# Patient Record
Sex: FEMALE | Race: WHITE | NOT HISPANIC OR LATINO | Employment: FULL TIME | URBAN - METROPOLITAN AREA
[De-identification: names, ages, dates, MRNs, and addresses within clinical notes are randomized per-mention and may not be internally consistent; named-entity substitution may affect disease eponyms.]

---

## 2017-01-11 ENCOUNTER — TRANSCRIBE ORDERS (OUTPATIENT)
Dept: MAMMOGRAPHY | Facility: HOSPITAL | Age: 57
End: 2017-01-11

## 2017-01-11 ENCOUNTER — HOSPITAL ENCOUNTER (OUTPATIENT)
Dept: MAMMOGRAPHY | Facility: HOSPITAL | Age: 57
Discharge: HOME | End: 2017-01-11
Admitting: OBSTETRICS & GYNECOLOGY

## 2017-01-11 DIAGNOSIS — R92.8 ABNORMAL MAMMOGRAM: ICD-10-CM

## 2017-01-11 DIAGNOSIS — R92.8 ABNORMAL MAMMOGRAM: Primary | ICD-10-CM

## 2017-01-11 PROCEDURE — 77065 DX MAMMO INCL CAD UNI: CPT | Performed by: RADIOLOGY

## 2017-01-11 PROCEDURE — G0206 DX MAMMO INCL CAD UNI: HCPCS

## 2017-07-12 ENCOUNTER — APPOINTMENT (OUTPATIENT)
Dept: MAMMOGRAPHY | Facility: HOSPITAL | Age: 57
End: 2017-07-12
Attending: OBSTETRICS & GYNECOLOGY

## 2017-07-12 ENCOUNTER — HOSPITAL ENCOUNTER (OUTPATIENT)
Dept: MAMMOGRAPHY | Facility: HOSPITAL | Age: 57
Discharge: HOME | End: 2017-07-12
Attending: OBSTETRICS & GYNECOLOGY | Admitting: OBSTETRICS & GYNECOLOGY

## 2017-07-12 ENCOUNTER — TRANSCRIBE ORDERS (OUTPATIENT)
Dept: MAMMOGRAPHY | Facility: HOSPITAL | Age: 57
End: 2017-07-12

## 2017-07-12 DIAGNOSIS — R92.8 ABNORMAL MAMMOGRAM: Primary | ICD-10-CM

## 2017-07-12 DIAGNOSIS — R92.8 ABNORMAL MAMMOGRAM: ICD-10-CM

## 2017-07-12 PROCEDURE — 77065 DX MAMMO INCL CAD UNI: CPT | Performed by: RADIOLOGY

## 2017-07-12 PROCEDURE — 77061 BREAST TOMOSYNTHESIS UNI: CPT | Performed by: RADIOLOGY

## 2017-07-12 PROCEDURE — G0279 TOMOSYNTHESIS, MAMMO: HCPCS

## 2017-07-12 PROCEDURE — G0206 DX MAMMO INCL CAD UNI: HCPCS

## 2017-12-19 ENCOUNTER — OFFICE VISIT (OUTPATIENT)
Dept: INTERNAL MEDICINE | Facility: CLINIC | Age: 57
End: 2017-12-19

## 2017-12-19 ENCOUNTER — TELEPHONE (OUTPATIENT)
Dept: INTERNAL MEDICINE | Facility: CLINIC | Age: 57
End: 2017-12-19

## 2017-12-19 VITALS
HEART RATE: 74 BPM | WEIGHT: 143 LBS | HEIGHT: 66 IN | TEMPERATURE: 98.1 F | DIASTOLIC BLOOD PRESSURE: 72 MMHG | OXYGEN SATURATION: 99 % | SYSTOLIC BLOOD PRESSURE: 110 MMHG | BODY MASS INDEX: 22.98 KG/M2

## 2017-12-19 DIAGNOSIS — J01.91 ACUTE RECURRENT SINUSITIS, UNSPECIFIED LOCATION: Primary | ICD-10-CM

## 2017-12-19 PROCEDURE — 99213 OFFICE O/P EST LOW 20 MIN: CPT | Performed by: NURSE PRACTITIONER

## 2017-12-19 RX ORDER — CEFDINIR 300 MG/1
300 CAPSULE ORAL DAILY
Qty: 20 CAPSULE | Refills: 0 | Status: SHIPPED | OUTPATIENT
Start: 2017-12-19 | End: 2018-03-07

## 2017-12-19 NOTE — PROGRESS NOTES
Subjective  URI (pt states that this has been going on for about 5 or 6 days. )      Samanta Mera is a 57 y.o. female.   Allergies   Allergen Reactions   • No Known Drug Allergy      History of Present Illness      2-3 weeks ago went to [facility] was given Amoxil, took that was better for 1 week then 6 days ago sinus pain and pressure , snot from nose, cough with intermittent production, chills , has tried sudafed , mucinex all w/o relief   The following portions of the patient's history were reviewed and updated as appropriate: allergies, past surgical history and problem list.    Review of Systems   Constitutional: Positive for chills and fatigue. Negative for activity change and unexpected weight change.   HENT: Positive for congestion, postnasal drip, sinus pain, sinus pressure and sore throat. Negative for ear pain.    Eyes: Negative for pain and discharge.   Respiratory: Positive for cough. Negative for chest tightness, shortness of breath and wheezing.    Cardiovascular: Negative for chest pain and palpitations.   Gastrointestinal: Negative for abdominal pain, diarrhea and vomiting.   Endocrine: Negative.    Genitourinary: Negative.    Musculoskeletal: Negative for joint swelling.   Skin: Negative for color change, rash and wound.   Allergic/Immunologic: Negative.    Neurological: Negative for seizures and syncope.   Psychiatric/Behavioral: Negative.    All other systems reviewed and are negative.      Objective   Physical Exam   Constitutional: She is oriented to person, place, and time. She appears well-developed and well-nourished.  Non-toxic appearance. No distress.   HENT:   Head: Normocephalic and atraumatic. Hair is normal.   Right Ear: External ear normal. No drainage, swelling or tenderness. Tympanic membrane is retracted.   Left Ear: External ear normal. No drainage, swelling or tenderness. Tympanic membrane is retracted.   Nose: Mucosal edema present. No epistaxis. Right sinus exhibits  "maxillary sinus tenderness and frontal sinus tenderness. Left sinus exhibits maxillary sinus tenderness and frontal sinus tenderness.   Mouth/Throat: Uvula is midline and mucous membranes are normal. No oral lesions. No uvula swelling. Posterior oropharyngeal erythema present. No oropharyngeal exudate.   Eyes: Conjunctivae are normal. Right eye exhibits no discharge. Left eye exhibits no discharge. No scleral icterus.   Neck: Neck supple.   Cardiovascular: Normal rate and regular rhythm.  Exam reveals no gallop.    No murmur heard.  Pulmonary/Chest: Breath sounds normal. No stridor. No respiratory distress. She has no wheezes. She has no rales. She exhibits no tenderness.   Abdominal: Soft. There is no tenderness.   Lymphadenopathy:     She has cervical adenopathy.   Neurological: She is alert and oriented to person, place, and time. She exhibits normal muscle tone.   Skin: Skin is warm and dry. No rash noted. She is not diaphoretic.   Psychiatric: She has a normal mood and affect. Her behavior is normal. Judgment and thought content normal.   Nursing note and vitals reviewed.    /72  Pulse 74  Temp 98.1 °F (36.7 °C) (Oral)   Ht 167.6 cm (65.98\")  Wt 64.9 kg (143 lb)  SpO2 99%  BMI 23.09 kg/m2    Assessment/Plan     Problem List Items Addressed This Visit     None      Visit Diagnoses     Acute recurrent sinusitis, unspecified location    -  Primary    Relevant Medications    cefdinir (OMNICEF) 300 MG capsule      nasal saline otc  Increase fluids. Tylenol/ibuprofen prn comfort, rtc 48h no improvement or worsening of sx.             "

## 2017-12-19 NOTE — TELEPHONE ENCOUNTER
Pharmacy called to see if omnicef directions were correct. Spoke with zahida, it is supposed to be 2 tablets daily. Called pharmacy and corrected directions.

## 2018-01-12 ENCOUNTER — HOSPITAL ENCOUNTER (OUTPATIENT)
Dept: MAMMOGRAPHY | Facility: HOSPITAL | Age: 58
Discharge: HOME | End: 2018-01-12
Attending: OBSTETRICS & GYNECOLOGY | Admitting: OBSTETRICS & GYNECOLOGY

## 2018-01-12 ENCOUNTER — HOSPITAL ENCOUNTER (OUTPATIENT)
Dept: ULTRASOUND IMAGING | Facility: HOSPITAL | Age: 58
Discharge: HOME | End: 2018-01-12

## 2018-01-12 ENCOUNTER — HOSPITAL ENCOUNTER (OUTPATIENT)
Dept: MAMMOGRAPHY | Facility: HOSPITAL | Age: 58
Discharge: HOME | End: 2018-01-12

## 2018-01-12 DIAGNOSIS — R92.8 ABNORMAL MAMMOGRAM: ICD-10-CM

## 2018-01-12 PROCEDURE — 19083 BX BREAST 1ST LESION US IMAG: CPT | Performed by: RADIOLOGY

## 2018-01-12 PROCEDURE — 76642 ULTRASOUND BREAST LIMITED: CPT | Performed by: RADIOLOGY

## 2018-01-12 PROCEDURE — 88305 TISSUE EXAM BY PATHOLOGIST: CPT | Performed by: RADIOLOGY

## 2018-01-12 PROCEDURE — 77066 DX MAMMO INCL CAD BI: CPT

## 2018-01-12 PROCEDURE — 77062 BREAST TOMOSYNTHESIS BI: CPT | Performed by: RADIOLOGY

## 2018-01-12 PROCEDURE — 88342 IMHCHEM/IMCYTCHM 1ST ANTB: CPT | Performed by: RADIOLOGY

## 2018-01-12 PROCEDURE — A4648 IMPLANTABLE TISSUE MARKER: HCPCS

## 2018-01-12 PROCEDURE — 77066 DX MAMMO INCL CAD BI: CPT | Performed by: RADIOLOGY

## 2018-01-12 PROCEDURE — 88360 TUMOR IMMUNOHISTOCHEM/MANUAL: CPT | Performed by: RADIOLOGY

## 2018-01-12 PROCEDURE — G0279 TOMOSYNTHESIS, MAMMO: HCPCS

## 2018-01-12 PROCEDURE — 76642 ULTRASOUND BREAST LIMITED: CPT

## 2018-01-12 RX ORDER — LIDOCAINE HYDROCHLORIDE 10 MG/ML
5 INJECTION, SOLUTION INFILTRATION; PERINEURAL ONCE
Status: COMPLETED | OUTPATIENT
Start: 2018-01-12 | End: 2018-01-12

## 2018-01-12 RX ORDER — LIDOCAINE HYDROCHLORIDE AND EPINEPHRINE 10; 10 MG/ML; UG/ML
10 INJECTION, SOLUTION INFILTRATION; PERINEURAL ONCE
Status: COMPLETED | OUTPATIENT
Start: 2018-01-12 | End: 2018-01-12

## 2018-01-12 RX ADMIN — LIDOCAINE HYDROCHLORIDE 5 ML: 10 INJECTION, SOLUTION INFILTRATION; PERINEURAL at 13:10

## 2018-01-12 RX ADMIN — LIDOCAINE HYDROCHLORIDE,EPINEPHRINE BITARTRATE 10 ML: 10; .01 INJECTION, SOLUTION INFILTRATION; PERINEURAL at 13:12

## 2018-01-17 LAB
CYTO UR: NORMAL
LAB AP CASE REPORT: NORMAL
LAB AP CLINICAL INFORMATION: NORMAL
LAB AP DIAGNOSIS COMMENT: NORMAL
LAB AP SPECIAL STAINS: NORMAL
Lab: NORMAL
PATH REPORT.FINAL DX SPEC: NORMAL
PATH REPORT.GROSS SPEC: NORMAL

## 2018-01-19 ENCOUNTER — TELEPHONE (OUTPATIENT)
Dept: MAMMOGRAPHY | Facility: HOSPITAL | Age: 58
End: 2018-01-19

## 2018-02-07 ENCOUNTER — DOCUMENTATION (OUTPATIENT)
Dept: OTHER | Facility: HOSPITAL | Age: 58
End: 2018-02-07

## 2018-02-07 NOTE — PROGRESS NOTES
I saw patient with Dr BOYER and patients  - Dr BOYER reviewed the pathology report and surgical options - Stage IA, LG IDC, ER/PA positive and HER negative - she prefers to have BCT. An MRI has been ordered - I reviewed the educational and supportive materials with the patient and her .

## 2018-02-20 ENCOUNTER — TELEPHONE (OUTPATIENT)
Dept: MRI IMAGING | Facility: HOSPITAL | Age: 58
End: 2018-02-20

## 2018-02-20 ENCOUNTER — DOCUMENTATION (OUTPATIENT)
Dept: OTHER | Facility: HOSPITAL | Age: 58
End: 2018-02-20

## 2018-02-20 ENCOUNTER — HOSPITAL ENCOUNTER (OUTPATIENT)
Dept: MRI IMAGING | Facility: HOSPITAL | Age: 58
Discharge: HOME | End: 2018-02-20
Attending: SURGERY | Admitting: SURGERY

## 2018-02-20 DIAGNOSIS — C50.411 BILATERAL MALIGNANT NEOPLASM OF UPPER OUTER QUADRANT OF BREAST IN FEMALE, UNSPECIFIED ESTROGEN RECEPTOR STATUS (HCC): ICD-10-CM

## 2018-02-20 DIAGNOSIS — C50.412 BILATERAL MALIGNANT NEOPLASM OF UPPER OUTER QUADRANT OF BREAST IN FEMALE, UNSPECIFIED ESTROGEN RECEPTOR STATUS (HCC): ICD-10-CM

## 2018-02-20 LAB — CREAT BLDA-MCNC: 0.9 MG/DL (ref 0.6–1.3)

## 2018-02-20 PROCEDURE — 77059 MRI BREAST BILATERAL DIAGNOSTIC W WO CONTRAST: CPT | Performed by: RADIOLOGY

## 2018-02-20 PROCEDURE — 82565 ASSAY OF CREATININE: CPT

## 2018-02-20 PROCEDURE — C8908 MRI W/O FOL W/CONT, BREAST,: HCPCS

## 2018-02-20 PROCEDURE — 0159T PR BREAST MRI, COMPUTER AIDED DETECTION: CPT | Performed by: RADIOLOGY

## 2018-02-20 PROCEDURE — A9577 INJ MULTIHANCE: HCPCS | Performed by: SURGERY

## 2018-02-20 PROCEDURE — 0 GADOBENATE DIMEGLUMINE 529 MG/ML SOLUTION: Performed by: SURGERY

## 2018-02-20 RX ADMIN — GADOBENATE DIMEGLUMINE 12 ML: 529 INJECTION, SOLUTION INTRAVENOUS at 10:45

## 2018-02-20 NOTE — TELEPHONE ENCOUNTER
Called pt with Breast MRI results. Recommended surgical consult. Pt expressed verbal understanding and was to touch base with her surgeon.

## 2018-02-20 NOTE — PROGRESS NOTES
Patient called to say that she had received her MRI result and she was ready to schedule lumpectomy - I reviewed wire localization and that it might be Thursday afternoon before  called her - she verbalized understanding - I notified Sridevi in Dr BOYER's office

## 2018-02-22 ENCOUNTER — TRANSCRIBE ORDERS (OUTPATIENT)
Dept: MAMMOGRAPHY | Facility: HOSPITAL | Age: 58
End: 2018-02-22

## 2018-02-22 ENCOUNTER — DOCUMENTATION (OUTPATIENT)
Dept: OTHER | Facility: HOSPITAL | Age: 58
End: 2018-02-22

## 2018-02-22 ENCOUNTER — TRANSCRIBE ORDERS (OUTPATIENT)
Dept: ADMINISTRATIVE | Facility: HOSPITAL | Age: 58
End: 2018-02-22

## 2018-02-22 DIAGNOSIS — C50.411 CARCINOMA OF UPPER-OUTER QUADRANT OF FEMALE BREAST, RIGHT (HCC): Primary | ICD-10-CM

## 2018-02-22 DIAGNOSIS — C50.411 PRIMARY CANCER OF UPPER OUTER QUADRANT OF RIGHT BREAST (HCC): Primary | ICD-10-CM

## 2018-02-22 NOTE — PROGRESS NOTES
Patient called wanting to know if a surgery date was available yet I called Chandrika @ [facility] and she said she was waiting to hear back from [facility] for wire loc time. I notified patient that she should hear from  by tomorrow morning.  Patient verbalized understanding

## 2018-02-23 ENCOUNTER — TRANSCRIBE ORDERS (OUTPATIENT)
Dept: LAB | Facility: HOSPITAL | Age: 58
End: 2018-02-23

## 2018-02-23 ENCOUNTER — LAB (OUTPATIENT)
Dept: LAB | Facility: HOSPITAL | Age: 58
End: 2018-02-23

## 2018-02-23 DIAGNOSIS — Z01.812 PRE-OPERATIVE LABORATORY EXAMINATION: ICD-10-CM

## 2018-02-23 DIAGNOSIS — Z01.812 PRE-OPERATIVE LABORATORY EXAMINATION: Primary | ICD-10-CM

## 2018-02-23 LAB
ALBUMIN SERPL-MCNC: 4.6 G/DL (ref 3.2–4.8)
ALBUMIN/GLOB SERPL: 1.4 G/DL (ref 1.5–2.5)
ALP SERPL-CCNC: 49 U/L (ref 25–100)
ALT SERPL W P-5'-P-CCNC: 26 U/L (ref 7–40)
ANION GAP SERPL CALCULATED.3IONS-SCNC: 9 MMOL/L (ref 3–11)
AST SERPL-CCNC: 35 U/L (ref 0–33)
BILIRUB SERPL-MCNC: 0.4 MG/DL (ref 0.3–1.2)
BUN BLD-MCNC: 23 MG/DL (ref 9–23)
BUN/CREAT SERPL: 25.6 (ref 7–25)
CALCIUM SPEC-SCNC: 9.9 MG/DL (ref 8.7–10.4)
CHLORIDE SERPL-SCNC: 103 MMOL/L (ref 99–109)
CO2 SERPL-SCNC: 26 MMOL/L (ref 20–31)
CREAT BLD-MCNC: 0.9 MG/DL (ref 0.6–1.3)
DEPRECATED RDW RBC AUTO: 44 FL (ref 37–54)
ERYTHROCYTE [DISTWIDTH] IN BLOOD BY AUTOMATED COUNT: 13.3 % (ref 11.3–14.5)
GFR SERPL CREATININE-BSD FRML MDRD: 64 ML/MIN/1.73
GLOBULIN UR ELPH-MCNC: 3.3 GM/DL
GLUCOSE BLD-MCNC: 91 MG/DL (ref 70–100)
HCT VFR BLD AUTO: 41.8 % (ref 34.5–44)
HGB BLD-MCNC: 13.5 G/DL (ref 11.5–15.5)
MCH RBC QN AUTO: 29.2 PG (ref 27–31)
MCHC RBC AUTO-ENTMCNC: 32.3 G/DL (ref 32–36)
MCV RBC AUTO: 90.3 FL (ref 80–99)
PLATELET # BLD AUTO: 237 10*3/MM3 (ref 150–450)
PMV BLD AUTO: 12 FL (ref 6–12)
POTASSIUM BLD-SCNC: 4.3 MMOL/L (ref 3.5–5.5)
PROT SERPL-MCNC: 7.9 G/DL (ref 5.7–8.2)
RBC # BLD AUTO: 4.63 10*6/MM3 (ref 3.89–5.14)
SODIUM BLD-SCNC: 138 MMOL/L (ref 132–146)
WBC NRBC COR # BLD: 7.25 10*3/MM3 (ref 3.5–10.8)

## 2018-02-23 PROCEDURE — 36415 COLL VENOUS BLD VENIPUNCTURE: CPT

## 2018-02-23 PROCEDURE — 85027 COMPLETE CBC AUTOMATED: CPT

## 2018-02-23 PROCEDURE — 80053 COMPREHEN METABOLIC PANEL: CPT

## 2018-02-27 ENCOUNTER — HOSPITAL ENCOUNTER (OUTPATIENT)
Dept: MAMMOGRAPHY | Facility: HOSPITAL | Age: 58
Discharge: HOME | End: 2018-02-27

## 2018-02-27 ENCOUNTER — HOSPITAL ENCOUNTER (OUTPATIENT)
Dept: MAMMOGRAPHY | Facility: HOSPITAL | Age: 58
Discharge: HOME | End: 2018-02-27
Attending: SURGERY | Admitting: SURGERY

## 2018-02-27 ENCOUNTER — HOSPITAL ENCOUNTER (OUTPATIENT)
Dept: NUCLEAR MEDICINE | Facility: HOSPITAL | Age: 58
Discharge: HOME | End: 2018-02-27
Attending: SURGERY

## 2018-02-27 DIAGNOSIS — C50.411 PRIMARY CANCER OF UPPER OUTER QUADRANT OF RIGHT BREAST (HCC): ICD-10-CM

## 2018-02-27 DIAGNOSIS — C50.411 CARCINOMA OF UPPER-OUTER QUADRANT OF FEMALE BREAST, RIGHT (HCC): ICD-10-CM

## 2018-02-27 PROCEDURE — 77065 DX MAMMO INCL CAD UNI: CPT | Performed by: RADIOLOGY

## 2018-02-27 PROCEDURE — 38792 RA TRACER ID OF SENTINL NODE: CPT

## 2018-02-27 PROCEDURE — 76098 X-RAY EXAM SURGICAL SPECIMEN: CPT | Performed by: RADIOLOGY

## 2018-02-27 PROCEDURE — 19283 PERQ DEV BREAST 1ST STRTCTC: CPT | Performed by: RADIOLOGY

## 2018-02-27 PROCEDURE — 0 TECHNETIUM FILTERED SULFUR COLLOID: Performed by: SURGERY

## 2018-02-27 PROCEDURE — 88331 PATH CONSLTJ SURG 1 BLK 1SPC: CPT | Performed by: SURGERY

## 2018-02-27 PROCEDURE — 76098 X-RAY EXAM SURGICAL SPECIMEN: CPT

## 2018-02-27 PROCEDURE — 88307 TISSUE EXAM BY PATHOLOGIST: CPT | Performed by: SURGERY

## 2018-02-27 PROCEDURE — A9541 TC99M SULFUR COLLOID: HCPCS | Performed by: SURGERY

## 2018-02-27 RX ORDER — LIDOCAINE HYDROCHLORIDE 10 MG/ML
5 INJECTION, SOLUTION INFILTRATION; PERINEURAL ONCE
Status: COMPLETED | OUTPATIENT
Start: 2018-02-27 | End: 2018-02-27

## 2018-02-27 RX ADMIN — TECHNETIUM TC 99M SULFUR COLLOID 1 DOSE: KIT at 13:15

## 2018-02-27 RX ADMIN — LIDOCAINE HYDROCHLORIDE 1 ML: 10 INJECTION, SOLUTION INFILTRATION; PERINEURAL at 09:14

## 2018-02-27 RX ADMIN — METHYLENE BLUE 10 MG: 10 INJECTION INTRAVENOUS at 09:16

## 2018-02-28 ENCOUNTER — LAB REQUISITION (OUTPATIENT)
Dept: LAB | Facility: HOSPITAL | Age: 58
End: 2018-02-28

## 2018-02-28 DIAGNOSIS — C50.411 MALIGNANT NEOPLASM OF UPPER-OUTER QUADRANT OF RIGHT FEMALE BREAST (HCC): ICD-10-CM

## 2018-03-01 LAB
CYTO UR: NORMAL
LAB AP CASE REPORT: NORMAL
LAB AP CLINICAL INFORMATION: NORMAL
Lab: NORMAL
Lab: NORMAL
PATH REPORT.FINAL DX SPEC: NORMAL
PATH REPORT.GROSS SPEC: NORMAL

## 2018-03-07 ENCOUNTER — HOSPITAL ENCOUNTER (OUTPATIENT)
Dept: RADIATION ONCOLOGY | Facility: HOSPITAL | Age: 58
Setting detail: RADIATION/ONCOLOGY SERIES
Discharge: HOME | End: 2018-03-07

## 2018-03-07 ENCOUNTER — OFFICE VISIT (OUTPATIENT)
Dept: RADIATION ONCOLOGY | Facility: HOSPITAL | Age: 58
End: 2018-03-07

## 2018-03-07 ENCOUNTER — EDUCATION (OUTPATIENT)
Dept: ONCOLOGY | Facility: HOSPITAL | Age: 58
End: 2018-03-07

## 2018-03-07 ENCOUNTER — CONSULT (OUTPATIENT)
Dept: ONCOLOGY | Facility: CLINIC | Age: 58
End: 2018-03-07

## 2018-03-07 VITALS
RESPIRATION RATE: 14 BRPM | TEMPERATURE: 97.4 F | BODY MASS INDEX: 22.5 KG/M2 | SYSTOLIC BLOOD PRESSURE: 114 MMHG | DIASTOLIC BLOOD PRESSURE: 77 MMHG | HEIGHT: 66 IN | HEART RATE: 70 BPM | WEIGHT: 140 LBS

## 2018-03-07 DIAGNOSIS — C50.411 MALIGNANT NEOPLASM OF UPPER-OUTER QUADRANT OF RIGHT BREAST IN FEMALE, ESTROGEN RECEPTOR POSITIVE (HCC): Primary | ICD-10-CM

## 2018-03-07 DIAGNOSIS — C50.911 MALIGNANT NEOPLASM OF RIGHT BREAST IN FEMALE, ESTROGEN RECEPTOR POSITIVE, UNSPECIFIED SITE OF BREAST (HCC): Primary | ICD-10-CM

## 2018-03-07 DIAGNOSIS — Z17.0 MALIGNANT NEOPLASM OF UPPER-OUTER QUADRANT OF RIGHT BREAST IN FEMALE, ESTROGEN RECEPTOR POSITIVE (HCC): Primary | ICD-10-CM

## 2018-03-07 DIAGNOSIS — Z17.0 MALIGNANT NEOPLASM OF RIGHT BREAST IN FEMALE, ESTROGEN RECEPTOR POSITIVE, UNSPECIFIED SITE OF BREAST (HCC): Primary | ICD-10-CM

## 2018-03-07 PROCEDURE — 99204 OFFICE O/P NEW MOD 45 MIN: CPT | Performed by: INTERNAL MEDICINE

## 2018-03-07 NOTE — PLAN OF CARE
Oral Chemotherapy Teaching      Patient Name/:  Samanta Mera   1960  Oral Chemotherapy Regimen:  Anastrazole daily  Date Started Medication: TBD    Initial Teaching Follow Up Comments     Safety     Storage instructions (away from children; away from heat/cold, sunlight, or moisture), handling - use of gloves (caregivers), washing hands after touching pills, managing waste     “How are you storing your medications?”, reminders on storage, proper handling (caregivers using gloves, washing hands, away from children, managing waste, etc.), disposal of medication with D/C or dosage change         Adherence      patient and/or caregiver on how to take medication, take with/without food, assess their adherence potential, stress importance of adherence, ways to manage adherence (pill boxes, phone reminders, calendars), what to do if miss a dose   “How are you taking your medication?” “How are you remembering to take your medication?”, “How many doses have you missed?”, determine reasons for non-adherence (not remembering, side effects, etc), ways to improve, overadherence? Remind patient of ways to improve/maintain adherence   Discussed taking medication once every day     Side Effects/Adverse Reactions      patient on potential side effects, s/s, ways to manage, when to call MD/seek help     Determine if patient experiencing side effects, ways to manage  Discussed potential AEs such as hot flashes, mood changes, muscle/joint pain, osteoporosis risk, and DVT/PE risk.  Discussed management of AEs and when to contact MD/seek medical attention     Miscellaneous     Food interactions, DDIs, financial issues Determine if patient started any new medications since being placed on oral chemo (analyze for DDI) Answered patient questions     Additional Notes:  Encouraged patient to call with future questions

## 2018-03-07 NOTE — PROGRESS NOTES
Subjective     PROBLEM LIST:  1. [id number] (stage IA) ER+ PA+ her2 negative invasive ductal carcinoma of the right breast  A) presented with an abnormality on screening mammogram.  She underwent lumpectomy on 18.  Pathology showed a 5 mm grade 1 IDC.  0/4 SLN involved.      CHIEF COMPLAINT: newly diagnosed breast cancer      HISTORY OF PRESENT ILLNESS:  The patient is a 58 y.o. year old female, referred for evaluation of recently diagnosed breast cancer.  She presented with an abnormality on screening mammogram.  She underwent biopsy and subsequently had an MRI.  She had a lumpectomy last week.  She had been taking estrogen replacement patches which she stopped at the time of her diagnosis.  She is having a lot of hot flashes which are interfering with sleep.  She also reports that her appetite is not great.  She did try taking Effexor for several days which did seem to help with the hot flashes but she had some nausea and other side effects that she stopped this medication.    REVIEW OF SYSTEMS:  A 14 point review of systems was performed and is negative except as noted above.    Past Medical History:   Diagnosis Date   • Malignant neoplasm of right breast in female, estrogen receptor positive 3/7/2018       GYN History: .  First birth age 23.  Menarche age 12.  She has been on hormone replacement therapy since menopause in her early 50s.    Current Outpatient Prescriptions on File Prior to Visit   Medication Sig Dispense Refill   • [DISCONTINUED] cefdinir (OMNICEF) 300 MG capsule Take 1 capsule by mouth Daily. 20 capsule 0   • [DISCONTINUED] estradiol (MINIVELLE, VIVELLE-DOT) 0.1 MG/24HR patch Place 1 patch on the skin 2 (Two) Times a Week.       No current facility-administered medications on file prior to visit.        Allergies   Allergen Reactions   • No Known Drug Allergy        Past Surgical History:   Procedure Laterality Date   •  SECTION      X 3   • HYSTERECTOMY      age 55   •  "OOPHORECTOMY         Social History     Social History   • Marital status:      Social History Main Topics   • Smoking status: Never Smoker   • Smokeless tobacco: Never Used   • Alcohol use 0.6 - 1.2 oz/week     1 - 2 Standard drinks or equivalent per week      Comment: SOCIALLY   • Drug use: No       Family History   Problem Relation Age of Onset   • Cancer Father    • Breast cancer Neg Hx    • Ovarian cancer Neg Hx        Objective     /77  Pulse 70  Temp 97.4 °F (36.3 °C)  Resp 14  Ht 167.6 cm (66\")  Wt 63.5 kg (140 lb)  BMI 22.6 kg/m2  Performance Status: 0  General: well appearing female in no acute distress  Neuro: alert and oriented  HEENT: sclera anicteric, oropharynx clear  Lymphatics: no cervical, supraclavicular, or axillary adenopathy  Cardiovascular: regular rate and rhythm, no murmurs  Lungs: clear to auscultation bilaterally  Abdomen: soft, nontender, nondistended.  No palpable organomegaly  Extremeties: no lower extremity edema  Skin: no rashes, lesions, bruising, or petechiae  Psych: mood and affect appropriate            Assessment/Plan     Samanta Mera is a 58 y.o. year old female with newly diagnosed stage IA ER positive HER-2 negative breast cancer who presents for discussion of adjuvant treatment.  She has met with Dr. Call and will return next week for radiation treatment planning.  I would not recommend chemotherapy for such a small low risk tumor.  We did discuss adjuvant hormonal therapy with an aromatase inhibitor.  We reviewed the potential side effects of anastrozole including hot flashes, vaginal dryness, joint pain, decrease in bone density, and mood or sleep disturbance.  She is are experiencing significant hot flashes related to stopping hormone replacement therapy.  We discussed that these symptoms often improve with time.  I encouraged her to try taking Effexor again and try to stay on it for a month.  Frequently some of the other side effects will decrease with " more time on the medication.  She has had a bone density scan within the past few years that showed normal bone density.  I will tentatively plan to repeat one around March 2019.  I did encourage her to resume taking vitamin D and calcium supplementation.  We also discussed the benefits of regular exercise in helping to manage some of the side effects of treatment.    We will send a prescription for anastrozole today.  She can begin taking this after her radiation is complete.  I will see her back in early May to see how she is tolerating it.             Amy Santillan MD    3/7/2018

## 2018-03-08 ENCOUNTER — DOCUMENTATION (OUTPATIENT)
Dept: OTHER | Facility: HOSPITAL | Age: 58
End: 2018-03-08

## 2018-03-08 NOTE — PROGRESS NOTES
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  Biopsy was positive for infiltrating and in situ low grade adenocarcinoma.  The tumor was ER/AZ positive HER-2/felix negative.  She underwent right breast lumpectomy on 2018 and was found to have infiltrating and in situ well differentiated ductal adenocarcinoma, tumor size 5 x 4 x 4 mm.  Barraza Batista score 3/9.  There was no lymphovascular or perineural invasion.  04 lymph nodes were positive.  Tumor margins were negative by 2 mm.  I've been asked to see her regarding postoperative radiotherapy.  She has recovered well from surgery.  Her main complaint is hot flashes.  She recently stopped taking estrogen.    Allergies   Allergen Reactions   • No Known Drug Allergy        Social History   Substance Use Topics   • Smoking status: Never Smoker   • Smokeless tobacco: Never Used   • Alcohol use 0.6 - 1.2 oz/week     1 - 2 Standard drinks or equivalent per week      Comment: SOCIALLY       Past Medical History:   Diagnosis Date   • Malignant neoplasm of right breast in female, estrogen receptor positive 3/7/2018       family history includes Cancer in her father. There is no history of Breast cancer or Ovarian cancer.     Past Surgical History:   Procedure Laterality Date   •  " SECTION      X 3   • HYSTERECTOMY      age 55   • OOPHORECTOMY          Review of Systems - Oncology        Objective    VITAL SIGNS: Height 66\"  Weight 138 pounds  161/70 pulse 70    KPS       90%    Physical Exam   Constitutional: She appears well-developed and well-nourished.   Neck: Neck supple.   Cardiovascular: Normal rate, regular rhythm and normal heart sounds.    Pulmonary/Chest: Effort normal and breath sounds normal. Right breast exhibits skin change. Right breast exhibits no inverted nipple, no mass, no nipple discharge and no tenderness. Left breast exhibits no inverted nipple, no mass, no nipple discharge, no skin change and no tenderness.       Abdominal: Soft.   Lymphadenopathy:     She has no axillary adenopathy.   Nursing note and vitals reviewed.           The following portions of the patient's history were reviewed and updated as appropriate: allergies, current medications, past family history, past medical history, past social history, past surgical history and problem list.    Assessment      IMPRESSION: Mrs. Mera has early stage breast cancer.      RECOMMENDATIONS:  I recommend postoperative radiotherapy to complete her breast conserving treatment.  The pros and cons, risks and benefits of therapy were discussed with Mrs. Mera and her  and informed consent obtained.  She will see Dr. Santillan today regarding chemotherapy.  If she does not undergo chemotherapy we will proceed with radiation of 40.05 Mera in 15 fractions and no tumor bed boost.  Thank you very much for asking me to see Mrs. Mera.         Saumya Call MD      Errors in dictation may reflect use of voice recognition software and not all errors in transcription may have been detected prior to signing.  "

## 2018-03-08 NOTE — PROGRESS NOTES
I saw patient in follow up s/p lumpectomy and SLN biopsy with Dr BOYER - the patient states that she is not sleeping and having really severe hot flashes after having to stop her HRT due to having ER/SC positive breast cancer. She states that the Efexor helps but she does not like the other side effects. I suggested that she discuss this with Dr. Santillan Medical Oncologist and see Nicolette DEJESUS. She agreed - I have made referral to Nicolette and her office will call patient to schedule.

## 2018-03-15 ENCOUNTER — HOSPITAL ENCOUNTER (OUTPATIENT)
Dept: RADIATION ONCOLOGY | Facility: HOSPITAL | Age: 58
Discharge: HOME | End: 2018-03-15

## 2018-03-15 PROCEDURE — 77290 THER RAD SIMULAJ FIELD CPLX: CPT | Performed by: RADIOLOGY

## 2018-03-15 PROCEDURE — 77332 RADIATION TREATMENT AID(S): CPT | Performed by: RADIOLOGY

## 2018-03-15 PROCEDURE — 77280 THER RAD SIMULAJ FIELD SMPL: CPT | Performed by: RADIOLOGY

## 2018-03-19 PROCEDURE — 77334 RADIATION TREATMENT AID(S): CPT | Performed by: RADIOLOGY

## 2018-03-19 PROCEDURE — 77300 RADIATION THERAPY DOSE PLAN: CPT | Performed by: RADIOLOGY

## 2018-03-19 PROCEDURE — 77295 3-D RADIOTHERAPY PLAN: CPT | Performed by: RADIOLOGY

## 2018-03-23 ENCOUNTER — HOSPITAL ENCOUNTER (OUTPATIENT)
Dept: RADIATION ONCOLOGY | Facility: HOSPITAL | Age: 58
Discharge: HOME | End: 2018-03-23

## 2018-03-23 PROCEDURE — 77280 THER RAD SIMULAJ FIELD SMPL: CPT | Performed by: RADIOLOGY

## 2018-03-28 ENCOUNTER — HOSPITAL ENCOUNTER (OUTPATIENT)
Dept: RADIATION ONCOLOGY | Facility: HOSPITAL | Age: 58
Discharge: HOME | End: 2018-03-28

## 2018-03-28 VITALS — WEIGHT: 137.7 LBS | BODY MASS INDEX: 22.23 KG/M2

## 2018-03-28 PROCEDURE — 77412 RADIATION TX DELIVERY LVL 3: CPT | Performed by: RADIOLOGY

## 2018-03-28 PROCEDURE — 77336 RADIATION PHYSICS CONSULT: CPT | Performed by: RADIOLOGY

## 2018-03-29 ENCOUNTER — HOSPITAL ENCOUNTER (OUTPATIENT)
Dept: RADIATION ONCOLOGY | Facility: HOSPITAL | Age: 58
Discharge: HOME | End: 2018-03-29

## 2018-03-29 PROCEDURE — 77412 RADIATION TX DELIVERY LVL 3: CPT | Performed by: RADIOLOGY

## 2018-03-30 ENCOUNTER — HOSPITAL ENCOUNTER (OUTPATIENT)
Dept: RADIATION ONCOLOGY | Facility: HOSPITAL | Age: 58
Discharge: HOME | End: 2018-03-30

## 2018-03-30 PROCEDURE — 77412 RADIATION TX DELIVERY LVL 3: CPT | Performed by: RADIOLOGY

## 2018-04-02 ENCOUNTER — HOSPITAL ENCOUNTER (OUTPATIENT)
Dept: RADIATION ONCOLOGY | Facility: HOSPITAL | Age: 58
Discharge: HOME | End: 2018-04-02

## 2018-04-02 ENCOUNTER — HOSPITAL ENCOUNTER (OUTPATIENT)
Dept: RADIATION ONCOLOGY | Facility: HOSPITAL | Age: 58
Setting detail: RADIATION/ONCOLOGY SERIES
Discharge: HOME | End: 2018-04-02

## 2018-04-02 PROCEDURE — 77412 RADIATION TX DELIVERY LVL 3: CPT | Performed by: RADIOLOGY

## 2018-04-03 ENCOUNTER — HOSPITAL ENCOUNTER (OUTPATIENT)
Dept: RADIATION ONCOLOGY | Facility: HOSPITAL | Age: 58
Discharge: HOME | End: 2018-04-03

## 2018-04-03 VITALS — WEIGHT: 139 LBS | BODY MASS INDEX: 22.44 KG/M2

## 2018-04-03 PROCEDURE — 77412 RADIATION TX DELIVERY LVL 3: CPT | Performed by: RADIOLOGY

## 2018-04-03 PROCEDURE — 77417 THER RADIOLOGY PORT IMAGE(S): CPT | Performed by: RADIOLOGY

## 2018-04-04 ENCOUNTER — HOSPITAL ENCOUNTER (OUTPATIENT)
Dept: RADIATION ONCOLOGY | Facility: HOSPITAL | Age: 58
Discharge: HOME | End: 2018-04-04

## 2018-04-04 PROCEDURE — 77412 RADIATION TX DELIVERY LVL 3: CPT | Performed by: RADIOLOGY

## 2018-04-04 PROCEDURE — 77336 RADIATION PHYSICS CONSULT: CPT | Performed by: RADIOLOGY

## 2018-04-05 ENCOUNTER — HOSPITAL ENCOUNTER (OUTPATIENT)
Dept: RADIATION ONCOLOGY | Facility: HOSPITAL | Age: 58
Discharge: HOME | End: 2018-04-05

## 2018-04-05 PROCEDURE — 77412 RADIATION TX DELIVERY LVL 3: CPT | Performed by: RADIOLOGY

## 2018-04-06 ENCOUNTER — HOSPITAL ENCOUNTER (OUTPATIENT)
Dept: RADIATION ONCOLOGY | Facility: HOSPITAL | Age: 58
Discharge: HOME | End: 2018-04-06

## 2018-04-06 PROCEDURE — 77412 RADIATION TX DELIVERY LVL 3: CPT | Performed by: RADIOLOGY

## 2018-04-10 ENCOUNTER — HOSPITAL ENCOUNTER (OUTPATIENT)
Dept: RADIATION ONCOLOGY | Facility: HOSPITAL | Age: 58
Discharge: HOME | End: 2018-04-10

## 2018-04-10 VITALS — BODY MASS INDEX: 22.27 KG/M2 | WEIGHT: 138 LBS

## 2018-04-10 PROCEDURE — 77412 RADIATION TX DELIVERY LVL 3: CPT | Performed by: RADIOLOGY

## 2018-04-11 ENCOUNTER — HOSPITAL ENCOUNTER (OUTPATIENT)
Dept: RADIATION ONCOLOGY | Facility: HOSPITAL | Age: 58
Discharge: HOME | End: 2018-04-11

## 2018-04-11 PROCEDURE — 77412 RADIATION TX DELIVERY LVL 3: CPT | Performed by: RADIOLOGY

## 2018-04-11 PROCEDURE — 77417 THER RADIOLOGY PORT IMAGE(S): CPT | Performed by: RADIOLOGY

## 2018-04-12 ENCOUNTER — HOSPITAL ENCOUNTER (OUTPATIENT)
Dept: RADIATION ONCOLOGY | Facility: HOSPITAL | Age: 58
Discharge: HOME | End: 2018-04-12

## 2018-04-12 PROCEDURE — 77412 RADIATION TX DELIVERY LVL 3: CPT | Performed by: RADIOLOGY

## 2018-04-12 PROCEDURE — 77336 RADIATION PHYSICS CONSULT: CPT | Performed by: RADIOLOGY

## 2018-04-13 ENCOUNTER — HOSPITAL ENCOUNTER (OUTPATIENT)
Dept: RADIATION ONCOLOGY | Facility: HOSPITAL | Age: 58
Discharge: HOME | End: 2018-04-13

## 2018-04-13 PROCEDURE — 77412 RADIATION TX DELIVERY LVL 3: CPT | Performed by: RADIOLOGY

## 2018-04-16 ENCOUNTER — HOSPITAL ENCOUNTER (OUTPATIENT)
Dept: RADIATION ONCOLOGY | Facility: HOSPITAL | Age: 58
Discharge: HOME | End: 2018-04-16

## 2018-04-16 PROCEDURE — 77412 RADIATION TX DELIVERY LVL 3: CPT | Performed by: RADIOLOGY

## 2018-04-17 ENCOUNTER — HOSPITAL ENCOUNTER (OUTPATIENT)
Dept: RADIATION ONCOLOGY | Facility: HOSPITAL | Age: 58
Discharge: HOME | End: 2018-04-17

## 2018-04-17 VITALS — WEIGHT: 139.3 LBS | BODY MASS INDEX: 22.48 KG/M2

## 2018-04-17 PROCEDURE — 77412 RADIATION TX DELIVERY LVL 3: CPT | Performed by: RADIOLOGY

## 2018-04-18 ENCOUNTER — HOSPITAL ENCOUNTER (OUTPATIENT)
Dept: RADIATION ONCOLOGY | Facility: HOSPITAL | Age: 58
Discharge: HOME | End: 2018-04-18

## 2018-04-18 PROCEDURE — 77412 RADIATION TX DELIVERY LVL 3: CPT | Performed by: RADIOLOGY

## 2018-04-18 RX ORDER — ANASTROZOLE 1 MG/1
1 TABLET ORAL DAILY
Qty: 30 TABLET | Refills: 5 | Status: SHIPPED | OUTPATIENT
Start: 2018-04-18 | End: 2018-11-12 | Stop reason: SDUPTHER

## 2018-04-23 NOTE — THERAPY DISCHARGE NOTE
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  Biopsy was positive for infiltrating and in situ low grade adenocarcinoma.  The tumor was ER/HI positive HER-2/felix negative.  She underwent right breast lumpectomy on 2018 and was found to have infiltrating and in situ well differentiated ductal adenocarcinoma, tumor size 5 x 4 x 4 mm.  Barraza Batista score 3/9.  There was no lymphovascular or perineural invasion.  04 lymph nodes were positive.  Tumor margins were negative by 2 mm.   She recover well from surgery.  She stopped taking estrogen and was bothered by hot flashes.    She received radiotherapy in our department as follows:    TREATMENT COURSE:   -2018 The right breast received 40.05 Gy in 15 fractions with 6 and 10 MV photons    TOLERANCE:   Mrs. Mera tolerated treatments well.  She had a flareup actinic keratosis and had miliaria rubra in some exposed skin.  We recommended hydrocortisone cream.  She continued to work throughout treatment.      DISPOSITION:  At the completion of therapy an appointment was made for her to return on 2018 at 2:15 PM.  She knows to call if she has any problems sooner.        Saumya Call MD    Errors in dictation may reflect use of voice recognition software and not all errors in transcription may have been detected prior to signing.

## 2018-05-04 NOTE — PROGRESS NOTES
"      PROBLEM LIST:  1. [id number] (stage IA) ER+ AZ+ her2 negative invasive ductal carcinoma of the right breast  A) presented with an abnormality on screening mammogram.  She underwent lumpectomy on 2/27/18.  Pathology showed a 5 mm grade 1 IDC.  0/4 SLN involved.  B) radiation completed 4/18/18.  Anastrozole started April 2018.       Subjective     HISTORY OF PRESENT ILLNESS:   Samanta Mera returns for follow-up.   She has been taking anastrozole for about 2 or 3 weeks.  She also has been taking Effexor along with it.  She had been on Effexor previously but then stopped it and restarted it around that time she started anastrozole.  She says she's feeling okay.  She has had a little bit of nausea especially in the mornings.  She also notices that she feels generally more fatigued.  She has started back to work in the past month as well.    Past Medical History, Past Surgical History, Social History, Family History have been reviewed and are without significant changes except as mentioned.    Review of Systems   A comprehensive 14 point review of systems was performed and was negative except as mentioned.    Medications:  The current medication list was reviewed in the EMR    ALLERGIES:    Allergies   Allergen Reactions   • No Known Drug Allergy        Objective      /65   Pulse 79   Temp 98 °F (36.7 °C)   Resp 16   Ht 167.6 cm (66\")   Wt 63 kg (139 lb)   BMI 22.44 kg/m²      Performance Status: 0    General: well appearing female in no acute distress  Neuro: alert and oriented  HEENT: sclera anicteric, oropharynx clear  Lymphatics: no cervical, supraclavicular, or axillary adenopathy  Cardiovascular: regular rate and rhythm, no murmurs  Lungs: clear to auscultation bilaterally  Abdomen: soft, nontender, nondistended.  No palpable organomegaly  Extremeties: no lower extremity edema  Skin: no rashes, lesions, bruising, or petechiae  Psych: mood and affect appropriate            Assessment/Plan   Samanta Mera " is a 58 y.o. year old female with stage IA er+ her2 negative breast cancer.  She completed radiation therapy and has started taking anastrozole.  She is having some occasional nausea in the mornings.  I think this may be related to recently resuming Effexor.  I think this should improve with time.  If it is not I suggested she try switching her medications to the evenings before bed.  At this point we will space out visits to every 6 months.  I asked her to call if she has any ongoing issues with her medication.    She will need a repeat bone density scan in March 2019.                  Visit time was 15 minutes, greater than 50% spent in counseling      Amy Santillan MD  [facility]    5/8/2018          CC:

## 2018-05-08 ENCOUNTER — OFFICE VISIT (OUTPATIENT)
Dept: ONCOLOGY | Facility: CLINIC | Age: 58
End: 2018-05-08

## 2018-05-08 VITALS
HEART RATE: 79 BPM | DIASTOLIC BLOOD PRESSURE: 65 MMHG | BODY MASS INDEX: 22.34 KG/M2 | HEIGHT: 66 IN | RESPIRATION RATE: 16 BRPM | WEIGHT: 139 LBS | SYSTOLIC BLOOD PRESSURE: 105 MMHG | TEMPERATURE: 98 F

## 2018-05-08 DIAGNOSIS — C50.911 MALIGNANT NEOPLASM OF RIGHT BREAST IN FEMALE, ESTROGEN RECEPTOR POSITIVE, UNSPECIFIED SITE OF BREAST (HCC): Primary | ICD-10-CM

## 2018-05-08 DIAGNOSIS — Z17.0 MALIGNANT NEOPLASM OF RIGHT BREAST IN FEMALE, ESTROGEN RECEPTOR POSITIVE, UNSPECIFIED SITE OF BREAST (HCC): Primary | ICD-10-CM

## 2018-05-08 PROCEDURE — 99213 OFFICE O/P EST LOW 20 MIN: CPT | Performed by: INTERNAL MEDICINE

## 2018-05-08 RX ORDER — VENLAFAXINE HYDROCHLORIDE 75 MG/1
75 CAPSULE, EXTENDED RELEASE ORAL DAILY
COMMUNITY
End: 2018-11-01 | Stop reason: SDUPTHER

## 2018-05-16 ENCOUNTER — OFFICE VISIT (OUTPATIENT)
Dept: RADIATION ONCOLOGY | Facility: HOSPITAL | Age: 58
End: 2018-05-16

## 2018-05-16 ENCOUNTER — HOSPITAL ENCOUNTER (OUTPATIENT)
Dept: RADIATION ONCOLOGY | Facility: HOSPITAL | Age: 58
Setting detail: RADIATION/ONCOLOGY SERIES
Discharge: HOME | End: 2018-05-16

## 2018-05-16 VITALS
BODY MASS INDEX: 22.66 KG/M2 | SYSTOLIC BLOOD PRESSURE: 121 MMHG | RESPIRATION RATE: 16 BRPM | DIASTOLIC BLOOD PRESSURE: 71 MMHG | TEMPERATURE: 97.8 F | HEIGHT: 66 IN | HEART RATE: 68 BPM | WEIGHT: 141 LBS

## 2018-05-16 DIAGNOSIS — Z85.3 HISTORY OF BREAST CANCER: Primary | ICD-10-CM

## 2018-05-16 PROCEDURE — G0463 HOSPITAL OUTPT CLINIC VISIT: HCPCS

## 2018-05-16 NOTE — PROGRESS NOTES
"FOLLOW UP NOTE    PATIENT:                                                      Samanta Mera  MEDICAL RECORD #:                        [id number]  :                                                          1960  COMPLETION DATE:   2018  DIAGNOSIS:     Cancer Staging  Malignant neoplasm of right breast in female, estrogen receptor positive  Staging form: Breast, AJCC 8th Edition  - Pathologic: Stage IA (pT1a, pN0, cM0, G1, ER: Positive, MI: Positive, HER2: Negative)     BRIEF HISTORY:    Samanta Mera  is a very pleasant 58 y.o.who completed radiotherapy in our department of:  the right breast received 40.05 Gy in 15 fractions with 6 and 10 MV photons.  She tolerated treatments well but with a flareup actinic keratosis and had miliaria rubra in some exposed skin.  We recommended hydrocortisone cream.  She continued to work throughout treatment.  Today she returns with some mild right rib pain just below the right breast.  She has used an anti-inflammatory with some relief.  She is taking anastrozole.       MEDICATIONS: Medication reconciliation for the patient was reviewed and confirmed in the electronic medical record.    Review of Systems   All other systems reviewed and are negative.      KPS 90%    Physical Exam   Constitutional: She appears well-developed and well-nourished.   Neck: Neck supple.   Cardiovascular: Normal rate, regular rhythm and normal heart sounds.    Pulmonary/Chest: Effort normal and breath sounds normal.   Nursing note and vitals reviewed.   examination the breasts reveals the breasts are symmetric.  She has an excellent cosmetic result.  The acute side effects have resolved.  She has no erythema of the breast or in the inframammary fold.  She has tenderness in the right lateral ribs.    VITAL SIGNS:   Vitals:    18 1440   BP: 121/71   Pulse: 68   Resp: 16   Temp: 97.8 °F (36.6 °C)   Weight: 64 kg (141 lb)   Height: 167.6 cm (66\")   PainSc: 0-No pain       The following " portions of the patient's history were reviewed and updated as appropriate: allergies, current medications, past family history, past medical history, past social history, past surgical history and problem list.         There are no diagnoses linked to this encounter.     IMPRESSION:  Mrs. Mera is recovering from acute side effects of radiotherapy     RECOMMENDATIONS:  I recommend an anti-inflammatory agent.  She can increase the ibuprofen.  She could have some pleurisy/inflammation of the ribs.  She will see Dr. Santillan in 6 months so I will see her in our department in 1 year.  It is been a pleasure to participate in her care.  She knows to call if she has any concerns prior to her visit.    No Follow-up on file.    Saumya Call MD    Errors in dictation may reflect use of voice recognition software and not all errors in transcription may have been detected prior to signing.

## 2018-06-11 ENCOUNTER — DOCUMENTATION (OUTPATIENT)
Dept: OTHER | Facility: HOSPITAL | Age: 58
End: 2018-06-11

## 2018-06-11 NOTE — PROGRESS NOTES
Patient called to ask when she would be scheduled for another mammogram - I explained that she would see Dr BOYER for follow up 3 months after surgery and then at 6 month follow up she would see him with mammogram. She verbalized understanding.

## 2018-08-24 ENCOUNTER — DOCUMENTATION (OUTPATIENT)
Dept: OTHER | Facility: HOSPITAL | Age: 58
End: 2018-08-24

## 2018-08-24 NOTE — PROGRESS NOTES
Patient called on 8/22/18 to say that she was felling firmness in her right breast and right axilla where she had BCT on 2/2018. She she states that she saw Dr BOYER 6/20/18 but did not have this issue at that time- I spoke to Dr BOYER and he said he would see her 9/6/18. Patient verbalized understanding

## 2018-09-24 DIAGNOSIS — C50.911 MALIGNANT NEOPLASM OF RIGHT BREAST IN FEMALE, ESTROGEN RECEPTOR POSITIVE, UNSPECIFIED SITE OF BREAST (HCC): Primary | ICD-10-CM

## 2018-09-24 DIAGNOSIS — Z17.0 MALIGNANT NEOPLASM OF RIGHT BREAST IN FEMALE, ESTROGEN RECEPTOR POSITIVE, UNSPECIFIED SITE OF BREAST (HCC): Primary | ICD-10-CM

## 2018-10-05 ENCOUNTER — HOSPITAL ENCOUNTER (OUTPATIENT)
Dept: PHYSICAL THERAPY | Facility: HOSPITAL | Age: 58
Setting detail: THERAPIES SERIES
Discharge: HOME | End: 2018-10-05

## 2018-10-05 DIAGNOSIS — I89.0 LYMPHEDEMA OF BREAST: Primary | ICD-10-CM

## 2018-10-05 PROCEDURE — 97161 PT EVAL LOW COMPLEX 20 MIN: CPT

## 2018-10-05 NOTE — THERAPY EVALUATION
Physical Therapy Lymphedema Initial Evaluation  [facility]     Patient Name: Samanta Mera  : 1960  MRN: [id number]  Today's Date: 10/5/2018      Visit Date: 10/05/2018    Visit Dx:    ICD-10-CM ICD-9-CM   1. Lymphedema of breast I89.0 457.1       Patient Active Problem List   Diagnosis   • Malignant neoplasm of right breast in female, estrogen receptor positive (CMS/HCC)   • History of breast cancer        Past Medical History:   Diagnosis Date   • Malignant neoplasm of right breast in female, estrogen receptor positive (CMS/HCC) 3/7/2018        Past Surgical History:   Procedure Laterality Date   •  SECTION      X 3   • HYSTERECTOMY      age 55   • OOPHORECTOMY         Visit Dx:    ICD-10-CM ICD-9-CM   1. Lymphedema of breast I89.0 457.1             Patient History     Row Name 10/05/18 0900             History    Chief Complaint Pain;Swelling  -LF      Type of Pain Other pain   right breast  -LF      Date Current Problem(s) Began --   2018  -LF      Brief Description of Current Complaint Patient presents with complaint of right breast pain and swelling.  She is s/p lumpectomy 18 and radiation completed April for right breast cancer.  She noticed onset symptoms after that.  had fluid drawn in  and she said this helped for a little while, but then progressively worsened July and August.    -LF      Patient/Caregiver Goals Relieve pain;Decrease swelling  -LF      Hand Dominance right-handed  -LF      Occupation/sports/leisure activities NICU nurse at [employer]  -LF         Pain     Pain Location Breast  -LF      Pain at Present 4  -LF      Pain at Best 4  -LF      Pain at Worst 7  -LF      Pain Frequency Constant/continuous  -LF      What Performance Factors Make the Current Problem(s) WORSE? does not vary based on activity or time of day  -LF         Daily Activities    Primary Language English  -LF      Are you able to read Yes  -LF      Are you able to write Yes  -LF      How  does patient learn best? Listening;Demonstration  -LF      Teaching needs identified Home Exercise Program;Management of Condition  -LF      Barriers to learning None  -LF      Pt Participated in POC and Goals Yes  -LF        User Key  (r) = Recorded By, (t) = Taken By, (c) = Cosigned By    Initials Name Provider Type    LF Hazel Tena, PT Physical Therapist                Lymphedema     Row Name 10/05/18 0800             Lymphedema Assessment    Lymphedema Classification Trunk:;secondary;at risk/stage 0;stage 1 (Spontaneously Reversible)  -LF      Lymphedema Cancer Related Sx right;lumpectomy;sentinel node biopsy  -LF      Lymph Nodes Removed # 4  -LF      Positive Lymph Nodes # 0  -LF      Radiation Therapy Received yes  -LF      Radiation Treatments #/Timeframe 15  -LF      Adverse Radiation Reactions/Complication reports having some skin breakdown/changes near completion of her treatment  -LF         Lymphedema Edema Assessment    Edema Assessment Comment observable edema right breast compared to left.  To palpation, more localized to lateral-inferior breast.  Scars are well healed  -LF         Skin Changes/Observations    Location/Assessment Upper Quadrant  -LF      Upper Quadrant Conditions right:;intact;clean  -LF      Upper Quadrant Color/Pigment right:;red;blanchable   right breast, reddish pink  -LF         Lymphedema Sensation    Lymphedema Sensation Reports RUE:;LUE:;normal  -LF         Lymphedema Measurements    Measurement Type(s) Quick Girth  -LF      Quick Girth Areas Upper extremities  -LF         LUE Quick Girth (cm)    Axilla 29.5 cm  -LF      Mid upper arm 27.5 cm  -LF      Elbow 23.8 cm  -LF      Mid forearm 20.3 cm  -LF      Wrist crease 15 cm  -LF      LUE Quick Girth Total 116.1  -LF         RUE Quick Girth (cm)    Axilla 32.7 cm  -LF      Mid upper arm 26 cm  -LF      Elbow 24 cm  -LF      Mid forearm 20.9 cm  -LF      Wrist crease 15 cm  -LF      RUE Quick Girth Total 118.6  -LF          Manual Lymphatic Drainage    Manual Lymphatic Drainage initial sequence;opened regional lymph nodes;opened anastamoses  -LF      Initial Sequence supraclavicular;shoulder collectors;diaphragmatic breathing  -LF      Supraclavicular right;left  -LF      Shoulder Collectors right;left  -LF      Diaphragmatic Breathing completed x10  -LF      Opened Regional Lymph Nodes axillary;inguinal  -LF      Axillary right  -LF      Inguinal right  -LF      Opened Anastamoses axillo-inguinal  -LF      Axillo-Inguinal right  -LF      Manual Lymphatic Drainage Comments MLD across right breast several sequences, then working toward A-I anastomoses  -LF        User Key  (r) = Recorded By, (t) = Taken By, (c) = Cosigned By    Initials Name Provider Type    LF Hazel Tena PT Physical Therapist                  PT Ortho     Row Name 10/05/18 0900       General ROM    GENERAL ROM COMMENTS UE ROM is WNL's and no complaint of tightness or pulling  -LF       MMT (Manual Muscle Testing)    General MMT Comments UE strength is WNL's 5/5  -LF        Strength Right    # Reps 3  -LF    Right Rung 2  -LF    Right  Test 1 47  -LF    Right  Test 2 45  -LF    Right  Test 3 47  -LF     Strength Average Right 46.33  -LF        Strength Left    # Reps 3  -LF    Left Rung 2  -LF    Left  Test 1 47  -LF    Left  Test 2 52  -LF    Left  Test 3 45  -LF     Strength Average Left 48  -LF       Hand  Strength     Strength Affected Side Bilateral  -LF      User Key  (r) = Recorded By, (t) = Taken By, (c) = Cosigned By    Initials Name Provider Type     Hazel Tena PT Physical Therapist          Therapy Education  Education Details: Provided HEP for self-MLD.  Also discussed compression options for shapewear type garment or Wear Ease  Given: Symptoms/condition management, HEP, Edema management  Program: New  How Provided: Verbal, Demonstration, Written  Provided to: Patient  Level of  Understanding: Verbalized                PT OP Goals     Row Name 10/05/18 0800          PT Short Term Goals    STG Date to Achieve 11/02/18  -LF     STG 1 Patient to report 50% improvement right breast pain  -LF     STG 1 Progress New  -LF     STG 2 Soft tissue swelling, inflammation, or restriction will be reduced by 25 %   -LF     STG 2 Progress New  -LF        Long Term Goals    LTG Date to Achieve 11/16/18  -LF     LTG 1 Patient independent with self-management of  lymphedema to include skin care, self-MLD, compression, and exercise  -LF     LTG 1 Progress New  -LF     LTG 2 Right breast pain and swelling resolved >/= 75%  -LF     LTG 2 Progress New  -LF        Time Calculation    PT Goal Re-Cert Due Date 01/03/19  -LF       User Key  (r) = Recorded By, (t) = Taken By, (c) = Cosigned By    Initials Name Provider Type    LF Hazel Tena, PT Physical Therapist                PT Assessment/Plan     Row Name 10/05/18 0800          PT Assessment    Functional Limitations Other (comment)   edema management  -LF     Impairments Dexterity;Impaired lymphatic circulation;Pain  -LF     Assessment Comments Patient presents with right breast pain and edema/lymphedema following treatment for breast cancer.  further treatment indicated to help reduce symptoms and assist with self-management of symptoms.  -LF     Please refer to paper survey for additional self-reported information Yes  -LF     Rehab Potential Good  -LF     Patient/caregiver participated in establishment of treatment plan and goals Yes  -LF     Patient would benefit from skilled therapy intervention Yes  -LF        PT Plan    PT Frequency 2x/week  -LF     Planned CPT's? PT EVAL LOW COMPLEXITY: 91151;PT THER PROC EA 15 MIN: 74851;PT MANUAL THERAPY EA 15 MIN: 16719  -LF     PT Plan Comments Plan to see 1x/week for CDT/MLD, and increase 2x/week if needed  -LF       User Key  (r) = Recorded By, (t) = Taken By, (c) = Cosigned By    Initials Name Provider Type     Hazel Lopez, PT Physical Therapist             Outcome Measure Options: Disabilities of the Arm, Shoulder, and Hand (DASH)  DASH  Open a tight or new jar.: Mild Difficulty  Write: No Difficulty  Turn a key: No Difficulty  Prepare a meal: No Difficulty  Push open a heavy door: No Difficulty  Place an object on a shelf above your head: No Difficulty  Do heavy household chores (e.g., wash walls, wash floors): No Difficulty  Garden or do yard work: No Difficulty  Make a bed: No Difficulty  Carry a shopping bag or briefcase: No Difficulty  Carry a heavy object (over 10 lbs): No Difficulty  Change a lightbulb overhead: No Difficulty  Wash or blow dry your hair: No Difficulty  Wash your back: No Difficulty  Put on a pullover sweater: No Difficulty  Use a knife to cut food: No Difficulty  Recreational activities in which require little effort (e.g., cardplaying, knitting, etc.): No Difficulty  Recreational activities in which you take some force or impact through your arm, should or hand (e.g. golf, hammering, tennis, etc.): No Difficulty  Recreational Activities in which you move your arm freely (e.g., frisbee, badminton, etc.): No Difficulty  Manage transportation needs (getting from one place to another): No Difficulty  Sexual Activities: No Difficulty  During the past week, to what extent has your arm, shoulder, or hand problem interfered with your normal social activites with family, friends, neighbors or groups?: Not at all  During the past week, were you limited in your work or other regular daily activities as a result of your arm, shoulder or hand problem?: Not limited at all  Arm, Shoulder, or hand pain: Mild  Arm, shoulder or hand pain when you performed any specific activity: None  Tingling (pins and needles) in your arm, shoulder, or hand: None  Weakness in your arm, shoulder or hand: None  Stiffness in your arm, shoulder or hand: None  During the past week, how much difficulty have you had sleeping  because of the pain in your arm, shoulder or hand?: No difficulty  I feel less capable, less confident or less useful because of my arm, shoulder or hand problem: Neither Agree nor Disagree  DASH Sum : 34  Number of Questions Answered: 30  DASH Score: 3.33         Time Calculation:   Start Time: 0800   Therapy Suggested Charges     Code   Minutes Charges    None           Therapy Charges for Today     Code Description Service Date Service Provider Modifiers Qty    50801455318 HC PT EVAL LOW COMPLEXITY 4 10/5/2018 Hazel Tena, PT GP 1          PT G-Codes  Outcome Measure Options: Disabilities of the Arm, Shoulder, and Hand (DASH)         Hazel Tena, PT  10/5/2018

## 2018-10-09 ENCOUNTER — HOSPITAL ENCOUNTER (OUTPATIENT)
Dept: PHYSICAL THERAPY | Facility: HOSPITAL | Age: 58
Setting detail: THERAPIES SERIES
Discharge: HOME | End: 2018-10-09

## 2018-10-09 DIAGNOSIS — I89.0 LYMPHEDEMA OF BREAST: Primary | ICD-10-CM

## 2018-10-09 PROCEDURE — 97140 MANUAL THERAPY 1/> REGIONS: CPT

## 2018-10-09 NOTE — THERAPY TREATMENT NOTE
Outpatient Physical Therapy Lymphedema Treatment Note   [facility]     Patient Name: Samanta Mera  : 1960  MRN: [id number]  Today's Date: 10/9/2018        Visit Date: 10/09/2018    Visit Dx:    ICD-10-CM ICD-9-CM   1. Lymphedema of breast I89.0 457.1       Patient Active Problem List   Diagnosis   • Malignant neoplasm of right breast in female, estrogen receptor positive (CMS/HCC)   • History of breast cancer              Lymphedema     Row Name 10/09/18 1000             Subjective Pain    Able to rate subjective pain? yes  -LF      Pre-Treatment Pain Level 0  -LF      Post-Treatment Pain Level 0  -LF         Subjective Comments    Subjective Comments Feels symptoms about the same.  Notices more discomfort at night once she takes her bra off  -LF         Lymphedema Edema Assessment    Edema Assessment Comment mild edema right breast, more inferior  -LF         Skin Changes/Observations    Location/Assessment Upper Quadrant  -LF      Upper Quadrant Conditions right:;intact;clean  -LF      Upper Quadrant Color/Pigment --   reddish pink right breast   -LF         Manual Lymphatic Drainage    Manual Lymphatic Drainage initial sequence;opened regional lymph nodes;opened anastamoses  -LF      Initial Sequence supraclavicular;shoulder collectors  -LF      Supraclavicular right;left  -LF      Shoulder Collectors right;left  -LF      Opened Regional Lymph Nodes axillary;inguinal;other lymph nodes  -LF      Axillary right  -LF      Inguinal right  -LF      Other Regional Lymph Nodes parasternal  -LF      Opened Anastamoses axillo-inguinal  -LF      Axillo-Inguinal right  -LF      Manual Lymphatic Drainage Comments MLD across right breast several sequences, and then AI anastomoses  -LF      Manual Therapy 1 strip kineseotape applied anchored toward R ASIS and fanned towards inferior and lateral breast, lateral chest  -LF         Compression/Skin Care    Compression/Skin Care Comments discussed compression options.   Instructed in care of removing KT tape.  Can leave on several days, but remove sooner if irritation  -LF        User Key  (r) = Recorded By, (t) = Taken By, (c) = Cosigned By    Initials Name Provider Type    Hazel Lopez PT Physical Therapist                  PT Assessment/Plan     Row Name 10/09/18 1000          PT Assessment    Assessment Comments appears to have some improvement in symptoms.  She is compliant with HEP, and plans to get new compression garment to wear rather than her regular bra which should further help symptoms.  -LF        PT Plan    PT Plan Comments follow-up next week  -LF       User Key  (r) = Recorded By, (t) = Taken By, (c) = Cosigned By    Initials Name Provider Type    Hazel Lopez PT Physical Therapist                     Exercises     Row Name 10/09/18 1000             Subjective Comments    Subjective Comments Feels symptoms about the same.  Notices more discomfort at night once she takes her bra off  -LF         Subjective Pain    Able to rate subjective pain? yes  -LF      Pre-Treatment Pain Level 0  -LF      Post-Treatment Pain Level 0  -LF         Total Minutes    35917 - PT Manual Therapy Minutes 38  -LF        User Key  (r) = Recorded By, (t) = Taken By, (c) = Cosigned By    Initials Name Provider Type    Hazel Lopez PT Physical Therapist          Time Calculation:   Start Time: 1000   Therapy Suggested Charges     Code   Minutes Charges    56781 (CPT®)  Pt Neuromusc Re Education Ea 15 Min      10345 (CPT®) Hc Pt Ther Proc Ea 15 Min      15497 (CPT®) Hc Gait Training Ea 15 Min      16564 (CPT®) Hc Pt Therapeutic Act Ea 15 Min      38286 (CPT®) Hc Pt Manual Therapy Ea 15 Min 38 3    11406 (CPT®) Hc Pt Ther Massage- Per 15 Min      48746 (CPT®) Hc Pt Iontophoresis Ea 15 Min      43620 (CPT®) Hc Pt Elec Stim Ea-Per 15 Min      11013 (CPT®) Hc Pt Ultrasound Ea 15 Min      85172 (CPT®)  Pt Self Care/Mgmt/Train Ea 15 Min      22360 (CPT®) Hc Pt  Prosthetic (S) Train Initial Encounter, Each 15 Min      94986 (CPT®) Hc Orthotic(S) Mgmt/Train Initial Encounter, Each 15min      72923 (CPT®) Hc Pt Aquatic Therapy Ea 15 Min      81139 (CPT®) Hc Pt Orthotic(S)/Prosthetic(S) Encounter, Each 15 Min       (CPT®) Hc Pt Electrical Stim Unattended      Total  38 3        Therapy Charges for Today     Code Description Service Date Service Provider Modifiers Qty    89682122961 HC PT MANUAL THERAPY EA 15 MIN 10/9/2018 Hazel Tena, PT GP 3                    Hazel Tena, PT  10/9/2018

## 2018-10-22 ENCOUNTER — HOSPITAL ENCOUNTER (OUTPATIENT)
Dept: PHYSICAL THERAPY | Facility: HOSPITAL | Age: 58
Setting detail: THERAPIES SERIES
Discharge: HOME | End: 2018-10-22

## 2018-10-22 DIAGNOSIS — I89.0 LYMPHEDEMA OF BREAST: Primary | ICD-10-CM

## 2018-10-22 PROCEDURE — 97140 MANUAL THERAPY 1/> REGIONS: CPT

## 2018-10-22 NOTE — THERAPY TREATMENT NOTE
Outpatient Physical Therapy Lymphedema Treatment Note   [place]     Patient Name: Samanta Mera  : 1960  MRN: [id number]  Today's Date: 10/22/2018        Visit Date: 10/22/2018    Visit Dx:    ICD-10-CM ICD-9-CM   1. Lymphedema of breast I89.0 457.1       Patient Active Problem List   Diagnosis   • Malignant neoplasm of right breast in female, estrogen receptor positive (CMS/HCC)   • History of breast cancer              Lymphedema     Row Name 10/22/18 1000             Subjective Pain    Able to rate subjective pain? yes  -LF      Pre-Treatment Pain Level 1  -LF      Post-Treatment Pain Level 0  -LF         Subjective Comments    Subjective Comments complains tenderness at rib just below her breast  -LF         Lymphedema Edema Assessment    Edema Assessment Comment mild-moderate edema right breast extending more superiorly today, mild firmness/thickening  -LF         Skin Changes/Observations    Skin Observations Comment right breast pinkish discoloration, with area just superior-lateral with slightly increased reddish-pink discoloration compared to other area.  No tenderness, and denies fevers/chills.  Skin intact  -LF         Manual Lymphatic Drainage    Manual Lymphatic Drainage initial sequence;opened regional lymph nodes;opened anastamoses  -LF      Initial Sequence supraclavicular;shoulder collectors  -LF      Supraclavicular right;left  -LF      Shoulder Collectors right;left  -LF      Opened Regional Lymph Nodes axillary;inguinal;other lymph nodes  -LF      Axillary right  -LF      Inguinal right  -LF      Other Regional Lymph Nodes parasternal  -LF      Opened Anastamoses axillo-inguinal  -LF      Axillo-Inguinal right  -LF      Manual Lymphatic Drainage Comments MLD right breast  -LF      Manual Therapy Applied 1/2 piece kineseotape, split in 2 strips and anchored just above clavice and extended to superior breast.  then applied 1 piece kineseotape split into 4 strips and extended to axilla  and inferior, lateral breast.  -LF         Compression/Skin Care    Compression/Skin Care Comments Provided with foam chip pack made w/ compressogrip and instructed to use at home prn, reposition as she sees helpful.  -LF        User Key  (r) = Recorded By, (t) = Taken By, (c) = Cosigned By    Initials Name Provider Type     Hazel Tena, PT Physical Therapist                  PT Assessment/Plan     Row Name 10/22/18 1000          PT Assessment    Assessment Comments Increased right breast fullness today.  Modified KT tape and provided chip pack to use for added compression.    -LF        PT Plan    PT Plan Comments cont. for CDT/MLD to address right breast edema  -LF       User Key  (r) = Recorded By, (t) = Taken By, (c) = Cosigned By    Initials Name Provider Type     Hazel Tena, PT Physical Therapist                     Exercises     Row Name 10/22/18 1000             Subjective Comments    Subjective Comments complains tenderness at rib just below her breast  -LF         Subjective Pain    Able to rate subjective pain? yes  -LF      Pre-Treatment Pain Level 1  -LF      Post-Treatment Pain Level 0  -LF         Total Minutes    68898 - PT Manual Therapy Minutes 55  -LF        User Key  (r) = Recorded By, (t) = Taken By, (c) = Cosigned By    Initials Name Provider Type     Hazel Tena, PT Physical Therapist        Time Calculation:   Start Time: 1000   Therapy Suggested Charges     Code   Minutes Charges    72248 (CPT®)  Pt Neuromusc Re Education Ea 15 Min      89501 (CPT®)  Pt Ther Proc Ea 15 Min      53939 (CPT®) Hc Gait Training Ea 15 Min      91835 (CPT®) Hc Pt Therapeutic Act Ea 15 Min      55011 (CPT®)  Pt Manual Therapy Ea 15 Min 55 4    58872 (CPT®) Hc Pt Ther Massage- Per 15 Min      15811 (CPT®)  Pt Iontophoresis Ea 15 Min      08166 (CPT®)  Pt Elec Stim Ea-Per 15 Min      02674 (CPT®)  Pt Ultrasound Ea 15 Min      39813 (CPT®)  Pt Self Care/Mgmt/Train Ea 15 Min       23131 (CPT®) Hc Pt Prosthetic (S) Train Initial Encounter, Each 15 Min      00631 (CPT®) Hc Orthotic(S) Mgmt/Train Initial Encounter, Each 15min      08706 (CPT®) Hc Pt Aquatic Therapy Ea 15 Min      22884 (CPT®) Hc Pt Orthotic(S)/Prosthetic(S) Encounter, Each 15 Min       (CPT®) Hc Pt Electrical Stim Unattended      Total  55 4        Therapy Charges for Today     Code Description Service Date Service Provider Modifiers Qty    03887642307 HC PT MANUAL THERAPY EA 15 MIN 10/22/2018 Hazel Tena, PT GP 4                    Hazel Tena, PT  10/22/2018

## 2018-10-26 ENCOUNTER — HOSPITAL ENCOUNTER (OUTPATIENT)
Dept: PHYSICAL THERAPY | Facility: HOSPITAL | Age: 58
Setting detail: THERAPIES SERIES
Discharge: HOME | End: 2018-10-26

## 2018-10-26 DIAGNOSIS — I89.0 LYMPHEDEMA OF BREAST: Primary | ICD-10-CM

## 2018-10-26 PROCEDURE — 97140 MANUAL THERAPY 1/> REGIONS: CPT

## 2018-10-26 NOTE — THERAPY TREATMENT NOTE
Outpatient Physical Therapy Lymphedema Treatment Note   [place]     Patient Name: Samanta Mera  : 1960  MRN: [id number]  Today's Date: 10/26/2018        Visit Date: 10/26/2018    Visit Dx:    ICD-10-CM ICD-9-CM   1. Lymphedema of breast I89.0 457.1       Patient Active Problem List   Diagnosis   • Malignant neoplasm of right breast in female, estrogen receptor positive (CMS/HCC)   • History of breast cancer              Lymphedema     Row Name 10/26/18 0900             Subjective Pain    Able to rate subjective pain? yes  -LF      Pre-Treatment Pain Level 0  -LF      Post-Treatment Pain Level 0  -LF         Subjective Comments    Subjective Comments feels tape has helped lower breast and rib symptoms - not having any pain here today.  Could not tell that the foam chip pack helped  -LF         Lymphedema Edema Assessment    Edema Assessment Comment inferior breast tissue softening.  Mild firmness/thickening superior breast in area where there is localized area increased redness  -LF         Skin Changes/Observations    Skin Observations Comment faded pink color around breast, with reddish-pink area superior breast.  Not warm, nor tender to touch.  Outlined with pen to help patient monitor for changes  -LF         Manual Lymphatic Drainage    Manual Lymphatic Drainage initial sequence;opened regional lymph nodes;opened anastamoses  -LF      Initial Sequence supraclavicular;shoulder collectors;abdomen  -LF      Supraclavicular right;left  -LF      Shoulder Collectors right;left  -LF      Abdomen superficial  -LF      Opened Regional Lymph Nodes axillary;inguinal;other lymph nodes  -LF      Axillary right;left  -LF      Inguinal right  -LF      Other Regional Lymph Nodes parasternal  -LF      Opened Anastamoses axillo-inguinal;anterior axillo-axillary  -LF      Anterior Axillo-Axillary right to left  -LF      Axillo-Inguinal right  -LF      Manual Lymphatic Drainage Comments MLD right breast after working  initial sequences  -LF      Manual Therapy Applied 1 piece kineseotape anchored towards R ASIS and split to 4 strips that were fanned towards inferior and lateral breast.  Applied 2nd piece of kineseotape anchoared SC fossa with 4 strips fanned superior breast  -LF        User Key  (r) = Recorded By, (t) = Taken By, (c) = Cosigned By    Initials Name Provider Type     Hazel Tena, PT Physical Therapist                  PT Assessment/Plan     Row Name 10/26/18 0900          PT Assessment    Assessment Comments lower breast edema improved, but now with more localized edema upper breast tissue.  Patient to monitor for signs increased redness or skin changes and seek M.D. follow-up if so.  -LF        PT Plan    PT Plan Comments follow-up next week  -       User Key  (r) = Recorded By, (t) = Taken By, (c) = Cosigned By    Initials Name Provider Type     Hazel Tena, PT Physical Therapist                     Exercises     Row Name 10/26/18 0900             Subjective Comments    Subjective Comments feels tape has helped lower breast and rib symptoms - not having any pain here today.  Could not tell that the foam chip pack helped  -         Subjective Pain    Able to rate subjective pain? yes  -LF      Pre-Treatment Pain Level 0  -LF      Post-Treatment Pain Level 0  -LF         Total Minutes    13734 - PT Manual Therapy Minutes 55  -LF        User Key  (r) = Recorded By, (t) = Taken By, (c) = Cosigned By    Initials Name Provider Type     Hazel Tena, PT Physical Therapist            Time Calculation:   Start Time: 0900   Therapy Suggested Charges     Code   Minutes Charges    86529 (CPT®)  Pt Neuromusc Re Education Ea 15 Min      32971 (CPT®)  Pt Ther Proc Ea 15 Min      79363 (CPT®) Hc Gait Training Ea 15 Min      89770 (CPT®)  Pt Therapeutic Act Ea 15 Min      30541 (CPT®)  Pt Manual Therapy Ea 15 Min 55 4    46885 (CPT®)  Pt Ther Massage- Per 15 Min      08590 (CPT®)  Pt  Iontophoresis Ea 15 Min      71952 (CPT®) Hc Pt Elec Stim Ea-Per 15 Min      55216 (CPT®) Hc Pt Ultrasound Ea 15 Min      01560 (CPT®) Hc Pt Self Care/Mgmt/Train Ea 15 Min      59284 (CPT®) Hc Pt Prosthetic (S) Train Initial Encounter, Each 15 Min      18465 (CPT®) Hc Orthotic(S) Mgmt/Train Initial Encounter, Each 15min      41454 (CPT®) Hc Pt Aquatic Therapy Ea 15 Min      77463 (CPT®) Hc Pt Orthotic(S)/Prosthetic(S) Encounter, Each 15 Min       (CPT®) Hc Pt Electrical Stim Unattended      Total  55 4        Therapy Charges for Today     Code Description Service Date Service Provider Modifiers Qty    54423931747 HC PT MANUAL THERAPY EA 15 MIN 10/26/2018 Hazel Tena, PT GP 4                    Hazel Tena, PT  10/26/2018

## 2018-10-30 ENCOUNTER — HOSPITAL ENCOUNTER (OUTPATIENT)
Dept: RADIATION ONCOLOGY | Facility: HOSPITAL | Age: 58
Setting detail: RADIATION/ONCOLOGY SERIES
Discharge: HOME | End: 2018-10-30

## 2018-10-30 ENCOUNTER — HOSPITAL ENCOUNTER (OUTPATIENT)
Dept: PHYSICAL THERAPY | Facility: HOSPITAL | Age: 58
Setting detail: THERAPIES SERIES
Discharge: HOME | End: 2018-10-30

## 2018-10-30 DIAGNOSIS — I89.0 LYMPHEDEMA OF BREAST: Primary | ICD-10-CM

## 2018-10-30 PROCEDURE — 97140 MANUAL THERAPY 1/> REGIONS: CPT

## 2018-10-30 NOTE — THERAPY PROGRESS REPORT/RE-CERT
Physical Therapy Lymphedema Progress Note  [facility]     Patient Name: Samanta Mera  : 1960  MRN: [id number]  Today's Date: 10/30/2018      Visit Date: 10/30/2018    Visit Dx:    ICD-10-CM ICD-9-CM   1. Lymphedema of breast I89.0 457.1       Patient Active Problem List   Diagnosis   • Malignant neoplasm of right breast in female, estrogen receptor positive (CMS/HCC)   • History of breast cancer        Past Medical History:   Diagnosis Date   • Malignant neoplasm of right breast in female, estrogen receptor positive (CMS/HCC) 3/7/2018        Past Surgical History:   Procedure Laterality Date   •  SECTION      X 3   • HYSTERECTOMY      age 55   • OOPHORECTOMY         Visit Dx:    ICD-10-CM ICD-9-CM   1. Lymphedema of breast I89.0 457.1                 Lymphedema     Row Name 10/30/18 1000             Subjective Pain    Able to rate subjective pain? yes  -LF      Pre-Treatment Pain Level 0  -LF      Post-Treatment Pain Level 0  -LF         Subjective Comments    Subjective Comments Feels symptoms might be a  little better since starting therapy. Not having pain.  Has stopped wearing underwire bra, and switched to a sports bra that hits lower on her ribs, as well a supportive camisole and she feels this has been helpful.  Expressess concern with color changes at her breast  -LF         Lymphedema Edema Assessment    Edema Assessment Comment mild edema right breast more inferior-lateral  -LF         Skin Changes/Observations    Skin Observations Comment Continues with localized area redness superior breast that was new from last week.  Faint reddish-pink discoloration symmetric around central breast.  Not tender to palpation  -LF         Lymphedema Measurements    Measurement Type(s) Quick Girth  -LF      Quick Girth Areas Upper extremities  -LF         LUE Quick Girth (cm)    Axilla 30 cm  -LF      Mid upper arm 27.7 cm  -LF      Elbow 24 cm  -LF      Mid forearm 21.3 cm  -LF      Wrist crease 15 cm   -LF         RUE Quick Girth (cm)    Axilla 15 cm  -LF      Mid upper arm 20.4 cm  -LF      Elbow 24 cm  -LF      Mid forearm 27 cm  -LF      Wrist crease 32.7 cm  -LF         Manual Lymphatic Drainage    Manual Lymphatic Drainage initial sequence;opened regional lymph nodes;opened anastamoses  -LF      Initial Sequence supraclavicular;shoulder collectors  -LF      Supraclavicular right;left  -LF      Shoulder Collectors right;left  -LF      Opened Regional Lymph Nodes axillary;inguinal  -LF      Axillary right;left  -LF      Inguinal right  -LF      Other Regional Lymph Nodes parasternal  -LF      Opened Anastamoses axillo-inguinal  -LF      Axillo-Inguinal right  -LF      Manual Lymphatic Drainage Comments deferred MLD directly over breast today, as well as taping  -LF        User Key  (r) = Recorded By, (t) = Taken By, (c) = Cosigned By    Initials Name Provider Type     Hazel Tena, PT Physical Therapist                Exercises     Row Name 10/30/18 1000             Subjective Comments    Subjective Comments Feels symptoms might be a  little better since starting therapy. Not having pain.  Has stopped wearing underwire bra, and switched to a sports bra that hits lower on her ribs, as well a supportive camisole and she feels this has been helpful.  Expressess concern with color changes at her breast  -LF         Subjective Pain    Able to rate subjective pain? yes  -LF      Pre-Treatment Pain Level 0  -LF      Post-Treatment Pain Level 0  -LF         Total Minutes    04099 - PT Manual Therapy Minutes 32  -LF        User Key  (r) = Recorded By, (t) = Taken By, (c) = Cosigned By    Initials Name Provider Type     Hazel Tena, PT Physical Therapist                    PT OP Goals     Row Name 10/30/18 1000          PT Short Term Goals    STG Date to Achieve 11/02/18  -LF     STG 1 Patient to report 50% improvement right breast pain  -LF     STG 1 Progress Met  -LF     STG 2 Soft tissue swelling,  inflammation, or restriction will be reduced by 25 %   -LF     STG 2 Progress Ongoing  -LF        Long Term Goals    LTG Date to Achieve 11/16/18  -LF     LTG 1 Patient independent with self-management of  lymphedema to include skin care, self-MLD, compression, and exercise  -LF     LTG 1 Progress Partially Met;Ongoing  -LF     LTG 2 Right breast pain and swelling resolved >/= 75%  -LF     LTG 2 Progress Ongoing  -LF       User Key  (r) = Recorded By, (t) = Taken By, (c) = Cosigned By    Initials Name Provider Type     Hazel Tena PT Physical Therapist                PT Assessment/Plan     Row Name 10/30/18 1000          PT Assessment    Functional Limitations Other (comment)   edema management  -LF     Impairments Edema;Impaired lymphatic circulation;Pain  -LF     Assessment Comments Patient continues with right breast swelling.  Better today than last week, but overall not a significant improvement since IE.  Denies pain or tenderness.  Continues with area redness superior breast new from last week that appears unchanged.  Discussed following up with M.D. to ensure no underlying skin problem/infection and she is able to get there today.  Otherwise will cont. treatment to further manage symptoms.    -LF     Please refer to paper survey for additional self-reported information Yes  -LF     Rehab Potential Good  -LF     Patient/caregiver participated in establishment of treatment plan and goals Yes  -LF     Patient would benefit from skilled therapy intervention Yes  -LF        PT Plan    PT Frequency 2x/week  -LF     Planned CPT's? PT RE-EVAL: 15262;PT THER PROC EA 15 MIN: 47699;PT MANUAL THERAPY EA 15 MIN: 72588  -LF       User Key  (r) = Recorded By, (t) = Taken By, (c) = Cosigned By    Initials Name Provider Type     Hazel Tena PT Physical Therapist             Outcome Measure Options: Disabilities of the Arm, Shoulder, and Hand (DASH)  DASH  Open a tight or new jar.: Mild Difficulty  Write: No  Difficulty  Turn a key: No Difficulty  Prepare a meal: No Difficulty  Push open a heavy door: No Difficulty  Place an object on a shelf above your head: No Difficulty  Do heavy household chores (e.g., wash walls, wash floors): No Difficulty  Garden or do yard work: No Difficulty  Make a bed: No Difficulty  Carry a shopping bag or briefcase: No Difficulty  Carry a heavy object (over 10 lbs): No Difficulty  Change a lightbulb overhead: No Difficulty  Wash or blow dry your hair: No Difficulty  Wash your back: No Difficulty  Put on a pullover sweater: No Difficulty  Use a knife to cut food: No Difficulty  Recreational activities in which require little effort (e.g., cardplaying, knitting, etc.): No Difficulty  Recreational activities in which you take some force or impact through your arm, should or hand (e.g. golf, hammering, tennis, etc.): No Difficulty  Recreational Activities in which you move your arm freely (e.g., frisbee, badminton, etc.): No Difficulty  Manage transportation needs (getting from one place to another): No Difficulty  Sexual Activities: No Difficulty  During the past week, to what extent has your arm, shoulder, or hand problem interfered with your normal social activites with family, friends, neighbors or groups?: Not at all  During the past week, were you limited in your work or other regular daily activities as a result of your arm, shoulder or hand problem?: Not limited at all  Arm, Shoulder, or hand pain: None  Arm, shoulder or hand pain when you performed any specific activity: None  Tingling (pins and needles) in your arm, shoulder, or hand: Mild  Weakness in your arm, shoulder or hand: None  Stiffness in your arm, shoulder or hand: None  During the past week, how much difficulty have you had sleeping because of the pain in your arm, shoulder or hand?: No difficulty  I feel less capable, less confident or less useful because of my arm, shoulder or hand problem: Strongly disagree  DASH Sum :  32  Number of Questions Answered: 30  DASH Score: 1.67  Work Module (Optional)  Using your usual technique for your work?: No Difficulty  Doing your usual work because of arm, shoulder or hand pain?: No Difficulty  Doing your work as well as you would like?: No Difficulty  Spending your usual amount of time doing your work?: No Difficulty  Work Module Score: 0         Time Calculation:   Start Time: 1000   Therapy Suggested Charges     Code   Minutes Charges    89634 (CPT®) Hc Pt Neuromusc Re Education Ea 15 Min      60973 (CPT®) Hc Pt Ther Proc Ea 15 Min      61128 (CPT®) Hc Gait Training Ea 15 Min      39091 (CPT®) Hc Pt Therapeutic Act Ea 15 Min      26032 (CPT®) Hc Pt Manual Therapy Ea 15 Min 32 2    40609 (CPT®) Hc Pt Ther Massage- Per 15 Min      09359 (CPT®) Hc Pt Iontophoresis Ea 15 Min      68567 (CPT®) Hc Pt Elec Stim Ea-Per 15 Min      43386 (CPT®) Hc Pt Ultrasound Ea 15 Min      49894 (CPT®) Hc Pt Self Care/Mgmt/Train Ea 15 Min      70486 (CPT®) Hc Pt Prosthetic (S) Train Initial Encounter, Each 15 Min      99685 (CPT®) Hc Orthotic(S) Mgmt/Train Initial Encounter, Each 15min      51908 (CPT®) Hc Pt Aquatic Therapy Ea 15 Min      85461 (CPT®) Hc Pt Orthotic(S)/Prosthetic(S) Encounter, Each 15 Min       (CPT®) Hc Pt Electrical Stim Unattended      Total  32 2        Therapy Charges for Today     Code Description Service Date Service Provider Modifiers Qty    73602189232 HC PT MANUAL THERAPY EA 15 MIN 10/30/2018 Hazel Tena, PT GP 2          PT G-Codes  Outcome Measure Options: Disabilities of the Arm, Shoulder, and Hand (DASH)         Hazel Tena, PT  10/30/2018

## 2018-11-01 ENCOUNTER — OFFICE VISIT (OUTPATIENT)
Dept: RADIATION ONCOLOGY | Facility: HOSPITAL | Age: 58
End: 2018-11-01

## 2018-11-01 ENCOUNTER — HOSPITAL ENCOUNTER (OUTPATIENT)
Dept: RADIATION ONCOLOGY | Facility: HOSPITAL | Age: 58
Setting detail: RADIATION/ONCOLOGY SERIES
Discharge: HOME | End: 2018-11-01

## 2018-11-01 VITALS
HEART RATE: 67 BPM | WEIGHT: 147.8 LBS | SYSTOLIC BLOOD PRESSURE: 116 MMHG | HEIGHT: 66 IN | RESPIRATION RATE: 18 BRPM | DIASTOLIC BLOOD PRESSURE: 68 MMHG | BODY MASS INDEX: 23.75 KG/M2 | TEMPERATURE: 98.1 F

## 2018-11-01 DIAGNOSIS — Z85.3 HISTORY OF BREAST CANCER: Primary | ICD-10-CM

## 2018-11-01 PROCEDURE — G0463 HOSPITAL OUTPT CLINIC VISIT: HCPCS

## 2018-11-01 NOTE — PROGRESS NOTES
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  She received radiotherapy of 40.05 Gy in 15 fractions to the right breast.  She had no acute side effects when she was seen a month after treatment.    She has recently been undergoing physical therapy for lymphedema of the right breast.  She came in today because she felt it wasn't getting better. She complains of increased swelling and redness.  She continues to work full time.            MEDICATIONS: Medication reconciliation for the patient was reviewed and confirmed in the electronic medical record.    Review of Systems   All other systems reviewed and are negative.      KPS 90%    Physical Exam   Constitutional: She appears well-developed and well-nourished.   Cardiovascular: Normal rate.    Pulmonary/Chest: Effort normal.   Nursing note and vitals reviewed.  The examination of the right breast reveals erythema of the breast and edema.  There is mild calor.    VITAL SIGNS:   Vitals:    18 1427   BP: 116/68   Pulse: 67   Resp: 18   Temp: 98.1 °F (36.7 °C)   Weight: 67 kg (147 lb 12.8 oz)   Height: 167.6 cm (66\")   PainSc: 0-No pain       The following portions of the patient's history were reviewed and updated as appropriate: allergies, " current medications, past family history, past medical history, past social history, past surgical history and problem list.          IMPRESSION: Cellulitis of the right breast.  When asked specifically Mrs. Mera states she has had some malaise and fatigue.      RECOMMENDATIONS:  I'm going to refer Mrs. Mera to one of our infectious disease colleagues for cellulitis. I am not going to empiricallystart an antibiotic.  She feels she can wait until next week for the appointment but will clear her schedule as needed.  We will call her with the information.      Return for PRN.    Saumya Call MD    Errors in dictation may reflect use of voice recognition software and not all errors in transcription may have been detected prior to signing.

## 2018-11-07 ENCOUNTER — TRANSCRIBE ORDERS (OUTPATIENT)
Dept: LAB | Facility: HOSPITAL | Age: 58
End: 2018-11-07

## 2018-11-07 ENCOUNTER — LAB (OUTPATIENT)
Dept: LAB | Facility: HOSPITAL | Age: 58
End: 2018-11-07

## 2018-11-07 ENCOUNTER — APPOINTMENT (OUTPATIENT)
Dept: PHYSICAL THERAPY | Facility: HOSPITAL | Age: 58
End: 2018-11-07

## 2018-11-07 DIAGNOSIS — N61.0 INFLAMMATORY DISEASE OF BREAST: Primary | ICD-10-CM

## 2018-11-07 DIAGNOSIS — N61.0 INFLAMMATORY DISEASE OF BREAST: ICD-10-CM

## 2018-11-07 LAB
ALBUMIN SERPL-MCNC: 4.31 G/DL (ref 3.2–4.8)
ALBUMIN/GLOB SERPL: 1.7 G/DL (ref 1.5–2.5)
ALP SERPL-CCNC: 58 U/L (ref 25–100)
ALT SERPL W P-5'-P-CCNC: 22 U/L (ref 7–40)
ANION GAP SERPL CALCULATED.3IONS-SCNC: 3 MMOL/L (ref 3–11)
AST SERPL-CCNC: 27 U/L (ref 0–33)
BASOPHILS # BLD AUTO: 0 10*3/MM3 (ref 0–0.2)
BASOPHILS NFR BLD AUTO: 0 % (ref 0–1)
BILIRUB SERPL-MCNC: 0.4 MG/DL (ref 0.3–1.2)
BUN BLD-MCNC: 18 MG/DL (ref 9–23)
BUN/CREAT SERPL: 21.2 (ref 7–25)
CALCIUM SPEC-SCNC: 9.6 MG/DL (ref 8.7–10.4)
CHLORIDE SERPL-SCNC: 103 MMOL/L (ref 99–109)
CO2 SERPL-SCNC: 32 MMOL/L (ref 20–31)
CREAT BLD-MCNC: 0.85 MG/DL (ref 0.6–1.3)
CRP SERPL-MCNC: 0.15 MG/DL (ref 0–1)
DEPRECATED RDW RBC AUTO: 43.4 FL (ref 37–54)
EOSINOPHIL # BLD AUTO: 0 10*3/MM3 (ref 0–0.3)
EOSINOPHIL NFR BLD AUTO: 0 % (ref 0–3)
ERYTHROCYTE [DISTWIDTH] IN BLOOD BY AUTOMATED COUNT: 12.9 % (ref 11.3–14.5)
ERYTHROCYTE [SEDIMENTATION RATE] IN BLOOD: 30 MM/HR (ref 0–30)
GFR SERPL CREATININE-BSD FRML MDRD: 69 ML/MIN/1.73
GLOBULIN UR ELPH-MCNC: 2.6 GM/DL
GLUCOSE BLD-MCNC: 88 MG/DL (ref 70–100)
HCT VFR BLD AUTO: 42.5 % (ref 34.5–44)
HGB BLD-MCNC: 13.6 G/DL (ref 11.5–15.5)
IMM GRANULOCYTES # BLD: 0.01 10*3/MM3 (ref 0–0.03)
IMM GRANULOCYTES NFR BLD: 0.2 % (ref 0–0.6)
LYMPHOCYTES # BLD AUTO: 1.63 10*3/MM3 (ref 0.6–4.8)
LYMPHOCYTES NFR BLD AUTO: 32.5 % (ref 24–44)
MCH RBC QN AUTO: 29.3 PG (ref 27–31)
MCHC RBC AUTO-ENTMCNC: 32 G/DL (ref 32–36)
MCV RBC AUTO: 91.6 FL (ref 80–99)
MONOCYTES # BLD AUTO: 0.45 10*3/MM3 (ref 0–1)
MONOCYTES NFR BLD AUTO: 9 % (ref 0–12)
NEUTROPHILS # BLD AUTO: 2.94 10*3/MM3 (ref 1.5–8.3)
NEUTROPHILS NFR BLD AUTO: 58.5 % (ref 41–71)
PLATELET # BLD AUTO: 228 10*3/MM3 (ref 150–450)
PMV BLD AUTO: 11.3 FL (ref 6–12)
POTASSIUM BLD-SCNC: 5.2 MMOL/L (ref 3.5–5.5)
PROT SERPL-MCNC: 6.9 G/DL (ref 5.7–8.2)
RBC # BLD AUTO: 4.64 10*6/MM3 (ref 3.89–5.14)
SODIUM BLD-SCNC: 138 MMOL/L (ref 132–146)
WBC NRBC COR # BLD: 5.02 10*3/MM3 (ref 3.5–10.8)

## 2018-11-07 PROCEDURE — 85652 RBC SED RATE AUTOMATED: CPT

## 2018-11-07 PROCEDURE — 80053 COMPREHEN METABOLIC PANEL: CPT

## 2018-11-07 PROCEDURE — 85025 COMPLETE CBC W/AUTO DIFF WBC: CPT

## 2018-11-07 PROCEDURE — 36415 COLL VENOUS BLD VENIPUNCTURE: CPT

## 2018-11-07 PROCEDURE — 86140 C-REACTIVE PROTEIN: CPT

## 2018-11-13 ENCOUNTER — OFFICE VISIT (OUTPATIENT)
Dept: ONCOLOGY | Facility: CLINIC | Age: 58
End: 2018-11-13

## 2018-11-13 VITALS
BODY MASS INDEX: 23.95 KG/M2 | WEIGHT: 149 LBS | RESPIRATION RATE: 16 BRPM | DIASTOLIC BLOOD PRESSURE: 88 MMHG | TEMPERATURE: 97.8 F | HEIGHT: 66 IN | HEART RATE: 71 BPM | OXYGEN SATURATION: 96 % | SYSTOLIC BLOOD PRESSURE: 137 MMHG

## 2018-11-13 DIAGNOSIS — C50.911 MALIGNANT NEOPLASM OF RIGHT BREAST IN FEMALE, ESTROGEN RECEPTOR POSITIVE, UNSPECIFIED SITE OF BREAST (HCC): Primary | ICD-10-CM

## 2018-11-13 DIAGNOSIS — Z17.0 MALIGNANT NEOPLASM OF RIGHT BREAST IN FEMALE, ESTROGEN RECEPTOR POSITIVE, UNSPECIFIED SITE OF BREAST (HCC): Primary | ICD-10-CM

## 2018-11-13 DIAGNOSIS — N89.8 VAGINAL DRYNESS: Primary | ICD-10-CM

## 2018-11-13 PROCEDURE — 99214 OFFICE O/P EST MOD 30 MIN: CPT | Performed by: NURSE PRACTITIONER

## 2018-11-13 RX ORDER — DAPTOMYCIN 50 MG/ML
INJECTION, POWDER, LYOPHILIZED, FOR SOLUTION INTRAVENOUS
COMMUNITY
End: 2019-02-13

## 2018-11-13 RX ORDER — MULTIVIT WITH MINERALS/LUTEIN
TABLET ORAL
Qty: 1 CAPSULE | Refills: 11 | OUTPATIENT
Start: 2018-11-13 | End: 2019-08-12

## 2018-11-13 RX ORDER — ANASTROZOLE 1 MG/1
1 TABLET ORAL DAILY
Qty: 30 TABLET | Refills: 5 | Status: SHIPPED | OUTPATIENT
Start: 2018-11-13 | End: 2018-11-13 | Stop reason: SDUPTHER

## 2018-11-13 RX ORDER — ANASTROZOLE 1 MG/1
1 TABLET ORAL DAILY
Qty: 30 TABLET | Refills: 5 | Status: SHIPPED | OUTPATIENT
Start: 2018-11-13 | End: 2019-06-29 | Stop reason: SDUPTHER

## 2018-11-13 NOTE — PROGRESS NOTES
"      PROBLEM LIST:  1. [id number] (stage IA) ER+ TN+ her2 negative invasive ductal carcinoma of the right breast  A) presented with an abnormality on screening mammogram.  She underwent lumpectomy on 2/27/18.  Pathology showed a 5 mm grade 1 IDC.  0/4 SLN involved.  B) radiation completed 4/18/18.  Anastrozole started April 2018.       Subjective     HISTORY OF PRESENT ILLNESS:   Samanta Mera returns for follow-up.   She has been taking anastrozole for about 2 or 3 weeks.  She continues to take Effexor.  She says she's feeling well.   She had been doing physical therapy for lymphedema on her right side.   It went unresloved for weeks.  She was sent back to follow up with Dr. Call and was diagnosed with  with cellulitis of the right breast and prescribed antibiotics.  She was also referred to ID. She saw ID and was put on 6 doses of Rocephin and 3 of daptomycin.  She has not had any relief from the antibiotics. She continues to remain fatigued.  She is working full time.   She is also complaining of extreme vaginal dryness and pain with sex.     Past Medical History, Past Surgical History, Social History, Family History have been reviewed and are without significant changes except as mentioned.    Review of Systems   A comprehensive 14 point review of systems was performed and was negative except as mentioned.    Medications:  The current medication list was reviewed in the EMR    ALLERGIES:    Allergies   Allergen Reactions   • No Known Drug Allergy        Objective      /88   Pulse 71   Temp 97.8 °F (36.6 °C) (Temporal)   Resp 16   Ht 167.6 cm (66\")   Wt 67.6 kg (149 lb)   SpO2 96%   BMI 24.05 kg/m²      Performance Status: 0    General: well appearing female in no acute distress  Neuro: alert and oriented  HEENT: sclera anicteric, oropharynx clear  Lymphatics: no cervical, supraclavicular, or axillary adenopathy  Cardiovascular: regular rate and rhythm, no murmurs  Lungs: clear to auscultation " bilaterally  Abdomen: soft, nontender, nondistended.  No palpable organomegaly  Extremeties: no lower extremity edema  Skin: no rashes, lesions, bruising, or petechiae  Psych: mood and affect appropriate            Assessment/Plan   Samanta Mera is a 58 y.o. year old female with stage IA er+ her2 negative breast cancer.  She completed radiation therapy and has started taking anastrozole.  We will continue anastrozole and  I asked her to call if she has any ongoing issues with her medication. I will refill anastrozole today. We will also continue with Effexor for mood and hot flashes.     She is past due for her diagnostic mammogram, but in light of her current infection we will postpone until she has been released by ID.  I will order this prior to return and she will schedule once ID releases her and her cellulitis is resolved.     Cellulitis of right breast: She will follow up with ID for management of right breast cellulitis.     She will need a repeat bone density scan in March 2019. I will order this prior to return.        Vaginal Dryness: We will call in a coconut/Vitamin E prescription today. She will notify us if this does not help with her vaginal dryness.           Visit time was 25 minutes, greater than 50% spent in counseling      Sridevi ADAMS  [facility]    11/13/2018          CC:

## 2018-11-30 ENCOUNTER — DOCUMENTATION (OUTPATIENT)
Dept: PHYSICAL THERAPY | Facility: HOSPITAL | Age: 58
End: 2018-11-30

## 2018-11-30 DIAGNOSIS — I89.0 LYMPHEDEMA OF BREAST: Primary | ICD-10-CM

## 2018-12-03 ENCOUNTER — TRANSCRIBE ORDERS (OUTPATIENT)
Dept: ADMINISTRATIVE | Facility: HOSPITAL | Age: 58
End: 2018-12-03

## 2018-12-03 DIAGNOSIS — Z92.3 HISTORY OF RADIATION THERAPY: ICD-10-CM

## 2018-12-03 DIAGNOSIS — N61.0 CELLULITIS OF BREAST: Primary | ICD-10-CM

## 2018-12-26 ENCOUNTER — HOSPITAL ENCOUNTER (OUTPATIENT)
Dept: MAMMOGRAPHY | Facility: HOSPITAL | Age: 58
Discharge: HOME | End: 2018-12-26
Attending: INTERNAL MEDICINE | Admitting: INTERNAL MEDICINE

## 2018-12-26 ENCOUNTER — TRANSCRIBE ORDERS (OUTPATIENT)
Dept: MAMMOGRAPHY | Facility: HOSPITAL | Age: 58
End: 2018-12-26

## 2018-12-26 DIAGNOSIS — Z92.3 HISTORY OF RADIATION THERAPY: ICD-10-CM

## 2018-12-26 DIAGNOSIS — R92.8 ABNORMAL MAMMOGRAM: Primary | ICD-10-CM

## 2018-12-26 DIAGNOSIS — N61.0 CELLULITIS OF BREAST: ICD-10-CM

## 2018-12-26 PROCEDURE — 77066 DX MAMMO INCL CAD BI: CPT | Performed by: RADIOLOGY

## 2018-12-26 PROCEDURE — 77062 BREAST TOMOSYNTHESIS BI: CPT | Performed by: RADIOLOGY

## 2018-12-26 PROCEDURE — G0279 TOMOSYNTHESIS, MAMMO: HCPCS

## 2018-12-26 PROCEDURE — 77066 DX MAMMO INCL CAD BI: CPT

## 2019-01-20 NOTE — TELEPHONE ENCOUNTER
01.19.18 @ 1310:  Unable to reach referring provider office closed for the day. Pt notified of pathology results and recommendation. Verbalizes understanding. Denies discomfort. Denies any signs and symptoms of infection.Patient desires Dr Otto for surgical consult & will be notified of appointment. Told to bring photo ID, insurance cards & list of current medications. Patient was encouraged to call back with any questions or concerns. Patient verbalized understanding. Breast cancer information packet offered and accepted.    
Bladder non-tender and non-distended. Urine clear yellow.

## 2019-02-13 ENCOUNTER — OFFICE VISIT (OUTPATIENT)
Dept: FAMILY MEDICINE CLINIC | Facility: CLINIC | Age: 59
End: 2019-02-13

## 2019-02-13 ENCOUNTER — LAB (OUTPATIENT)
Dept: LAB | Facility: HOSPITAL | Age: 59
End: 2019-02-13

## 2019-02-13 VITALS
HEIGHT: 66 IN | WEIGHT: 149.4 LBS | DIASTOLIC BLOOD PRESSURE: 70 MMHG | SYSTOLIC BLOOD PRESSURE: 106 MMHG | BODY MASS INDEX: 24.01 KG/M2 | HEART RATE: 61 BPM | OXYGEN SATURATION: 96 %

## 2019-02-13 DIAGNOSIS — N95.1 HOT FLASHES DUE TO MENOPAUSE: ICD-10-CM

## 2019-02-13 DIAGNOSIS — Z78.0 MENOPAUSE: ICD-10-CM

## 2019-02-13 DIAGNOSIS — Z11.59 ENCOUNTER FOR HEPATITIS C SCREENING TEST FOR LOW RISK PATIENT: ICD-10-CM

## 2019-02-13 DIAGNOSIS — C50.411 PRIMARY MALIGNANT NEOPLASM OF UPPER OUTER QUADRANT OF RIGHT FEMALE BREAST (HCC): ICD-10-CM

## 2019-02-13 DIAGNOSIS — R05.9 COUGH: ICD-10-CM

## 2019-02-13 DIAGNOSIS — E55.9 VITAMIN D DEFICIENCY: ICD-10-CM

## 2019-02-13 DIAGNOSIS — Z12.11 SCREENING FOR COLON CANCER: ICD-10-CM

## 2019-02-13 DIAGNOSIS — Z76.89 ENCOUNTER TO ESTABLISH CARE: Primary | ICD-10-CM

## 2019-02-13 DIAGNOSIS — Z00.00 PHYSICAL EXAM, ANNUAL: ICD-10-CM

## 2019-02-13 DIAGNOSIS — R74.01 ELEVATED TRANSAMINASE LEVEL: ICD-10-CM

## 2019-02-13 PROBLEM — D64.9 ABSOLUTE ANEMIA: Status: ACTIVE | Noted: 2019-02-13

## 2019-02-13 PROBLEM — C44.90 SKIN CANCER: Status: ACTIVE | Noted: 2019-02-13

## 2019-02-13 PROBLEM — R94.31 ABNORMAL ECG: Status: ACTIVE | Noted: 2019-02-13

## 2019-02-13 LAB
25(OH)D3 SERPL-MCNC: 30.3 NG/ML
ALBUMIN SERPL-MCNC: 4.09 G/DL (ref 3.2–4.8)
ALBUMIN/GLOB SERPL: 1.6 G/DL (ref 1.5–2.5)
ALP SERPL-CCNC: 132 U/L (ref 25–100)
ALT SERPL W P-5'-P-CCNC: 54 U/L (ref 7–40)
ANION GAP SERPL CALCULATED.3IONS-SCNC: 5 MMOL/L (ref 3–11)
ARTICHOKE IGE QN: 116 MG/DL (ref 0–130)
AST SERPL-CCNC: 41 U/L (ref 0–33)
BASOPHILS # BLD AUTO: 0.04 10*3/MM3 (ref 0–0.2)
BASOPHILS NFR BLD AUTO: 0.6 % (ref 0–1)
BILIRUB SERPL-MCNC: 0.6 MG/DL (ref 0.3–1.2)
BUN BLD-MCNC: 17 MG/DL (ref 9–23)
BUN/CREAT SERPL: 17.7 (ref 7–25)
CALCIUM SPEC-SCNC: 9.4 MG/DL (ref 8.7–10.4)
CHLORIDE SERPL-SCNC: 103 MMOL/L (ref 99–109)
CHOLEST SERPL-MCNC: 212 MG/DL (ref 0–200)
CO2 SERPL-SCNC: 31 MMOL/L (ref 20–31)
CREAT BLD-MCNC: 0.96 MG/DL (ref 0.6–1.3)
DEPRECATED RDW RBC AUTO: 46 FL (ref 37–54)
EOSINOPHIL # BLD AUTO: 0 10*3/MM3 (ref 0–0.3)
EOSINOPHIL NFR BLD AUTO: 0 % (ref 0–3)
ERYTHROCYTE [DISTWIDTH] IN BLOOD BY AUTOMATED COUNT: 13.9 % (ref 11.3–14.5)
GFR SERPL CREATININE-BSD FRML MDRD: 59 ML/MIN/1.73
GLOBULIN UR ELPH-MCNC: 2.6 GM/DL
GLUCOSE BLD-MCNC: 87 MG/DL (ref 70–100)
HBA1C MFR BLD: 5.3 % (ref 4.8–5.6)
HCT VFR BLD AUTO: 39.1 % (ref 34.5–44)
HCV AB SER DONR QL: NORMAL
HDLC SERPL-MCNC: 71 MG/DL (ref 40–60)
HGB BLD-MCNC: 12.4 G/DL (ref 11.5–15.5)
IMM GRANULOCYTES # BLD AUTO: 0.02 10*3/MM3 (ref 0–0.05)
IMM GRANULOCYTES NFR BLD AUTO: 0.3 % (ref 0–0.6)
LYMPHOCYTES # BLD AUTO: 3.23 10*3/MM3 (ref 0.6–4.8)
LYMPHOCYTES NFR BLD AUTO: 51.8 % (ref 24–44)
MCH RBC QN AUTO: 29 PG (ref 27–31)
MCHC RBC AUTO-ENTMCNC: 31.7 G/DL (ref 32–36)
MCV RBC AUTO: 91.6 FL (ref 80–99)
MONOCYTES # BLD AUTO: 0.6 10*3/MM3 (ref 0–1)
MONOCYTES NFR BLD AUTO: 9.6 % (ref 0–12)
NEUTROPHILS # BLD AUTO: 2.36 10*3/MM3 (ref 1.5–8.3)
NEUTROPHILS NFR BLD AUTO: 38 % (ref 41–71)
PLATELET # BLD AUTO: 217 10*3/MM3 (ref 150–450)
PMV BLD AUTO: 11 FL (ref 6–12)
POTASSIUM BLD-SCNC: 4.8 MMOL/L (ref 3.5–5.5)
PROT SERPL-MCNC: 6.7 G/DL (ref 5.7–8.2)
RBC # BLD AUTO: 4.27 10*6/MM3 (ref 3.89–5.14)
SODIUM BLD-SCNC: 139 MMOL/L (ref 132–146)
TRIGL SERPL-MCNC: 83 MG/DL (ref 0–150)
TSH SERPL DL<=0.05 MIU/L-ACNC: 1.75 MIU/ML (ref 0.35–5.35)
WBC NRBC COR # BLD: 6.23 10*3/MM3 (ref 3.5–10.8)

## 2019-02-13 PROCEDURE — 83036 HEMOGLOBIN GLYCOSYLATED A1C: CPT | Performed by: PHYSICIAN ASSISTANT

## 2019-02-13 PROCEDURE — 82306 VITAMIN D 25 HYDROXY: CPT | Performed by: PHYSICIAN ASSISTANT

## 2019-02-13 PROCEDURE — 84443 ASSAY THYROID STIM HORMONE: CPT | Performed by: PHYSICIAN ASSISTANT

## 2019-02-13 PROCEDURE — 99386 PREV VISIT NEW AGE 40-64: CPT | Performed by: PHYSICIAN ASSISTANT

## 2019-02-13 PROCEDURE — 82977 ASSAY OF GGT: CPT | Performed by: PHYSICIAN ASSISTANT

## 2019-02-13 PROCEDURE — 80061 LIPID PANEL: CPT | Performed by: PHYSICIAN ASSISTANT

## 2019-02-13 PROCEDURE — 85025 COMPLETE CBC W/AUTO DIFF WBC: CPT | Performed by: PHYSICIAN ASSISTANT

## 2019-02-13 PROCEDURE — 86803 HEPATITIS C AB TEST: CPT | Performed by: PHYSICIAN ASSISTANT

## 2019-02-13 PROCEDURE — 80053 COMPREHEN METABOLIC PANEL: CPT | Performed by: PHYSICIAN ASSISTANT

## 2019-02-13 RX ORDER — VENLAFAXINE HYDROCHLORIDE 75 MG/1
75 CAPSULE, EXTENDED RELEASE ORAL DAILY
Qty: 30 CAPSULE | Refills: 5 | Status: SHIPPED | OUTPATIENT
Start: 2019-02-13 | End: 2019-03-13 | Stop reason: SDUPTHER

## 2019-02-13 RX ORDER — CHOLECALCIFEROL (VITAMIN D3) 1250 MCG
50000 CAPSULE ORAL WEEKLY
Qty: 12 CAPSULE | Refills: 2 | Status: SHIPPED | OUTPATIENT
Start: 2019-02-13 | End: 2019-12-03 | Stop reason: SDUPTHER

## 2019-02-13 NOTE — PATIENT INSTRUCTIONS
Gastroesophageal Reflux Disease, Adult  Normally, food travels down the esophagus and stays in the stomach to be digested. However, when a person has gastroesophageal reflux disease (GERD), food and stomach acid move back up into the esophagus. When this happens, the esophagus becomes sore and inflamed. Over time, GERD can create small holes (ulcers) in the lining of the esophagus.  What are the causes?  This condition is caused by a problem with the muscle between the esophagus and the stomach (lower esophageal sphincter, or LES). Normally, the LES muscle closes after food passes through the esophagus to the stomach. When the LES is weakened or abnormal, it does not close properly, and that allows food and stomach acid to go back up into the esophagus. The LES can be weakened by certain dietary substances, medicines, and medical conditions, including:  · Tobacco use.  · Pregnancy.  · Having a hiatal hernia.  · Heavy alcohol use.  · Certain foods and beverages, such as coffee, chocolate, onions, and peppermint.    What increases the risk?  This condition is more likely to develop in:  · People who have an increased body weight.  · People who have connective tissue disorders.  · People who use NSAID medicines.    What are the signs or symptoms?  Symptoms of this condition include:  · Heartburn.  · Difficult or painful swallowing.  · The feeling of having a lump in the throat.  · A bitter taste in the mouth.  · Bad breath.  · Having a large amount of saliva.  · Having an upset or bloated stomach.  · Belching.  · Chest pain.  · Shortness of breath or wheezing.  · Ongoing (chronic) cough or a night-time cough.  · Wearing away of tooth enamel.  · Weight loss.    Different conditions can cause chest pain. Make sure to see your health care provider if you experience chest pain.  How is this diagnosed?  Your health care provider will take a medical history and perform a physical exam. To determine if you have mild or severe  GERD, your health care provider may also monitor how you respond to treatment. You may also have other tests, including:  · An endoscopy to examine your stomach and esophagus with a small camera.  · A test that measures the acidity level in your esophagus.  · A test that measures how much pressure is on your esophagus.  · A barium swallow or modified barium swallow to show the shape, size, and functioning of your esophagus.    How is this treated?  The goal of treatment is to help relieve your symptoms and to prevent complications. Treatment for this condition may vary depending on how severe your symptoms are. Your health care provider may recommend:  · Changes to your diet.  · Medicine.  · Surgery.    Follow these instructions at home:  Diet  · Follow a diet as recommended by your health care provider. This may involve avoiding foods and drinks such as:  ? Coffee and tea (with or without caffeine).  ? Drinks that contain alcohol.  ? Energy drinks and sports drinks.  ? Carbonated drinks or sodas.  ? Chocolate and cocoa.  ? Peppermint and mint flavorings.  ? Garlic and onions.  ? Horseradish.  ? Spicy and acidic foods, including peppers, chili powder, graff powder, vinegar, hot sauces, and barbecue sauce.  ? Citrus fruit juices and citrus fruits, such as oranges, natasha, and limes.  ? Tomato-based foods, such as red sauce, chili, salsa, and pizza with red sauce.  ? Fried and fatty foods, such as donuts, french fries, potato chips, and high-fat dressings.  ? High-fat meats, such as hot dogs and fatty cuts of red and white meats, such as rib eye steak, sausage, ham, and sanders.  ? High-fat dairy items, such as whole milk, butter, and cream cheese.  · Eat small, frequent meals instead of large meals.  · Avoid drinking large amounts of liquid with your meals.  · Avoid eating meals during the 2-3 hours before bedtime.  · Avoid lying down right after you eat.  · Do not exercise right after you eat.  General  instructions  · Pay attention to any changes in your symptoms.  · Take over-the-counter and prescription medicines only as told by your health care provider. Do not take aspirin, ibuprofen, or other NSAIDs unless your health care provider told you to do so.  · Do not use any tobacco products, including cigarettes, chewing tobacco, and e-cigarettes. If you need help quitting, ask your health care provider.  · Wear loose-fitting clothing. Do not wear anything tight around your waist that causes pressure on your abdomen.  · Raise (elevate) the head of your bed 6 inches (15cm).  · Try to reduce your stress, such as with yoga or meditation. If you need help reducing stress, ask your health care provider.  · If you are overweight, reduce your weight to an amount that is healthy for you. Ask your health care provider for guidance about a safe weight loss goal.  · Keep all follow-up visits as told by your health care provider. This is important.  Contact a health care provider if:  · You have new symptoms.  · You have unexplained weight loss.  · You have difficulty swallowing, or it hurts to swallow.  · You have wheezing or a persistent cough.  · Your symptoms do not improve with treatment.  · You have a hoarse voice.  Get help right away if:  · You have pain in your arms, neck, jaw, teeth, or back.  · You feel sweaty, dizzy, or light-headed.  · You have chest pain or shortness of breath.  · You vomit and your vomit looks like blood or coffee grounds.  · You faint.  · Your stool is bloody or black.  · You cannot swallow, drink, or eat.  This information is not intended to replace advice given to you by your health care provider. Make sure you discuss any questions you have with your health care provider.  Document Released: 09/27/2006 Document Revised: 05/17/2017 Document Reviewed: 04/13/2016  [employer] Patient Education © 2018 [employer]

## 2019-02-13 NOTE — PROGRESS NOTES
Patient Care Team:  Gertrude Suero PA-C as PCP - General (Physician Assistant)  Concepcion Otto MD as Referring Physician (General Surgery)  Saumya Call MD as Consulting Physician (Radiation Oncology)  Amy Santillan MD as Consulting Physician (Hematology and Oncology)     Chief complaint: Patient is in today for a physical     Samanta Mera is a 59 y.o. female who presents for her yearly physical exam.     Patient is a pleasant 60 y/o white female who presents for routine annual physical exam and to establish care.  Patient has not been seen previously at our practice.  She has not been to PCP in a few years.  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  Diagnosed in January 2018 with breast cancer and subsequently had lumpectomy of right breast, radiation and oral chemotherapy treatment.  Patient is doing well today and is followed by Dr. Santillan.  She was started on venlafaxine 75 mg QD when her hormone medications were discontinued.  She still has hot flashes and has noticed dry cough, dizziness and fatigue recently.  She denies fever, chills, shortness of breath or heartburn.  She admits to eating late at night occasionally and having post-nasal drip.         Review of Systems   Constitutional: Positive for fatigue. Negative for activity change, appetite change, chills, diaphoresis, fever, unexpected weight gain and unexpected weight loss.   HENT: Negative for congestion, dental problem, ear pain, hearing loss, nosebleeds, sinus pressure, sore throat and trouble swallowing.    Eyes: Negative for blurred vision, pain, redness and visual disturbance.   Respiratory: Negative for apnea, cough, chest tightness, shortness of breath and wheezing.    Cardiovascular: Negative for chest pain, palpitations and leg swelling.   Gastrointestinal: Positive for nausea. Negative for abdominal distention, abdominal pain, anal bleeding, blood in stool, constipation, diarrhea, vomiting, GERD and indigestion.    Endocrine: Positive for heat intolerance. Negative for cold intolerance, polydipsia, polyphagia and polyuria.   Genitourinary: Negative for urinary incontinence, decreased urine volume, difficulty urinating, dysuria, frequency, hematuria and urgency.   Musculoskeletal: Positive for joint swelling. Negative for gait problem and bursitis.   Skin: Negative for dry skin, rash, skin lesions and bruise.   Neurological: Positive for dizziness, weakness and headache. Negative for tremors, seizures, syncope, speech difficulty, light-headedness, memory problem and confusion.   Hematological: Bruises/bleeds easily.   Psychiatric/Behavioral: Negative for agitation, behavioral problems, decreased concentration, hallucinations, sleep disturbance, suicidal ideas, depressed mood and stress. The patient is not nervous/anxious.         History  Past Medical History:   Diagnosis Date   • Malignant neoplasm of right breast in female, estrogen receptor positive (CMS/[facility]) 3/7/2018      Past Surgical History:   Procedure Laterality Date   • BREAST BIOPSY     • BREAST LUMPECTOMY     •  SECTION      X 3   • HYSTERECTOMY      age 55, full   • OOPHORECTOMY        Allergies   Allergen Reactions   • No Known Drug Allergy       Family History   Problem Relation Age of Onset   • Cancer Father    • Cancer Mother    • Diabetes Daughter    • Breast cancer Neg Hx    • Ovarian cancer Neg Hx       Social History     Socioeconomic History   • Marital status:      Spouse name: Not on file   • Number of children: Not on file   • Years of education: Not on file   • Highest education level: Not on file   Social Needs   • Financial resource strain: Not on file   • Food insecurity - worry: Not on file   • Food insecurity - inability: Not on file   • Transportation needs - medical: Not on file   • Transportation needs - non-medical: Not on file   Occupational History   • Not on file   Tobacco Use   • Smoking status: Never Smoker   • Smokeless  tobacco: Never Used   Substance and Sexual Activity   • Alcohol use: Yes     Alcohol/week: 0.6 - 1.2 oz     Types: 1 - 2 Standard drinks or equivalent per week     Comment: SOCIALLY   • Drug use: No   • Sexual activity: Not on file   Other Topics Concern   • Not on file   Social History Narrative   • Not on file        Current Outpatient Medications:   •  anastrozole (ARIMIDEX) 1 MG tablet, Take 1 tablet by mouth Daily., Disp: 30 tablet, Rfl: 5  •  venlafaxine XR (EFFEXOR-XR) 75 MG 24 hr capsule, Take 1 capsule by mouth Daily with food, Disp: 30 capsule, Rfl: 5  •  vitamin E (vitamin E) 1000 UNIT capsule, Verbally called in vaginal cream compound with equal parts vit E and coconut oil. 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      Diabetes:  No   Eye Exam: N/A   Foot Exam:  N/A  Obesity Counseling:  N/A  No results found for: HGBA1C, MICROALBUR    Patient's Body mass index is 24.11 kg/m². BMI is within normal parameters. No follow-up required..      Colorectal Screening:  Ordered Today  Pap:  N/A  Mammogram:  Complete  PSA(Over age 50):  N/A  US Aorta (For male smokers, age 65):  N/A  CT for Smoker (Age 55-75, 30pk yr):  N/A  Bone Density/DEXA:  Ordered Today  Hep C ( 4970-1745):  Ordered Today      Results for  orders placed or performed in visit on 11/07/18   Comprehensive Metabolic Panel   Result Value Ref Range    Glucose 88 70 - 100 mg/dL    BUN 18 9 - 23 mg/dL    Creatinine 0.85 0.60 - 1.30 mg/dL    Sodium 138 132 - 146 mmol/L    Potassium 5.2 3.5 - 5.5 mmol/L    Chloride 103 99 - 109 mmol/L    CO2 32.0 (H) 20.0 - 31.0 mmol/L    Calcium 9.6 8.7 - 10.4 mg/dL    Total Protein 6.9 5.7 - 8.2 g/dL    Albumin 4.31 3.20 - 4.80 g/dL    ALT (SGPT) 22 7 - 40 U/L    AST (SGOT) 27 0 - 33 U/L    Alkaline Phosphatase 58 25 - 100 U/L    Total Bilirubin 0.4 0.3 - 1.2 mg/dL    eGFR Non African Amer 69 >60 mL/min/1.73    Globulin 2.6 gm/dL    A/G Ratio 1.7 1.5 - 2.5 g/dL    BUN/Creatinine Ratio 21.2 7.0 - 25.0    Anion Gap 3.0 3.0 - 11.0 mmol/L   Sedimentation Rate   Result Value Ref Range    Sed Rate 30 0 - 30 mm/hr   C-reactive Protein   Result Value Ref Range    C-Reactive Protein 0.15 0.00 - 1.00 mg/dL   CBC Auto Differential   Result Value Ref Range    WBC 5.02 3.50 - 10.80 10*3/mm3    RBC 4.64 3.89 - 5.14 10*6/mm3    Hemoglobin 13.6 11.5 - 15.5 g/dL    Hematocrit 42.5 34.5 - 44.0 %    MCV 91.6 80.0 - 99.0 fL    MCH 29.3 27.0 - 31.0 pg    MCHC 32.0 32.0 - 36.0 g/dL    RDW 12.9 11.3 - 14.5 %    RDW-SD 43.4 37.0 - 54.0 fl    MPV 11.3 6.0 - 12.0 fL    Platelets 228 150 - 450 10*3/mm3    Neutrophil % 58.5 41.0 - 71.0 %    Lymphocyte % 32.5 24.0 - 44.0 %    Monocyte % 9.0 0.0 - 12.0 %    Eosinophil % 0.0 0.0 - 3.0 %    Basophil % 0.0 0.0 - 1.0 %    Immature Grans % 0.2 0.0 - 0.6 %    Neutrophils, Absolute 2.94 1.50 - 8.30 10*3/mm3    Lymphocytes, Absolute 1.63 0.60 - 4.80 10*3/mm3    Monocytes, Absolute 0.45 0.00 - 1.00 10*3/mm3    Eosinophils, Absolute 0.00 0.00 - 0.30 10*3/mm3    Basophils, Absolute 0.00 0.00 - 0.20 10*3/mm3    Immature Grans, Absolute 0.01 0.00 - 0.03 10*3/mm3            Vitals:    02/13/19 1004   BP: 106/70   BP Location: Right arm   Patient Position: Sitting   Cuff Size: Adult   Pulse: 61   SpO2: 96%   Weight:  "67.8 kg (149 lb 6.4 oz)   Height: 167.6 cm (66\")         Physical Exam   Constitutional: She is oriented to person, place, and time. Vital signs are normal. She appears well-developed and well-nourished. She is active and cooperative. No distress. She is not overweight.  HENT:   Head: Normocephalic and atraumatic.   Right Ear: Hearing, tympanic membrane, external ear and ear canal normal.   Left Ear: Hearing, tympanic membrane, external ear and ear canal normal.   Nose: Nose normal. Right sinus exhibits no maxillary sinus tenderness and no frontal sinus tenderness. Left sinus exhibits no maxillary sinus tenderness and no frontal sinus tenderness.   Mouth/Throat: Uvula is midline, oropharynx is clear and moist and mucous membranes are normal.   Eyes: Conjunctivae, EOM and lids are normal.   Neck: Trachea normal, normal range of motion and phonation normal. No thyroid mass and no thyromegaly present.   Cardiovascular: Normal rate, regular rhythm and normal heart sounds.   Pulmonary/Chest: Effort normal and breath sounds normal.   Abdominal: Soft. Normal appearance. She exhibits no distension. There is no tenderness. There is no rigidity, no guarding and no CVA tenderness.   Musculoskeletal: Normal range of motion. She exhibits no edema.   Lymphadenopathy:     She has no cervical adenopathy.        Right cervical: No superficial cervical adenopathy present.       Left cervical: No superficial cervical adenopathy present.   Neurological: She is alert and oriented to person, place, and time. She has normal strength and normal reflexes. Coordination and gait normal.   CN grossly intact   Skin: Skin is warm and intact. No rash noted. She is not diaphoretic. No cyanosis or erythema. Nails show no clubbing.   Psychiatric: She has a normal mood and affect. Her speech is normal and behavior is normal. Judgment and thought content normal. She is not actively hallucinating. Cognition and memory are normal. She is attentive. "   Vitals reviewed.          Counseling provided on diet and nutrition, exercise, weight management and supplements.    Samanta was seen today for establish care and illness.    Diagnoses and all orders for this visit:    Encounter to establish care    Physical exam, annual  -     CBC Auto Differential; Future  -     Comprehensive Metabolic Panel; Future  -     TSH; Future  -     Hemoglobin A1c; Future  -     Lipid Panel; Future    Hot flashes due to menopause  -still having hot flashes  -will trial increasing venlafaxine and see if patient tolerates it  -     venlafaxine XR (EFFEXOR-XR) 75 MG 24 hr capsule; Take 1 capsule by mouth Daily with food    Menopause  -due for repeat bone scan with current chemotherapy medication as well  -     DEXA Bone Density Axial    Cough  -reports dry cough that occurs most often at night  -wondering if this is side effect of venlafaxine  -discussed possible GERD and allergies as cause  -if continues, would want chest x-ray as patient has history of recent breast cancer    Vitamin D deficiency  -     Vitamin D 25 Hydroxy; Future  -     Cholecalciferol (VITAMIN D3) 77418 units capsule; Take 1 capsule by mouth 1 (One) Time Per Week.    Screening for colon cancer  -     Ambulatory Referral For Screening Colonoscopy    Encounter for hepatitis C screening test for low risk patient  -     Hepatitis C Antibody; Future    Primary malignant neoplasm of upper outer quadrant of right female breast (CMS/HCC)  -followed by oncology, Dr. Santilaln  -diagnosed in January 2018  -had right lumpectomy, radiation and oral chemotherapy         Gertrude Suero PA-C   2/13/2019   10:50 AM

## 2019-02-14 DIAGNOSIS — R05.3 CHRONIC COUGH: ICD-10-CM

## 2019-02-14 DIAGNOSIS — Z17.0 MALIGNANT NEOPLASM OF RIGHT BREAST IN FEMALE, ESTROGEN RECEPTOR POSITIVE, UNSPECIFIED SITE OF BREAST (HCC): ICD-10-CM

## 2019-02-14 DIAGNOSIS — R74.8 ELEVATED LIVER ENZYMES: ICD-10-CM

## 2019-02-14 DIAGNOSIS — C50.911 MALIGNANT NEOPLASM OF RIGHT BREAST IN FEMALE, ESTROGEN RECEPTOR POSITIVE, UNSPECIFIED SITE OF BREAST (HCC): ICD-10-CM

## 2019-02-14 DIAGNOSIS — R74.01 ELEVATED TRANSAMINASE LEVEL: Primary | ICD-10-CM

## 2019-02-14 LAB — GGT SERPL-CCNC: 146 U/L (ref 0–37)

## 2019-02-15 ENCOUNTER — APPOINTMENT (OUTPATIENT)
Dept: CT IMAGING | Facility: HOSPITAL | Age: 59
End: 2019-02-15

## 2019-02-19 ENCOUNTER — HOSPITAL ENCOUNTER (OUTPATIENT)
Dept: BONE DENSITY | Facility: HOSPITAL | Age: 59
Discharge: HOME | End: 2019-02-19
Admitting: PHYSICIAN ASSISTANT

## 2019-02-19 PROCEDURE — 77080 DXA BONE DENSITY AXIAL: CPT

## 2019-02-22 ENCOUNTER — HOSPITAL ENCOUNTER (OUTPATIENT)
Dept: CT IMAGING | Facility: HOSPITAL | Age: 59
Discharge: HOME | End: 2019-02-22
Admitting: PHYSICIAN ASSISTANT

## 2019-02-22 DIAGNOSIS — C50.911 MALIGNANT NEOPLASM OF RIGHT BREAST IN FEMALE, ESTROGEN RECEPTOR POSITIVE, UNSPECIFIED SITE OF BREAST (HCC): ICD-10-CM

## 2019-02-22 DIAGNOSIS — Z17.0 MALIGNANT NEOPLASM OF RIGHT BREAST IN FEMALE, ESTROGEN RECEPTOR POSITIVE, UNSPECIFIED SITE OF BREAST (HCC): ICD-10-CM

## 2019-02-22 DIAGNOSIS — R74.8 ELEVATED LIVER ENZYMES: ICD-10-CM

## 2019-02-22 DIAGNOSIS — R05.3 CHRONIC COUGH: ICD-10-CM

## 2019-02-22 PROCEDURE — 74178 CT ABD&PLV WO CNTR FLWD CNTR: CPT

## 2019-02-22 PROCEDURE — 25010000002 IOPAMIDOL 61 % SOLUTION: Performed by: PHYSICIAN ASSISTANT

## 2019-02-22 PROCEDURE — 71270 CT THORAX DX C-/C+: CPT

## 2019-02-22 RX ADMIN — IOPAMIDOL 85 ML: 612 INJECTION, SOLUTION INTRAVENOUS at 09:55

## 2019-03-13 ENCOUNTER — OFFICE VISIT (OUTPATIENT)
Dept: FAMILY MEDICINE CLINIC | Facility: CLINIC | Age: 59
End: 2019-03-13

## 2019-03-13 ENCOUNTER — APPOINTMENT (OUTPATIENT)
Dept: LAB | Facility: HOSPITAL | Age: 59
End: 2019-03-13

## 2019-03-13 VITALS
SYSTOLIC BLOOD PRESSURE: 110 MMHG | HEART RATE: 69 BPM | DIASTOLIC BLOOD PRESSURE: 78 MMHG | WEIGHT: 150.4 LBS | BODY MASS INDEX: 24.17 KG/M2 | HEIGHT: 66 IN | OXYGEN SATURATION: 97 %

## 2019-03-13 DIAGNOSIS — J30.2 SEASONAL ALLERGIES: ICD-10-CM

## 2019-03-13 DIAGNOSIS — R74.8 ELEVATED LIVER ENZYMES: ICD-10-CM

## 2019-03-13 DIAGNOSIS — N95.1 HOT FLASHES DUE TO MENOPAUSE: Primary | ICD-10-CM

## 2019-03-13 LAB
ALBUMIN SERPL-MCNC: 4.27 G/DL (ref 3.2–4.8)
ALBUMIN/GLOB SERPL: 1.6 G/DL (ref 1.5–2.5)
ALP SERPL-CCNC: 94 U/L (ref 25–100)
ALT SERPL W P-5'-P-CCNC: 27 U/L (ref 7–40)
ANION GAP SERPL CALCULATED.3IONS-SCNC: 8 MMOL/L (ref 3–11)
AST SERPL-CCNC: 29 U/L (ref 0–33)
BILIRUB SERPL-MCNC: 0.5 MG/DL (ref 0.3–1.2)
BUN BLD-MCNC: 18 MG/DL (ref 9–23)
BUN/CREAT SERPL: 20 (ref 7–25)
CALCIUM SPEC-SCNC: 9.4 MG/DL (ref 8.7–10.4)
CHLORIDE SERPL-SCNC: 103 MMOL/L (ref 99–109)
CO2 SERPL-SCNC: 29 MMOL/L (ref 20–31)
CREAT BLD-MCNC: 0.9 MG/DL (ref 0.6–1.3)
GFR SERPL CREATININE-BSD FRML MDRD: 64 ML/MIN/1.73
GLOBULIN UR ELPH-MCNC: 2.6 GM/DL
GLUCOSE BLD-MCNC: 87 MG/DL (ref 70–100)
POTASSIUM BLD-SCNC: 4.5 MMOL/L (ref 3.5–5.5)
PROT SERPL-MCNC: 6.9 G/DL (ref 5.7–8.2)
SODIUM BLD-SCNC: 140 MMOL/L (ref 132–146)

## 2019-03-13 PROCEDURE — 80053 COMPREHEN METABOLIC PANEL: CPT | Performed by: PHYSICIAN ASSISTANT

## 2019-03-13 PROCEDURE — 99213 OFFICE O/P EST LOW 20 MIN: CPT | Performed by: PHYSICIAN ASSISTANT

## 2019-03-13 RX ORDER — VENLAFAXINE HYDROCHLORIDE 150 MG/1
CAPSULE, EXTENDED RELEASE ORAL
COMMUNITY
Start: 2019-02-28 | End: 2019-03-13

## 2019-03-13 RX ORDER — VENLAFAXINE HYDROCHLORIDE 150 MG/1
150 CAPSULE, EXTENDED RELEASE ORAL DAILY
Qty: 30 CAPSULE | Refills: 1 | Status: SHIPPED | OUTPATIENT
Start: 2019-03-13 | End: 2019-04-11

## 2019-03-13 NOTE — PATIENT INSTRUCTIONS
-recommend daily zyrtec and nasal saline lavage (like ocean spray or generic) and humidifier  -salt water gargles for tonsil stones

## 2019-03-13 NOTE — PROGRESS NOTES
Chief Complaint   Patient presents with   • Hot Flashes     4 week.       HPI     Samanta Mera is a pleasant 59 y.o. female who is here for routine follow-up of hot flashes, dry cough and elevated liver enzymes.  Patient did not receive increased dose of venlafaxine at pharmacy.  Still taking 75 mg QD and having hot flashes.  She reports feeling better overall and attributes this to vitamin D prescription.  Her recent CT abdo/pelvis and chest were unremarkable.  She has upcoming appointment with oncology in April.  She reports improvement in dry cough with only occasional coughing that she attributes to post-nasal drip and seasonal allergies.  Will repeat her liver enzymes.  She denies any abdominal pain, change in bowels, nausea/vomiting.  Reports weight gain which she attributes to arimidex.    Past Medical History:   Diagnosis Date   • Malignant neoplasm of right breast in female, estrogen receptor positive (CMS/HCC) 3/7/2018       Past Surgical History:   Procedure Laterality Date   • BREAST BIOPSY     • BREAST LUMPECTOMY     •  SECTION      X 3   • HYSTERECTOMY      age 55, full   • OOPHORECTOMY         Family History   Problem Relation Age of Onset   • Cancer Father    • Cancer Mother    • Diabetes Daughter    • Breast cancer Neg Hx    • Ovarian cancer Neg Hx        Social History     Socioeconomic History   • Marital status:      Spouse name: Not on file   • Number of children: Not on file   • Years of education: Not on file   • Highest education level: Not on file   Social Needs   • Financial resource strain: Not on file   • Food insecurity - worry: Not on file   • Food insecurity - inability: Not on file   • Transportation needs - medical: Not on file   • Transportation needs - non-medical: Not on file   Occupational History   • Not on file   Tobacco Use   • Smoking status: Never Smoker   • Smokeless tobacco: Never Used   Substance and Sexual Activity   • Alcohol use: Yes     Alcohol/week: 0.6  "- 1.2 oz     Types: 1 - 2 Standard drinks or equivalent per week     Comment: SOCIALLY   • Drug use: No   • Sexual activity: Not on file   Other Topics Concern   • Not on file   Social History Narrative   • Not on file       Allergies   Allergen Reactions   • No Known Drug Allergy        ROS    Review of Systems   Constitutional: Positive for chills. Negative for diaphoresis, fatigue and fever.   HENT: Positive for postnasal drip and rhinorrhea. Negative for congestion and sinus pressure.    Respiratory: Positive for cough (episodic dry). Negative for shortness of breath.    Gastrointestinal: Negative for abdominal pain, constipation, diarrhea, nausea, vomiting and indigestion.   Endocrine: Positive for heat intolerance.   Genitourinary: Negative for dysuria and flank pain.   Psychiatric/Behavioral: Negative for sleep disturbance, depressed mood and stress. The patient is not nervous/anxious.        Vitals:    03/13/19 1105   BP: 110/78   BP Location: Right arm   Patient Position: Sitting   Cuff Size: Adult   Pulse: 69   SpO2: 97%   Weight: 68.2 kg (150 lb 6.4 oz)   Height: 167.6 cm (66\")       Current Outpatient Medications on File Prior to Visit   Medication Sig Dispense Refill   • anastrozole (ARIMIDEX) 1 MG tablet Take 1 tablet by mouth Daily. 30 tablet 5   • Cholecalciferol (VITAMIN D3) 99873 units capsule Take 1 capsule by mouth 1 (One) Time Per Week. 12 capsule 2   • vitamin E (vitamin E) 1000 UNIT capsule Verbally called in vaginal cream compound with equal parts vit E and coconut oil. Dispense one month supply with 11 rf. 1 capsule 11   • [DISCONTINUED] venlafaxine XR (EFFEXOR-XR) 75 MG 24 hr capsule Take 1 capsule by mouth Daily with food 30 capsule 5   • [DISCONTINUED] venlafaxine XR (EFFEXOR-XR) 150 MG 24 hr capsule        No current facility-administered medications on file prior to visit.        Results for orders placed or performed in visit on 02/13/19   CBC Auto Differential   Result Value Ref Range "    WBC 6.23 3.50 - 10.80 10*3/mm3    RBC 4.27 3.89 - 5.14 10*6/mm3    Hemoglobin 12.4 11.5 - 15.5 g/dL    Hematocrit 39.1 34.5 - 44.0 %    MCV 91.6 80.0 - 99.0 fL    MCH 29.0 27.0 - 31.0 pg    MCHC 31.7 (L) 32.0 - 36.0 g/dL    RDW 13.9 11.3 - 14.5 %    RDW-SD 46.0 37.0 - 54.0 fl    MPV 11.0 6.0 - 12.0 fL    Platelets 217 150 - 450 10*3/mm3    Neutrophil % 38.0 (L) 41.0 - 71.0 %    Lymphocyte % 51.8 (H) 24.0 - 44.0 %    Monocyte % 9.6 0.0 - 12.0 %    Eosinophil % 0.0 0.0 - 3.0 %    Basophil % 0.6 0.0 - 1.0 %    Immature Grans % 0.3 0.0 - 0.6 %    Neutrophils, Absolute 2.36 1.50 - 8.30 10*3/mm3    Lymphocytes, Absolute 3.23 0.60 - 4.80 10*3/mm3    Monocytes, Absolute 0.60 0.00 - 1.00 10*3/mm3    Eosinophils, Absolute 0.00 0.00 - 0.30 10*3/mm3    Basophils, Absolute 0.04 0.00 - 0.20 10*3/mm3    Immature Grans, Absolute 0.02 0.00 - 0.05 10*3/mm3   Comprehensive Metabolic Panel   Result Value Ref Range    Glucose 87 70 - 100 mg/dL    BUN 17 9 - 23 mg/dL    Creatinine 0.96 0.60 - 1.30 mg/dL    Sodium 139 132 - 146 mmol/L    Potassium 4.8 3.5 - 5.5 mmol/L    Chloride 103 99 - 109 mmol/L    CO2 31.0 20.0 - 31.0 mmol/L    Calcium 9.4 8.7 - 10.4 mg/dL    Total Protein 6.7 5.7 - 8.2 g/dL    Albumin 4.09 3.20 - 4.80 g/dL    ALT (SGPT) 54 (H) 7 - 40 U/L    AST (SGOT) 41 (H) 0 - 33 U/L    Alkaline Phosphatase 132 (H) 25 - 100 U/L    Total Bilirubin 0.6 0.3 - 1.2 mg/dL    eGFR Non African Amer 59 (L) >60 mL/min/1.73    Globulin 2.6 gm/dL    A/G Ratio 1.6 1.5 - 2.5 g/dL    BUN/Creatinine Ratio 17.7 7.0 - 25.0    Anion Gap 5.0 3.0 - 11.0 mmol/L   Vitamin D 25 Hydroxy   Result Value Ref Range    25 Hydroxy, Vitamin D 30.3 ng/ml   TSH   Result Value Ref Range    TSH 1.752 0.350 - 5.350 mIU/mL   Hemoglobin A1c   Result Value Ref Range    Hemoglobin A1C 5.30 4.80 - 5.60 %   Lipid Panel   Result Value Ref Range    Total Cholesterol 212 (H) 0 - 200 mg/dL    Triglycerides 83 0 - 150 mg/dL    HDL Cholesterol 71 (H) 40 - 60 mg/dL    LDL  Cholesterol  116 0 - 130 mg/dL   Hepatitis C Antibody   Result Value Ref Range    Hepatitis C Ab Non-Reactive Non-Reactive   Gamma GT   Result Value Ref Range     (H) 0 - 37 U/L       PE    Physical Exam   Constitutional: Vital signs are normal. She appears well-developed and well-nourished. She is active and cooperative. She does not appear ill. No distress. She is not overweight.  HENT:   Head: Normocephalic and atraumatic.   Right Ear: Hearing, tympanic membrane, external ear and ear canal normal.   Left Ear: Hearing, tympanic membrane, external ear and ear canal normal.   Mouth/Throat: Uvula is midline and oropharynx is clear and moist.   Tonsil stones   Eyes: EOM are normal.   Neck: Normal range of motion.   Cardiovascular: Normal rate, regular rhythm and normal heart sounds.   Pulmonary/Chest: Effort normal and breath sounds normal.   Musculoskeletal: Normal range of motion.   Neurological: She is alert.   Skin: Skin is warm. She is not diaphoretic. No erythema.   Psychiatric: She has a normal mood and affect. Her speech is normal and behavior is normal. Judgment and thought content normal. She is not actively hallucinating. She is attentive.       A/P    Samanta was seen today for hot flashes.    Diagnoses and all orders for this visit:    Hot flashes due to menopause  -increase to 150 mg QD  -patient to call and let me know how she is doing at this dose, can increase to 225 if needed  -     venlafaxine XR (EFFEXOR-XR) 150 MG 24 hr capsule; Take 1 capsule by mouth Daily.    Elevated liver enzymes  -recent imaging was unremarkable  -will repeat CMP  -     Comprehensive Metabolic Panel; Future    Seasonal allergies  -recommend zyrtec and nasal saline lavage       Plan of care reviewed with patient at the conclusion of today's visit. Education was provided regarding diagnosis, management and any prescribed or recommended OTC medications.  Patient verbalizes understanding of and agreement with management  plan.    Return in about 5 months (around 8/13/2019) for Recheck.     Gertrude Suero PA-C

## 2019-03-18 NOTE — THERAPY DISCHARGE NOTE
Outpatient Physical Therapy Discharge Summary         Patient Name: Samanta Mera  : 1960  MRN: [id number]    Today's Date: 3/18/2019    Visit Dx:    ICD-10-CM ICD-9-CM   1. Lymphedema of breast I89.0 457.1           OP PT Discharge Summary  Date of Discharge: 18  Reason for Discharge: Lack of progress(referred back to doctor due to continued symptoms)  Outcomes Achieved: Refer to plan of care for updates on goals achieved  Discharge Instructions/Additional Comments: patient had been provided with JACKI Tena, PT  3/18/2019

## 2019-03-19 DIAGNOSIS — Z12.11 SCREENING FOR COLON CANCER: Primary | ICD-10-CM

## 2019-03-21 DIAGNOSIS — Z12.11 SCREENING FOR COLON CANCER: ICD-10-CM

## 2019-03-21 NOTE — TELEPHONE ENCOUNTER
Talked to patient this afternoon. I will resend bowel prep to [facility] pharmacy in [place].

## 2019-03-26 ENCOUNTER — DOCUMENTATION (OUTPATIENT)
Dept: ONCOLOGY | Facility: CLINIC | Age: 59
End: 2019-03-26

## 2019-03-26 ENCOUNTER — OUTSIDE FACILITY SERVICE (OUTPATIENT)
Dept: GASTROENTEROLOGY | Facility: CLINIC | Age: 59
End: 2019-03-26

## 2019-03-26 PROCEDURE — G0121 COLON CA SCRN NOT HI RSK IND: HCPCS | Performed by: INTERNAL MEDICINE

## 2019-03-26 NOTE — PROGRESS NOTES
Discussed SCP with patient via the phone. Patient will call with any new questions or concerns. SC

## 2019-04-11 DIAGNOSIS — R23.2 HOT FLASHES: Primary | ICD-10-CM

## 2019-04-11 RX ORDER — VENLAFAXINE HYDROCHLORIDE 37.5 MG/1
37.5 CAPSULE, EXTENDED RELEASE ORAL DAILY
Qty: 30 CAPSULE | Refills: 0 | Status: SHIPPED | OUTPATIENT
Start: 2019-04-11 | End: 2019-05-13 | Stop reason: SDUPTHER

## 2019-04-12 ENCOUNTER — OFFICE VISIT (OUTPATIENT)
Dept: ONCOLOGY | Facility: CLINIC | Age: 59
End: 2019-04-12

## 2019-04-12 VITALS
DIASTOLIC BLOOD PRESSURE: 58 MMHG | OXYGEN SATURATION: 97 % | TEMPERATURE: 97.7 F | BODY MASS INDEX: 24.11 KG/M2 | RESPIRATION RATE: 16 BRPM | SYSTOLIC BLOOD PRESSURE: 123 MMHG | WEIGHT: 150 LBS | HEIGHT: 66 IN | HEART RATE: 80 BPM

## 2019-04-12 DIAGNOSIS — C50.911 MALIGNANT NEOPLASM OF RIGHT BREAST IN FEMALE, ESTROGEN RECEPTOR POSITIVE, UNSPECIFIED SITE OF BREAST (HCC): Primary | ICD-10-CM

## 2019-04-12 DIAGNOSIS — Z17.0 MALIGNANT NEOPLASM OF RIGHT BREAST IN FEMALE, ESTROGEN RECEPTOR POSITIVE, UNSPECIFIED SITE OF BREAST (HCC): Primary | ICD-10-CM

## 2019-04-12 PROBLEM — Z85.3 HISTORY OF BREAST CANCER: Status: RESOLVED | Noted: 2018-05-16 | Resolved: 2019-04-12

## 2019-04-12 PROBLEM — C50.411 PRIMARY MALIGNANT NEOPLASM OF UPPER OUTER QUADRANT OF RIGHT FEMALE BREAST: Status: RESOLVED | Noted: 2019-02-13 | Resolved: 2019-04-12

## 2019-04-12 PROCEDURE — 99213 OFFICE O/P EST LOW 20 MIN: CPT | Performed by: INTERNAL MEDICINE

## 2019-04-12 NOTE — PROGRESS NOTES
"      PROBLEM LIST:  1. [id number] (stage IA) ER+ [place]+ her2 negative invasive ductal carcinoma of the right breast  A) presented with an abnormality on screening mammogram.  She underwent lumpectomy on 2/27/18.  Pathology showed a 5 mm grade 1 IDC.  0/4 SLN involved.  B) radiation completed 4/18/18.  Anastrozole started April 2018.       Subjective     HISTORY OF PRESENT ILLNESS:   Samanta Mera returns for follow-up.   She says she is feeling pretty well.  She is still having hot flashes.  Her primary care doctor increased her Effexor dose but she actually felt worse on that.  She is currently trying to taper off of the Effexor completely.  She thinks she would rather live with the hot flashes than the side effects of that medicine.  She is having vaginal dryness.  The coconut oil and vitamin E E has helped some.  She still has pain with intercourse, but lack of desire is a major issue as well.  She hopes stopping the Effexor will help with this as well.    Past Medical History, Past Surgical History, Social History, Family History have been reviewed and are without significant changes except as mentioned.    Review of Systems   A comprehensive 14 point review of systems was performed and was negative except as mentioned.    Medications:  The current medication list was reviewed in the EMR    ALLERGIES:    Allergies   Allergen Reactions   • No Known Drug Allergy        Objective      /58 Comment: LUE  Pulse 80   Temp 97.7 °F (36.5 °C) (Temporal)   Resp 16   Ht 167.6 cm (66\")   Wt 68 kg (150 lb)   SpO2 97% Comment: RA  BMI 24.21 kg/m²      Performance Status: 0    General: well appearing female in no acute distress  Neuro: alert and oriented  HEENT: sclera anicteric, oropharynx clear  Lymphatics: no cervical, supraclavicular, or axillary adenopathy  Breast: Status post right lumpectomy.  The right breast is slightly smaller with some thickening and hyperpigmentation of the skin.  No palpable masses.  Left " breast is unremarkable  Cardiovascular: regular rate and rhythm, no murmurs  Lungs: clear to auscultation bilaterally  Abdomen: soft, nontender, nondistended.  No palpable organomegaly  Extremeties: no lower extremity edema  Skin: no rashes, lesions, bruising, or petechiae  Psych: mood and affect appropriate      Mammogram in December was category 3.  Six-month follow-up recommended.    DEXA scan February 2019 shows normal bone density.      Assessment/Plan   Samanta Mera is a 59 y.o. year old female with stage IA er+ her2 negative breast cancer.  She returns for follow-up on anastrozole.  She is tolerating it fairly well but is having hot flashes and vaginal dryness.  We discussed that if she wants to try a different treatment for the hot flashes Ditropan was recently shown in the study to be beneficial.    I recommended a water-based lubricant for intercourse.    She will need a repeat bone density scan in March 2021.              Visit time was 15 minutes, greater than 50% spent in counseling    Amy Santillan MD  [facility] Hematology [facility] Oncology    4/12/2019          CC:

## 2019-04-16 ENCOUNTER — OFFICE VISIT (OUTPATIENT)
Dept: FAMILY MEDICINE CLINIC | Facility: CLINIC | Age: 59
End: 2019-04-16

## 2019-04-16 VITALS
SYSTOLIC BLOOD PRESSURE: 114 MMHG | WEIGHT: 149 LBS | TEMPERATURE: 98.1 F | BODY MASS INDEX: 23.95 KG/M2 | OXYGEN SATURATION: 98 % | DIASTOLIC BLOOD PRESSURE: 70 MMHG | HEART RATE: 86 BPM | HEIGHT: 66 IN

## 2019-04-16 DIAGNOSIS — R68.83 CHILLS: ICD-10-CM

## 2019-04-16 DIAGNOSIS — J02.9 SORE THROAT: Primary | ICD-10-CM

## 2019-04-16 DIAGNOSIS — R52 BODY ACHES: ICD-10-CM

## 2019-04-16 LAB
EXPIRATION DATE: NORMAL
EXPIRATION DATE: NORMAL
FLUAV AG NPH QL: NEGATIVE
FLUBV AG NPH QL: NEGATIVE
INTERNAL CONTROL: NORMAL
INTERNAL CONTROL: NORMAL
Lab: NORMAL
Lab: NORMAL
S PYO AG THROAT QL: NEGATIVE

## 2019-04-16 PROCEDURE — 87804 INFLUENZA ASSAY W/OPTIC: CPT | Performed by: PHYSICIAN ASSISTANT

## 2019-04-16 PROCEDURE — 87880 STREP A ASSAY W/OPTIC: CPT | Performed by: PHYSICIAN ASSISTANT

## 2019-04-16 PROCEDURE — 99213 OFFICE O/P EST LOW 20 MIN: CPT | Performed by: PHYSICIAN ASSISTANT

## 2019-04-16 NOTE — PROGRESS NOTES
Chief Complaint   Patient presents with   • Sore Throat     started yesterday, also having some nausea and headache.   • Chills   • Generalized Body Aches       HPI      Samanta Mera is a 59 y.o. female who presents for Sore Throat (started yesterday, also having some nausea and headache.); Chills; and Generalized Body Aches    Patient presents with sore throat, nausea, headache, chills and myalgias that started yesterday.  She denies fever or cough.  Her throat is bothering her the most.  Patient is not allergic to any antibiotics.    Past Medical History:   Diagnosis Date   • Malignant neoplasm of right breast in female, estrogen receptor positive (CMS/HCC) 3/7/2018       Past Surgical History:   Procedure Laterality Date   • BREAST BIOPSY     • BREAST LUMPECTOMY     •  SECTION      X 3   • COLONOSCOPY  2019   • HYSTERECTOMY      age 55, full   • OOPHORECTOMY         Family History   Problem Relation Age of Onset   • Cancer Father    • Cancer Mother    • Diabetes Daughter    • Breast cancer Neg Hx    • Ovarian cancer Neg Hx        Social History     Socioeconomic History   • Marital status:      Spouse name: Not on file   • Number of children: Not on file   • Years of education: Not on file   • Highest education level: Not on file   Tobacco Use   • Smoking status: Never Smoker   • Smokeless tobacco: Never Used   Substance and Sexual Activity   • Alcohol use: Yes     Alcohol/week: 0.6 - 1.2 oz     Types: 1 - 2 Standard drinks or equivalent per week     Comment: SOCIALLY   • Drug use: No   • Sexual activity: Defer       Allergies   Allergen Reactions   • No Known Drug Allergy        ROS    Review of Systems   Constitutional: Positive for chills and fatigue. Negative for diaphoresis and fever.   HENT: Positive for congestion, postnasal drip, rhinorrhea, sinus pressure, sneezing and sore throat. Negative for ear pain and hearing loss.    Respiratory: Negative for cough, shortness of breath and  "wheezing.    Gastrointestinal: Positive for nausea. Negative for diarrhea and vomiting.   Neurological: Positive for headache. Negative for dizziness.       Vitals:    04/16/19 1126   BP: 114/70   BP Location: Right arm   Patient Position: Sitting   Cuff Size: Adult   Pulse: 86   Temp: 98.1 °F (36.7 °C)   TempSrc: Oral   SpO2: 98%   Weight: 67.6 kg (149 lb)   Height: 167.6 cm (66\")       Current Outpatient Medications on File Prior to Visit   Medication Sig Dispense Refill   • anastrozole (ARIMIDEX) 1 MG tablet Take 1 tablet by mouth Daily. 30 tablet 5   • Cholecalciferol (VITAMIN D3) 38462 units capsule Take 1 capsule by mouth 1 (One) Time Per Week. 12 capsule 2   • venlafaxine XR (EFFEXOR XR) 37.5 MG 24 hr capsule Take 1 capsule by mouth Daily. 30 capsule 0   • vitamin E (vitamin E) 1000 UNIT capsule Verbally called in vaginal cream compound with equal parts vit E and coconut oil. Dispense one month supply with 11 rf. 1 capsule 11   • [DISCONTINUED] Sod Picosulfate-Mag Ox-Cit Acd 10-3.5-12 MG-GM -GM/160ML solution Take 1 kit by mouth Take As Directed. Follow instructions that were mailed to your home. If you didn't receive these call [phone]. 894 mL 0     No current facility-administered medications on file prior to visit.        Results for orders placed or performed in visit on 04/16/19   POCT Influenza A/B   Result Value Ref Range    Rapid Influenza A Ag Negative Negative    Rapid Influenza B Ag Negative Negative    Internal Control Passed Passed    Lot Number [id number]     Expiration Date 3,212,021    POCT rapid strep A   Result Value Ref Range    Rapid Strep A Screen Negative Negative, VALID, INVALID, Not Performed    Internal Control Passed Passed    Lot Number [id number]     Expiration Date 12,212,021        PE    Physical Exam   Constitutional: Vital signs are normal. She appears well-developed and well-nourished. She appears lethargic. She is active. She appears ill. No distress. She is not " overweight.  HENT:   Head: Normocephalic and atraumatic.   Right Ear: Hearing, tympanic membrane, external ear and ear canal normal.   Left Ear: Hearing, tympanic membrane, external ear and ear canal normal.   Nose: Mucosal edema and rhinorrhea present. Right sinus exhibits no maxillary sinus tenderness and no frontal sinus tenderness. Left sinus exhibits no maxillary sinus tenderness and no frontal sinus tenderness.   Mouth/Throat: Uvula is midline. Posterior oropharyngeal erythema present.   Eyes: EOM are normal.   Neck: Normal range of motion.   Cardiovascular: Normal rate, regular rhythm and normal heart sounds.   Pulmonary/Chest: Effort normal and breath sounds normal.   Musculoskeletal: Normal range of motion.   Neurological: She appears lethargic.   Skin: Skin is warm. She is not diaphoretic. No erythema.   Psychiatric: She has a normal mood and affect. Her speech is normal and behavior is normal. Judgment and thought content normal. She is not actively hallucinating. She is attentive.        A/P    Samanta was seen today for sore throat, chills and generalized body aches.    Diagnoses and all orders for this visit:    Sore throat  -started yesterday, bothering her the most  -strep negative  -gave prescription for tetracaine lollipops  -     POCT rapid strep A    Body aches  Chills  -flu negative  -recommend rest, hydration and time  -call if symptoms worsen or change  -     POCT Influenza A/B         Plan of care reviewed with patient at the conclusion of today's visit. Education was provided regarding diagnosis, management and any prescribed or recommended OTC medications.  Patient verbalizes understanding of and agreement with management plan.    No Follow-up on file.     Gertrude Suero PA-C

## 2019-04-22 ENCOUNTER — OFFICE VISIT (OUTPATIENT)
Dept: FAMILY MEDICINE CLINIC | Facility: CLINIC | Age: 59
End: 2019-04-22

## 2019-04-22 ENCOUNTER — LAB (OUTPATIENT)
Dept: LAB | Facility: HOSPITAL | Age: 59
End: 2019-04-22

## 2019-04-22 VITALS
TEMPERATURE: 99 F | HEIGHT: 66 IN | SYSTOLIC BLOOD PRESSURE: 112 MMHG | BODY MASS INDEX: 24.17 KG/M2 | OXYGEN SATURATION: 96 % | DIASTOLIC BLOOD PRESSURE: 68 MMHG | HEART RATE: 86 BPM | WEIGHT: 150.4 LBS

## 2019-04-22 DIAGNOSIS — J02.9 PHARYNGITIS, UNSPECIFIED ETIOLOGY: Primary | ICD-10-CM

## 2019-04-22 DIAGNOSIS — M79.10 MYALGIA: ICD-10-CM

## 2019-04-22 DIAGNOSIS — J02.9 PHARYNGITIS, UNSPECIFIED ETIOLOGY: ICD-10-CM

## 2019-04-22 PROCEDURE — 86665 EPSTEIN-BARR CAPSID VCA: CPT | Performed by: PHYSICIAN ASSISTANT

## 2019-04-22 PROCEDURE — 99213 OFFICE O/P EST LOW 20 MIN: CPT | Performed by: PHYSICIAN ASSISTANT

## 2019-04-22 RX ORDER — AMOXICILLIN 875 MG/1
875 TABLET, COATED ORAL EVERY 12 HOURS SCHEDULED
Qty: 20 TABLET | Refills: 0 | Status: SHIPPED | OUTPATIENT
Start: 2019-04-22 | End: 2019-05-02

## 2019-04-22 NOTE — PROGRESS NOTES
Chief Complaint   Patient presents with   • Sore Throat     barely swallow, achy, headache for a couple of days.       HPI      Samanta Mera is a 59 y.o. female who presents for Sore Throat (barely swallow, achy, headache for a couple of days.)    Patient presents with ongoing sore throat that has worsened over the last week.  Has pain with swallowing.  Also reports myalgias and subjective low grade fever.  Denies difficulty swallowing or breathing.  No cough.      Past Medical History:   Diagnosis Date   • Malignant neoplasm of right breast in female, estrogen receptor positive (CMS/HCC) 3/7/2018       Past Surgical History:   Procedure Laterality Date   • BREAST BIOPSY     • BREAST LUMPECTOMY     •  SECTION      X 3   • COLONOSCOPY  2019   • HYSTERECTOMY      age 55, full   • OOPHORECTOMY         Family History   Problem Relation Age of Onset   • Cancer Father    • Cancer Mother    • Diabetes Daughter    • Breast cancer Neg Hx    • Ovarian cancer Neg Hx        Social History     Socioeconomic History   • Marital status:      Spouse name: Not on file   • Number of children: Not on file   • Years of education: Not on file   • Highest education level: Not on file   Tobacco Use   • Smoking status: Never Smoker   • Smokeless tobacco: Never Used   Substance and Sexual Activity   • Alcohol use: Yes     Alcohol/week: 0.6 - 1.2 oz     Types: 1 - 2 Standard drinks or equivalent per week     Comment: SOCIALLY   • Drug use: No   • Sexual activity: Defer       Allergies   Allergen Reactions   • No Known Drug Allergy        ROS    Review of Systems   Constitutional: Positive for fatigue and fever. Negative for chills and diaphoresis.   HENT: Positive for postnasal drip, rhinorrhea and sore throat. Negative for ear pain and trouble swallowing.    Respiratory: Negative for cough and shortness of breath.        Vitals:    19 1430   BP: 112/68   BP Location: Right arm   Patient Position: Sitting   Cuff Size:  "Adult   Pulse: 86   Temp: 99 °F (37.2 °C)   TempSrc: Oral   SpO2: 96%   Weight: 68.2 kg (150 lb 6.4 oz)   Height: 167.6 cm (66\")       Current Outpatient Medications on File Prior to Visit   Medication Sig Dispense Refill   • anastrozole (ARIMIDEX) 1 MG tablet Take 1 tablet by mouth Daily. 30 tablet 5   • Cholecalciferol (VITAMIN D3) 49683 units capsule Take 1 capsule by mouth 1 (One) Time Per Week. 12 capsule 2   • venlafaxine XR (EFFEXOR XR) 37.5 MG 24 hr capsule Take 1 capsule by mouth Daily. 30 capsule 0   • vitamin E (vitamin E) 1000 UNIT capsule Verbally called in vaginal cream compound with equal parts vit E and coconut oil. Dispense one month supply with 11 rf. 1 capsule 11     No current facility-administered medications on file prior to visit.        Results for orders placed or performed in visit on 04/16/19   POCT Influenza A/B   Result Value Ref Range    Rapid Influenza A Ag Negative Negative    Rapid Influenza B Ag Negative Negative    Internal Control Passed Passed    Lot Number [id number]     Expiration Date 3,212,021    POCT rapid strep A   Result Value Ref Range    Rapid Strep A Screen Negative Negative, VALID, INVALID, Not Performed    Internal Control Passed Passed    Lot Number [id number]     Expiration Date 12,212,021        PE    Physical Exam   Constitutional: Vital signs are normal. She appears well-developed and well-nourished. She is active and cooperative. She appears ill. No distress. She is not overweight.  HENT:   Head: Normocephalic and atraumatic.   Mouth/Throat: Oropharyngeal exudate and posterior oropharyngeal erythema present.   Eyes: EOM are normal.   Neck: Normal range of motion.   Musculoskeletal: Normal range of motion.   Neurological: She is alert.   Skin: Skin is warm. She is not diaphoretic. No erythema.   Psychiatric: She has a normal mood and affect. Her speech is normal and behavior is normal. Judgment and thought content normal. She is not actively hallucinating. She is " attentive.        A/P    Samanta was seen today for sore throat.    Diagnoses and all orders for this visit:    Pharyngitis, unspecified etiology  -presented last week with sore throat, strep was negative  -patient reports worsening sore throat since then  -exudates and erythema both present  -will order throat culture  -will treat with amoxicillin  -she denies any issues swallowing or shortness of breath  -discussed calling or going to ER if this occurs  -will check for EBV given sore throat, myalgias and subjective fever  -if symptoms persist with antibiotic and time, may need referral to ENT  -     amoxicillin (AMOXIL) 875 MG tablet; Take 1 tablet by mouth Every 12 (Twelve) Hours for 10 days.  -     Throat / Upper Respiratory Culture - Swab, Throat; Future  -     EBV Antibody VCA, IgG; Future    Myalgia  -     EBV Antibody VCA, IgG; Future         Plan of care reviewed with patient at the conclusion of today's visit. Education was provided regarding diagnosis, management and any prescribed or recommended OTC medications.  Patient verbalizes understanding of and agreement with management plan.    No Follow-up on file.     Gertrude Suero PA-C

## 2019-04-22 NOTE — PROGRESS NOTES
I have reviewed the notes, assessments, and/or procedures performed by PAULIE Sanchez, I concur with her/his documentation of Samanta Mera.

## 2019-04-23 PROCEDURE — 87081 CULTURE SCREEN ONLY: CPT | Performed by: PHYSICIAN ASSISTANT

## 2019-04-24 LAB — EBV VCA IGG SER-ACNC: >600 U/ML (ref 0–17.9)

## 2019-04-25 LAB — BACTERIA SPEC AEROBE CULT: NORMAL

## 2019-05-13 DIAGNOSIS — R23.2 HOT FLASHES: ICD-10-CM

## 2019-05-13 RX ORDER — VENLAFAXINE HYDROCHLORIDE 37.5 MG/1
37.5 CAPSULE, EXTENDED RELEASE ORAL DAILY
Qty: 30 CAPSULE | Refills: 0 | Status: SHIPPED | OUTPATIENT
Start: 2019-05-13 | End: 2019-08-12

## 2019-06-27 ENCOUNTER — TRANSCRIBE ORDERS (OUTPATIENT)
Dept: MAMMOGRAPHY | Facility: HOSPITAL | Age: 59
End: 2019-06-27

## 2019-06-27 ENCOUNTER — HOSPITAL ENCOUNTER (OUTPATIENT)
Dept: MAMMOGRAPHY | Facility: HOSPITAL | Age: 59
Discharge: HOME | End: 2019-06-27
Attending: INTERNAL MEDICINE | Admitting: OBSTETRICS & GYNECOLOGY

## 2019-06-27 DIAGNOSIS — R92.8 ABNORMAL MAMMOGRAM: ICD-10-CM

## 2019-06-27 DIAGNOSIS — R92.8 ABNORMAL MAMMOGRAM: Primary | ICD-10-CM

## 2019-06-27 PROCEDURE — 77065 DX MAMMO INCL CAD UNI: CPT | Performed by: RADIOLOGY

## 2019-06-27 PROCEDURE — 77065 DX MAMMO INCL CAD UNI: CPT

## 2019-07-01 RX ORDER — ANASTROZOLE 1 MG/1
1 TABLET ORAL DAILY
Qty: 30 TABLET | Refills: 5 | Status: SHIPPED | OUTPATIENT
Start: 2019-07-01 | End: 2020-02-08 | Stop reason: SDUPTHER

## 2019-07-03 ENCOUNTER — HOSPITAL ENCOUNTER (OUTPATIENT)
Dept: CARDIOLOGY | Facility: HOSPITAL | Age: 59
Discharge: HOME | End: 2019-07-03
Admitting: PHYSICIAN ASSISTANT

## 2019-07-03 ENCOUNTER — OFFICE VISIT (OUTPATIENT)
Dept: FAMILY MEDICINE CLINIC | Facility: CLINIC | Age: 59
End: 2019-07-03

## 2019-07-03 VITALS
BODY MASS INDEX: 24.91 KG/M2 | SYSTOLIC BLOOD PRESSURE: 110 MMHG | HEART RATE: 72 BPM | OXYGEN SATURATION: 97 % | HEIGHT: 66 IN | WEIGHT: 155 LBS | DIASTOLIC BLOOD PRESSURE: 80 MMHG

## 2019-07-03 DIAGNOSIS — M25.561 POSTERIOR KNEE PAIN, RIGHT: Primary | ICD-10-CM

## 2019-07-03 LAB
BH CV LOWER VASCULAR LEFT COMMON FEMORAL AUGMENT: NORMAL
BH CV LOWER VASCULAR LEFT COMMON FEMORAL COMPRESS: NORMAL
BH CV LOWER VASCULAR LEFT COMMON FEMORAL PHASIC: NORMAL
BH CV LOWER VASCULAR LEFT COMMON FEMORAL SPONT: NORMAL
BH CV LOWER VASCULAR LEFT SAPHENOFEMORAL JUNCTION AUGMENT: NORMAL
BH CV LOWER VASCULAR LEFT SAPHENOFEMORAL JUNCTION COMPRESS: NORMAL
BH CV LOWER VASCULAR LEFT SAPHENOFEMORAL JUNCTION PHASIC: NORMAL
BH CV LOWER VASCULAR LEFT SAPHENOFEMORAL JUNCTION SPONT: NORMAL
BH CV LOWER VASCULAR RIGHT COMMON FEMORAL AUGMENT: NORMAL
BH CV LOWER VASCULAR RIGHT COMMON FEMORAL COMPRESS: NORMAL
BH CV LOWER VASCULAR RIGHT COMMON FEMORAL PHASIC: NORMAL
BH CV LOWER VASCULAR RIGHT COMMON FEMORAL SPONT: NORMAL
BH CV LOWER VASCULAR RIGHT DISTAL FEMORAL AUGMENT: NORMAL
BH CV LOWER VASCULAR RIGHT DISTAL FEMORAL COMPRESS: NORMAL
BH CV LOWER VASCULAR RIGHT DISTAL FEMORAL PHASIC: NORMAL
BH CV LOWER VASCULAR RIGHT DISTAL FEMORAL SPONT: NORMAL
BH CV LOWER VASCULAR RIGHT GASTRONEMIUS COMPRESS: NORMAL
BH CV LOWER VASCULAR RIGHT GREATER SAPH AK COMPRESS: NORMAL
BH CV LOWER VASCULAR RIGHT GREATER SAPH BK COMPRESS: NORMAL
BH CV LOWER VASCULAR RIGHT LESSER SAPH COMPRESS: NORMAL
BH CV LOWER VASCULAR RIGHT MID FEMORAL AUGMENT: NORMAL
BH CV LOWER VASCULAR RIGHT MID FEMORAL COMPRESS: NORMAL
BH CV LOWER VASCULAR RIGHT MID FEMORAL PHASIC: NORMAL
BH CV LOWER VASCULAR RIGHT MID FEMORAL SPONT: NORMAL
BH CV LOWER VASCULAR RIGHT PERONEAL COMPRESS: NORMAL
BH CV LOWER VASCULAR RIGHT POPLITEAL AUGMENT: NORMAL
BH CV LOWER VASCULAR RIGHT POPLITEAL COMPRESS: NORMAL
BH CV LOWER VASCULAR RIGHT POPLITEAL PHASIC: NORMAL
BH CV LOWER VASCULAR RIGHT POPLITEAL SPONT: NORMAL
BH CV LOWER VASCULAR RIGHT POSTERIOR TIBIAL COMPRESS: NORMAL
BH CV LOWER VASCULAR RIGHT PROFUNDA FEMORAL AUGMENT: NORMAL
BH CV LOWER VASCULAR RIGHT PROFUNDA FEMORAL COMPRESS: NORMAL
BH CV LOWER VASCULAR RIGHT PROFUNDA FEMORAL PHASIC: NORMAL
BH CV LOWER VASCULAR RIGHT PROFUNDA FEMORAL SPONT: NORMAL
BH CV LOWER VASCULAR RIGHT PROXIMAL FEMORAL AUGMENT: NORMAL
BH CV LOWER VASCULAR RIGHT PROXIMAL FEMORAL COMPRESS: NORMAL
BH CV LOWER VASCULAR RIGHT PROXIMAL FEMORAL PHASIC: NORMAL
BH CV LOWER VASCULAR RIGHT PROXIMAL FEMORAL SPONT: NORMAL
BH CV LOWER VASCULAR RIGHT SAPHENOFEMORAL JUNCTION AUGMENT: NORMAL
BH CV LOWER VASCULAR RIGHT SAPHENOFEMORAL JUNCTION COMPRESS: NORMAL
BH CV LOWER VASCULAR RIGHT SAPHENOFEMORAL JUNCTION PHASIC: NORMAL
BH CV LOWER VASCULAR RIGHT SAPHENOFEMORAL JUNCTION SPONT: NORMAL

## 2019-07-03 PROCEDURE — 99213 OFFICE O/P EST LOW 20 MIN: CPT | Performed by: PHYSICIAN ASSISTANT

## 2019-07-03 PROCEDURE — 93971 EXTREMITY STUDY: CPT

## 2019-07-03 PROCEDURE — 93971 EXTREMITY STUDY: CPT | Performed by: INTERNAL MEDICINE

## 2019-07-03 NOTE — PROGRESS NOTES
"    Chief Complaint   Patient presents with   • Leg Pain     rt leg pain for several days, no injury noted, not hot to touch, constant throbbing        HPI     Samanta Mera is a pleasant 59 y.o. female with PMH of breast cancer on Arimidex who presents for evaluation of \"chief complaint.\" Works as RN in NICU. Complains of medial pain behind right knee for several days, constant throb, worse when up on feet for 12 hours. Not improved with Tylenol or Ibuprofen. No relieving factors. No leg swelling/travel. On anastrozole since April last year.     Past Medical History:   Diagnosis Date   • Drug therapy    • Hx of radiation therapy    • Malignant neoplasm of right breast in female, estrogen receptor positive (CMS/HCC) 3/7/2018       Past Surgical History:   Procedure Laterality Date   • BREAST BIOPSY     • BREAST LUMPECTOMY     •  SECTION      X 3   • COLONOSCOPY  2019   • HYSTERECTOMY      age 55, full   • OOPHORECTOMY         Family History   Problem Relation Age of Onset   • Cancer Father    • Cancer Mother    • Diabetes Daughter    • Breast cancer Neg Hx    • Ovarian cancer Neg Hx        Social History     Socioeconomic History   • Marital status:      Spouse name: Not on file   • Number of children: Not on file   • Years of education: Not on file   • Highest education level: Not on file   Tobacco Use   • Smoking status: Never Smoker   • Smokeless tobacco: Never Used   Substance and Sexual Activity   • Alcohol use: Yes     Alcohol/week: 0.6 - 1.2 oz     Types: 1 - 2 Standard drinks or equivalent per week     Comment: SOCIALLY   • Drug use: No   • Sexual activity: Defer       Allergies   Allergen Reactions   • No Known Drug Allergy        ROS    Review of Systems   Respiratory: Negative for shortness of breath.    Cardiovascular: Negative for chest pain and leg swelling.   Musculoskeletal: Positive for arthralgias.       Vitals:    19 1157   BP: 110/80   Pulse: 72   SpO2: 97%         Current " Outpatient Medications:   •  anastrozole (ARIMIDEX) 1 MG tablet, Take 1 tablet by mouth Daily., Disp: 30 tablet, Rfl: 5  •  Cholecalciferol (VITAMIN D3) 34313 units capsule, Take 1 capsule by mouth 1 (One) Time Per Week., Disp: 12 capsule, Rfl: 2  •  hepatitis A (HAVRIX) 1440 EL U/ML vaccine, Inject 1 mL into the appropriate muscle as directed by prescriber 1 (One) Time for 1 dose., Disp: 1 mL, Rfl: 0  •  venlafaxine XR (EFFEXOR XR) 37.5 MG 24 hr capsule, Take 1 capsule by mouth Daily., Disp: 30 capsule, Rfl: 0  •  vitamin E (vitamin E) 1000 UNIT capsule, Verbally called in vaginal cream compound with equal parts vit E and coconut oil. Dispense one month supply with 11 rf., Disp: 1 capsule, Rfl: 11    PE    Physical Exam   Constitutional: She appears well-developed and well-nourished. No distress.   Cardiovascular: Intact distal pulses.   Musculoskeletal:        Right knee: She exhibits normal range of motion, no swelling and no effusion. Tenderness (medial popliteal fullness and TTP ) found.        Left knee: She exhibits normal range of motion, no swelling and no effusion. No tenderness found.        Right lower leg: She exhibits no tenderness, no swelling and no edema.        Left lower leg: She exhibits no tenderness, no swelling and no edema.   Psychiatric: She has a normal mood and affect.   Vitals reviewed.       A/P    Problem List Items Addressed This Visit     None      Visit Diagnoses     Posterior knee pain, right    -  Primary  -Hx breast cancer on Arimidex  -Will order RLE venous duplex to r/o DVT. Discussed Baker's cyst in differential  -Advised Ibuprofen 600-800 mg tid prn pain, cold compresses, rest    Relevant Orders    Duplex Venous Lower Extremity - Right CAR          Plan of care reviewed with patient at the conclusion of today's visit. Education was provided regarding diagnosis, management and any prescribed or recommended OTC medications.  Patient verbalizes understanding of and agreement with  management plan.        PAULIE Arguello

## 2019-08-12 ENCOUNTER — LAB (OUTPATIENT)
Dept: LAB | Facility: HOSPITAL | Age: 59
End: 2019-08-12

## 2019-08-12 ENCOUNTER — OFFICE VISIT (OUTPATIENT)
Dept: FAMILY MEDICINE CLINIC | Facility: CLINIC | Age: 59
End: 2019-08-12

## 2019-08-12 VITALS
HEIGHT: 66 IN | DIASTOLIC BLOOD PRESSURE: 82 MMHG | HEART RATE: 66 BPM | BODY MASS INDEX: 24.62 KG/M2 | WEIGHT: 153.2 LBS | SYSTOLIC BLOOD PRESSURE: 122 MMHG | OXYGEN SATURATION: 99 %

## 2019-08-12 DIAGNOSIS — R45.4 IRRITABILITY: Primary | ICD-10-CM

## 2019-08-12 DIAGNOSIS — C50.911 MALIGNANT NEOPLASM OF RIGHT BREAST IN FEMALE, ESTROGEN RECEPTOR POSITIVE, UNSPECIFIED SITE OF BREAST (HCC): ICD-10-CM

## 2019-08-12 DIAGNOSIS — R74.8 ELEVATED LIVER ENZYMES: ICD-10-CM

## 2019-08-12 DIAGNOSIS — Z17.0 MALIGNANT NEOPLASM OF RIGHT BREAST IN FEMALE, ESTROGEN RECEPTOR POSITIVE, UNSPECIFIED SITE OF BREAST (HCC): ICD-10-CM

## 2019-08-12 LAB
ALBUMIN SERPL-MCNC: 4.4 G/DL (ref 3.5–5.2)
ALBUMIN/GLOB SERPL: 1.5 G/DL
ALP SERPL-CCNC: 54 U/L (ref 39–117)
ALT SERPL W P-5'-P-CCNC: 14 U/L (ref 1–33)
ANION GAP SERPL CALCULATED.3IONS-SCNC: 13.1 MMOL/L (ref 5–15)
AST SERPL-CCNC: 21 U/L (ref 1–32)
BILIRUB SERPL-MCNC: 0.4 MG/DL (ref 0.2–1.2)
BUN BLD-MCNC: 22 MG/DL (ref 6–20)
BUN/CREAT SERPL: 22 (ref 7–25)
CALCIUM SPEC-SCNC: 9.8 MG/DL (ref 8.6–10.5)
CHLORIDE SERPL-SCNC: 99 MMOL/L (ref 98–107)
CO2 SERPL-SCNC: 24.9 MMOL/L (ref 22–29)
CREAT BLD-MCNC: 1 MG/DL (ref 0.57–1)
GFR SERPL CREATININE-BSD FRML MDRD: 57 ML/MIN/1.73
GLOBULIN UR ELPH-MCNC: 2.9 GM/DL
GLUCOSE BLD-MCNC: 76 MG/DL (ref 65–99)
POTASSIUM BLD-SCNC: 4.2 MMOL/L (ref 3.5–5.2)
PROT SERPL-MCNC: 7.3 G/DL (ref 6–8.5)
SODIUM BLD-SCNC: 137 MMOL/L (ref 136–145)

## 2019-08-12 PROCEDURE — 99214 OFFICE O/P EST MOD 30 MIN: CPT | Performed by: PHYSICIAN ASSISTANT

## 2019-08-12 PROCEDURE — 80053 COMPREHEN METABOLIC PANEL: CPT

## 2019-08-12 NOTE — PATIENT INSTRUCTIONS
-take 0.5 tablet of zoloft (25 mg) daily for 7 days then increase to a full tablet  -take with food in the mornings    -call oncology about taking arimidex     -try tylenol arthritis

## 2019-08-12 NOTE — PROGRESS NOTES
Chief Complaint   Patient presents with   • Follow-up     Hot flashes       HPI     Samanta Mera is a pleasant 59 y.o. female who is here for routine follow-up of hot flashes, mood, right knee pain and breast cancer.  Patient is currently on arimidex and has been on this for almost a year.  She discontinued effexor and reports feeling better without medication.  She continues to have hot flashes and attributes this to the arimidex.  She also reports worsening arthralgias in her knees, ankles and joints.  She also attributes this to the arimidex and is going to talk to oncologist about possibly switching medication as she is having a hard time tolerating this.  She reports worsening irritability.  Hasn't tried zoloft or lexapro.  Denies any GI issues currently, mild constipation.  Overall she is doing well.    Past Medical History:   Diagnosis Date   • Drug therapy    • Hx of radiation therapy    • Malignant neoplasm of right breast in female, estrogen receptor positive (CMS/HCC) 3/7/2018       Past Surgical History:   Procedure Laterality Date   • BREAST BIOPSY     • BREAST LUMPECTOMY     •  SECTION      X 3   • COLONOSCOPY  2019   • HYSTERECTOMY      age 55, full   • OOPHORECTOMY         Family History   Problem Relation Age of Onset   • Cancer Father    • Cancer Mother    • Diabetes Daughter    • Breast cancer Neg Hx    • Ovarian cancer Neg Hx        Social History     Socioeconomic History   • Marital status:      Spouse name: Not on file   • Number of children: Not on file   • Years of education: Not on file   • Highest education level: Not on file   Tobacco Use   • Smoking status: Never Smoker   • Smokeless tobacco: Never Used   Substance and Sexual Activity   • Alcohol use: Yes     Alcohol/week: 0.6 - 1.2 oz     Types: 1 - 2 Standard drinks or equivalent per week     Comment: SOCIALLY   • Drug use: No   • Sexual activity: Defer       Allergies   Allergen Reactions   • No Known Drug Allergy   "      ROS    Review of Systems   Constitutional: Positive for fatigue. Negative for chills, diaphoresis and fever.   HENT: Negative for congestion, postnasal drip and rhinorrhea.    Respiratory: Negative for cough, shortness of breath and wheezing.    Cardiovascular: Negative for chest pain and leg swelling.   Endocrine: Positive for heat intolerance.   Musculoskeletal: Positive for arthralgias and myalgias. Negative for joint swelling.   Neurological: Negative for dizziness and headache.   Psychiatric/Behavioral: Positive for stress. Negative for self-injury, sleep disturbance, suicidal ideas and depressed mood. The patient is not nervous/anxious.        Vitals:    08/12/19 1103   BP: 122/82   BP Location: Left arm   Patient Position: Sitting   Cuff Size: Adult   Pulse: 66   SpO2: 99%   Weight: 69.5 kg (153 lb 3.2 oz)   Height: 167.6 cm (65.98\")       Current Outpatient Medications on File Prior to Visit   Medication Sig Dispense Refill   • anastrozole (ARIMIDEX) 1 MG tablet Take 1 tablet by mouth Daily. 30 tablet 5   • Cholecalciferol (VITAMIN D3) 03467 units capsule Take 1 capsule by mouth 1 (One) Time Per Week. 12 capsule 2   • [DISCONTINUED] venlafaxine XR (EFFEXOR XR) 37.5 MG 24 hr capsule Take 1 capsule by mouth Daily. 30 capsule 0   • [DISCONTINUED] vitamin E (vitamin E) 1000 UNIT capsule Verbally called in vaginal cream compound with equal parts vit E and coconut oil. Dispense one month supply with 11 rf. 1 capsule 11     No current facility-administered medications on file prior to visit.        Results for orders placed or performed in visit on 07/03/19   Duplex Venous Lower Extremity - Right CAR   Result Value Ref Range    Right Common Femoral Spont Y     Right Common Femoral Phasic Y     Right Common Femoral Augment Y     Right Common Femoral Compress C     Right Saphenofemoral Junction Spont Y     Right Saphenofemoral Junction Phasic Y     Right Saphenofemoral Junction Augment Y     Right " Saphenofemoral Junction Compress C     Right Profunda Femoral Spont Y     Right Profunda Femoral Phasic Y     Right Profunda Femoral Augment Y     Right Profunda Femoral Compress C     Right Proximal Femoral Spont Y     Right Proximal Femoral Phasic Y     Right Proximal Femoral Augment Y     Right Proximal Femoral Compress C     Right Mid Femoral Spont Y     Right Mid Femoral Phasic Y     Right Mid Femoral Augment Y     Right Mid Femoral Compress C     Right Distal Femoral Spont Y     Right Distal Femoral Phasic Y     Right Distal Femoral Augment Y     Right Distal Femoral Compress C     Right Popliteal Spont Y     Right Popliteal Phasic Y     Right Popliteal Augment Y     Right Popliteal Compress C     Right Posterior Tibial Compress C     Right Peroneal Compress C     Right GastronemiusSoleal Compress C     Right Greater Saph AK Compress C     Right Greater Saph BK Compress C     Right Lesser Saph Compress C     Left Common Femoral Spont Y     Left Common Femoral Phasic Y     Left Common Femoral Augment Y     Left Common Femoral Compress C     Left Saphenofemoral Junction Spont Y     Left Saphenofemoral Junction Phasic Y     Left Saphenofemoral Junction Augment Y     Left Saphenofemoral Junction Compress C        PE    Physical Exam   Constitutional: Vital signs are normal. She appears well-developed and well-nourished. She is active and cooperative. She does not appear ill. No distress. She is not overweight.  HENT:   Head: Normocephalic and atraumatic.   Eyes: EOM are normal.   Neck: Normal range of motion.   Cardiovascular: Normal rate, regular rhythm and normal heart sounds.   Pulmonary/Chest: Effort normal and breath sounds normal.   Musculoskeletal: Normal range of motion. She exhibits no edema.   Neurological: She is alert.   Skin: Skin is warm. She is not diaphoretic. No erythema.   Psychiatric: She has a normal mood and affect. Her speech is normal and behavior is normal. Judgment and thought content  normal. She is not actively hallucinating. She is attentive.   Vitals reviewed.      A/P    Samanta was seen today for follow-up.    Diagnoses and all orders for this visit:    Irritability  -reports worsening irritability and anxiety  -discontinued effexor and feels better without it  -hasn't tried anything else  -will start zoloft  -fu in 4 weeks  -     sertraline (ZOLOFT) 50 MG tablet; Take 1 tablet by mouth Daily.    Elevated liver enzymes  -last CMP was nml, will repeat again to ensure no issues with liver  -     Comprehensive Metabolic Panel; Future    Malignant neoplasm of right breast in female, estrogen receptor positive, unspecified site of breast (CMS/HCC)  -on arimidex  -has arthralgias and hot flashes, most likely due to medication  -patient is having difficulty taking medication daily and will discuss this with oncology, may be able to switch to another drug that is more tolerable       Plan of care reviewed with patient at the conclusion of today's visit. Education was provided regarding diagnosis, management and any prescribed or recommended OTC medications.  Patient verbalizes understanding of and agreement with management plan.    Return in about 4 weeks (around 9/9/2019) for Recheck, anxiety.     Gertrude Suero PA-C

## 2019-10-17 ENCOUNTER — OFFICE VISIT (OUTPATIENT)
Dept: ONCOLOGY | Facility: CLINIC | Age: 59
End: 2019-10-17

## 2019-10-17 VITALS
RESPIRATION RATE: 12 BRPM | OXYGEN SATURATION: 95 % | TEMPERATURE: 97.8 F | WEIGHT: 153 LBS | BODY MASS INDEX: 24.59 KG/M2 | DIASTOLIC BLOOD PRESSURE: 74 MMHG | HEIGHT: 66 IN | SYSTOLIC BLOOD PRESSURE: 141 MMHG | HEART RATE: 71 BPM

## 2019-10-17 DIAGNOSIS — Z17.0 MALIGNANT NEOPLASM OF RIGHT BREAST IN FEMALE, ESTROGEN RECEPTOR POSITIVE, UNSPECIFIED SITE OF BREAST (HCC): Primary | ICD-10-CM

## 2019-10-17 DIAGNOSIS — C50.911 MALIGNANT NEOPLASM OF RIGHT BREAST IN FEMALE, ESTROGEN RECEPTOR POSITIVE, UNSPECIFIED SITE OF BREAST (HCC): Primary | ICD-10-CM

## 2019-10-17 PROCEDURE — 99214 OFFICE O/P EST MOD 30 MIN: CPT | Performed by: NURSE PRACTITIONER

## 2019-10-17 NOTE — PROGRESS NOTES
"      PROBLEM LIST:  1. [id number] (stage IA) ER+ GA+ her2 negative invasive ductal carcinoma of the right breast  A) presented with an abnormality on screening mammogram.  She underwent lumpectomy on 2/27/18.  Pathology showed a 5 mm grade 1 IDC.  0/4 SLN involved.  B) radiation completed 4/18/18.  Anastrozole started April 2018.       Subjective     HISTORY OF PRESENT ILLNESS:   Samanta Mera returns for follow-up.  She continues on anastrozole.  She states the myalgias and arthralgias are slightly improved.  She states they are worse in the morning when she first wakes up.  She continues to have hot flashes.  She has weaned off the Effexor and feels better being off of it.  She is on Zoloft for mood irritability and feels like this is improved. She continues to have vaginal dryness.  Is working with her gynecologist on options for this.      Past Medical History, Past Surgical History, Social History, Family History have been reviewed and are without significant changes except as mentioned.    Review of Systems   A comprehensive 14 point review of systems was performed and was negative except as mentioned.    Medications:  The current medication list was reviewed in the EMR    ALLERGIES:    Allergies   Allergen Reactions   • No Known Drug Allergy        Objective      /74   Pulse 71   Temp 97.8 °F (36.6 °C) (Temporal)   Resp 12   Ht 167.6 cm (65.98\")   Wt 69.4 kg (153 lb)   SpO2 95%   BMI 24.71 kg/m²      Performance Status: 0    General: well appearing female in no acute distress  Neuro: alert and oriented  HEENT: sclera anicteric, oropharynx clear  Lymphatics: no cervical, supraclavicular, or axillary adenopathy  Breast: Status post right lumpectomy.  The right breast is slightly smaller with some thickening and hyperpigmentation of the skin.  No palpable masses.  Left breast is unremarkable  Cardiovascular: regular rate and rhythm, no murmurs  Lungs: clear to auscultation bilaterally  Abdomen: soft, " nontender, nondistended.  No palpable organomegaly  Extremeties: no lower extremity edema  Skin: no rashes, lesions, bruising, or petechiae  Psych: mood and affect appropriate      Mammogram in June was category 3.  Six-month follow-up recommended. Mammogram already scheduled for 12/30/2019.     DEXA scan February 2019 shows normal bone density.      Assessment/Plan   Samanta Mera is a 59 y.o. year old female with stage IA er+ her2 negative breast cancer.  She returns for follow-up on anastrozole.  Her myalgias and arthralgias are slightly better.  We discussed other treatment options which could include possibly switching to letrozole or tamoxifen.  She is going to try to continue on the anastrozole to see if the symptoms continue to improve.  If they become unmanageable then she will contact us about switching medications.  I also reinforced that we could use Ditropan for hot flashes if needed.  At this time she does not want to start a new medication.        She will need a repeat bone density scan in March 2021.      Follow-up in 6 months for evaluation.          Visit time was 25 minutes, greater than 50% spent in counseling and discussing prognosis, diagnostic testing, evaluation, current status and symptom management.    Kim Mishra APRDALTON  [facility] [place] [facility] Hematology and Oncology    10/17/2019          CC:

## 2019-12-03 DIAGNOSIS — R45.4 IRRITABILITY: ICD-10-CM

## 2019-12-03 RX ORDER — CHOLECALCIFEROL (VITAMIN D3) 1250 MCG
50000 CAPSULE ORAL WEEKLY
Qty: 12 CAPSULE | Refills: 2 | Status: SHIPPED | OUTPATIENT
Start: 2019-12-03 | End: 2021-04-22

## 2019-12-30 ENCOUNTER — HOSPITAL ENCOUNTER (OUTPATIENT)
Dept: MAMMOGRAPHY | Facility: HOSPITAL | Age: 59
Discharge: HOME | End: 2019-12-30
Admitting: OBSTETRICS & GYNECOLOGY

## 2019-12-30 DIAGNOSIS — R92.8 ABNORMAL MAMMOGRAM: ICD-10-CM

## 2019-12-30 PROCEDURE — 77066 DX MAMMO INCL CAD BI: CPT | Performed by: RADIOLOGY

## 2019-12-30 PROCEDURE — 77062 BREAST TOMOSYNTHESIS BI: CPT | Performed by: RADIOLOGY

## 2019-12-30 PROCEDURE — 77066 DX MAMMO INCL CAD BI: CPT

## 2019-12-30 PROCEDURE — G0279 TOMOSYNTHESIS, MAMMO: HCPCS

## 2020-02-10 RX ORDER — ANASTROZOLE 1 MG/1
1 TABLET ORAL DAILY
Qty: 30 TABLET | Refills: 5 | Status: SHIPPED | OUTPATIENT
Start: 2020-02-10 | End: 2020-10-30 | Stop reason: SDUPTHER

## 2020-02-27 ENCOUNTER — OFFICE VISIT (OUTPATIENT)
Dept: FAMILY MEDICINE CLINIC | Facility: CLINIC | Age: 60
End: 2020-02-27

## 2020-02-27 VITALS
SYSTOLIC BLOOD PRESSURE: 116 MMHG | HEART RATE: 67 BPM | WEIGHT: 156.8 LBS | HEIGHT: 66 IN | DIASTOLIC BLOOD PRESSURE: 72 MMHG | OXYGEN SATURATION: 98 % | TEMPERATURE: 98.6 F | BODY MASS INDEX: 25.2 KG/M2

## 2020-02-27 DIAGNOSIS — J01.00 ACUTE NON-RECURRENT MAXILLARY SINUSITIS: Primary | ICD-10-CM

## 2020-02-27 PROCEDURE — 99213 OFFICE O/P EST LOW 20 MIN: CPT | Performed by: PHYSICIAN ASSISTANT

## 2020-02-27 RX ORDER — AMOXICILLIN 875 MG/1
875 TABLET, COATED ORAL EVERY 12 HOURS SCHEDULED
Qty: 20 TABLET | Refills: 0 | Status: SHIPPED | OUTPATIENT
Start: 2020-02-27 | End: 2020-03-08

## 2020-02-27 NOTE — PROGRESS NOTES
"Chief Complaint   Patient presents with   • Sinusitis     x3 days       HPI      Samanta Mera is a 60 y.o. female who presents for Sinusitis (x3 days).  Patient presents with headache, facial pain and nasal/head congestion that started 3 days ago.  Patient denies cough, fever, chills, sore throat, ear pain or myalgias.  No antibiotic allergies.      Past Medical History:   Diagnosis Date   • Drug therapy    • Hx of radiation therapy    • Malignant neoplasm of right breast in female, estrogen receptor positive (CMS/HCC) 3/7/2018       Past Surgical History:   Procedure Laterality Date   • BREAST BIOPSY     • BREAST LUMPECTOMY     •  SECTION      X 3   • COLONOSCOPY  2019   • HYSTERECTOMY      age 55, full   • OOPHORECTOMY         Family History   Problem Relation Age of Onset   • Cancer Father    • Cancer Mother    • Diabetes Daughter    • Breast cancer Neg Hx    • Ovarian cancer Neg Hx        Social History     Socioeconomic History   • Marital status:      Spouse name: Not on file   • Number of children: Not on file   • Years of education: Not on file   • Highest education level: Not on file   Tobacco Use   • Smoking status: Never Smoker   • Smokeless tobacco: Never Used   Substance and Sexual Activity   • Alcohol use: Yes     Alcohol/week: 1.0 - 2.0 standard drinks     Types: 1 - 2 Standard drinks or equivalent per week     Comment: SOCIALLY   • Drug use: No   • Sexual activity: Defer       Allergies   Allergen Reactions   • No Known Drug Allergy        ROS    Review of Systems   Constitutional: Positive for fatigue. Negative for chills and fever.   HENT: Positive for congestion, rhinorrhea and sinus pressure. Negative for ear pain and sore throat.    Respiratory: Negative for cough, shortness of breath and wheezing.        Vitals:    20 1059   BP: 116/72   Pulse: 67   Temp: 98.6 °F (37 °C)   SpO2: 98%   Weight: 71.1 kg (156 lb 12.8 oz)   Height: 167.6 cm (66\")     Body mass index is 25.31 " kg/m².    Current Outpatient Medications on File Prior to Visit   Medication Sig Dispense Refill   • anastrozole (ARIMIDEX) 1 MG tablet Take 1 tablet by mouth Daily. 30 tablet 5   • Cholecalciferol (VITAMIN D3) 1.25 MG (85362 UT) capsule Take 1 capsule by mouth 1 (One) Time Per Week. 12 capsule 2   • sertraline (ZOLOFT) 50 MG tablet Take 1 tablet by mouth Daily. 30 tablet 2   • Zoster Vac Recomb Adjuvanted 50 MCG/0.5ML reconstituted suspension Inject 0.5 mL into the appropriate muscle as directed by prescriber and repeat in 2-6 months. 1 each 1     No current facility-administered medications on file prior to visit.        Results for orders placed or performed in visit on 08/12/19   Comprehensive Metabolic Panel   Result Value Ref Range    Glucose 76 65 - 99 mg/dL    BUN 22 (H) 6 - 20 mg/dL    Creatinine 1.00 0.57 - 1.00 mg/dL    Sodium 137 136 - 145 mmol/L    Potassium 4.2 3.5 - 5.2 mmol/L    Chloride 99 98 - 107 mmol/L    CO2 24.9 22.0 - 29.0 mmol/L    Calcium 9.8 8.6 - 10.5 mg/dL    Total Protein 7.3 6.0 - 8.5 g/dL    Albumin 4.40 3.50 - 5.20 g/dL    ALT (SGPT) 14 1 - 33 U/L    AST (SGOT) 21 1 - 32 U/L    Alkaline Phosphatase 54 39 - 117 U/L    Total Bilirubin 0.4 0.2 - 1.2 mg/dL    eGFR Non African Amer 57 (L) >60 mL/min/1.73    Globulin 2.9 gm/dL    A/G Ratio 1.5 g/dL    BUN/Creatinine Ratio 22.0 7.0 - 25.0    Anion Gap 13.1 5.0 - 15.0 mmol/L       PE    Physical Exam   Constitutional: She is oriented to person, place, and time. Vital signs are normal. She appears well-developed and well-nourished. She appears ill. No distress. She is not overweight.  HENT:   Head: Normocephalic and atraumatic.   Right Ear: Hearing, tympanic membrane, external ear and ear canal normal.   Left Ear: Hearing, tympanic membrane, external ear and ear canal normal.   Nose: Right sinus exhibits maxillary sinus tenderness. Right sinus exhibits no frontal sinus tenderness. Left sinus exhibits maxillary sinus tenderness. Left sinus  exhibits no frontal sinus tenderness.   Mouth/Throat: Uvula is midline. Posterior oropharyngeal erythema present.   Eyes: Conjunctivae are normal.   Neck: Normal range of motion.   Cardiovascular: Normal rate, regular rhythm and normal heart sounds. Exam reveals no gallop and no friction rub.   No murmur heard.  Pulmonary/Chest: Effort normal and breath sounds normal. No respiratory distress.   Dry cough   Musculoskeletal: Normal range of motion.   Neurological: She is alert and oriented to person, place, and time.   Skin: Skin is warm. She is not diaphoretic. No erythema.   Psychiatric: She has a normal mood and affect. Her behavior is normal. Judgment and thought content normal.   Vitals reviewed.       A/P    Samanta was seen today for sinusitis.    Diagnoses and all orders for this visit:    Acute non-recurrent maxillary sinusitis  -     amoxicillin (AMOXIL) 875 MG tablet; Take 1 tablet by mouth Every 12 (Twelve) Hours for 10 days.  TTP along maxillary sinuses.  Will treat with amoxicillin.  Call if symptoms worsen or change.       Plan of care reviewed with patient at the conclusion of today's visit. Education was provided regarding diagnosis, management and any prescribed or recommended OTC medications.  Patient verbalizes understanding of and agreement with management plan.    No follow-ups on file.     Gertrude Suero PA-C

## 2020-03-25 DIAGNOSIS — R45.4 IRRITABILITY: ICD-10-CM

## 2020-04-01 ENCOUNTER — E-VISIT (OUTPATIENT)
Dept: FAMILY MEDICINE CLINIC | Facility: CLINIC | Age: 60
End: 2020-04-01

## 2020-04-01 DIAGNOSIS — R30.0 DYSURIA: Primary | ICD-10-CM

## 2020-04-01 PROCEDURE — 99213 OFFICE O/P EST LOW 20 MIN: CPT | Performed by: NURSE PRACTITIONER

## 2020-04-01 RX ORDER — NITROFURANTOIN 25; 75 MG/1; MG/1
100 CAPSULE ORAL 2 TIMES DAILY
Qty: 10 CAPSULE | Refills: 0 | Status: SHIPPED | OUTPATIENT
Start: 2020-04-01 | End: 2020-04-23

## 2020-04-01 NOTE — PROGRESS NOTES
Samanta Brnach Ede    1960  [id number]    I have reviewed the e-Visit questionnaire and patient's answers, my assessment and plan are as follows:    HPI  Urinary Problems E-Visit     Question 4/1/2020 10:24 AM EDT - Filed by Patient on 4/1/2020   What state are you submitting this questionnaire from? [place]   Do you currently have the following symptoms? No   In the last 14 days, have you had contact with anyone known to have the 2019 Novel Coronavirus or anyone being tested for the 2019 Novel Coronavirus? No   Which of the following are you experiencing? Frequent urination    Urgency urinating   Please describe pain (burning, sharp, etc), discoloration (dark yellow, red, etc), clarity (clear, cloudy, etc), odor (foul-smelling): Constant feeling of full bladder with increased frequency and urgency. Urine yellow in color, no blood. Lower back pain mainly on left side   Are you able to pass urine? Yes, I can pass urine easily.   How long have you had pain or difficulty passing urine? More  than two days but less than one week   Do you have any vaginal symptoms? No   Do you have a fever? No, I do not have a fever   Do you have any of the following? I have back pain with this illness    I have belly pain with this illness   Do you have an exaggerated sensation of the need to pass urine? Yes, the sensation is exaggerated   Do you have the urge to urinate more or less frequently than normal? More frequently   What does your urine look like? It is clear   Do you have any of the following? An unusual smell   Do you have any sores on your genitals? No   Have you had any kidney problems in the past? Yes   Within the past 3 months, have you had any surgery on your kidneys or bladder, or have you had a tube inserted to collect your urine? No, I have never had either   Have you had similar symptoms in the past? Yes, I have had similar symptoms before   For your similar symptoms in the past, did any of the following work? Pills  for urine infection   Anything else you would like to add? Have had Pyelonephritis  once before   Are you pregnant?        Current Outpatient Medications   Medication Sig Dispense Refill   • anastrozole (ARIMIDEX) 1 MG tablet Take 1 tablet by mouth Daily. 30 tablet 5   • Cholecalciferol (VITAMIN D3) 1.25 MG (37082 UT) capsule Take 1 capsule by mouth 1 (One) Time Per Week. 12 capsule 2   • nitrofurantoin, macrocrystal-monohydrate, (MACROBID) 100 MG capsule Take 1 capsule by mouth 2 (Two) Times a Day. 10 capsule 0   • sertraline (ZOLOFT) 50 MG tablet Take 1 tablet by mouth Daily. 30 tablet 2   • Zoster Vac Recomb Adjuvanted 50 MCG/0.5ML reconstituted suspension Inject 0.5 mL into the appropriate muscle as directed by prescriber and repeat in 2-6 months. 1 each 1     No current facility-administered medications for this visit.         Past Medical History:   Diagnosis Date   • Drug therapy    • Hx of radiation therapy    • Malignant neoplasm of right breast in female, estrogen receptor positive (CMS/HCC) 3/7/2018        Past Surgical History:   Procedure Laterality Date   • BREAST BIOPSY     • BREAST LUMPECTOMY     •  SECTION      X 3   • COLONOSCOPY  2019   • HYSTERECTOMY      age 55, full   • OOPHORECTOMY          Social History     Socioeconomic History   • Marital status:      Spouse name: Not on file   • Number of children: Not on file   • Years of education: Not on file   • Highest education level: Not on file   Tobacco Use   • Smoking status: Never Smoker   • Smokeless tobacco: Never Used   Substance and Sexual Activity   • Alcohol use: Yes     Alcohol/week: 1.0 - 2.0 standard drinks     Types: 1 - 2 Standard drinks or equivalent per week     Comment: SOCIALLY   • Drug use: No   • Sexual activity: Defer        Results for orders placed or performed in visit on 19   Comprehensive Metabolic Panel   Result Value Ref Range    Glucose 76 65 - 99 mg/dL    BUN 22 (H) 6 - 20 mg/dL    Creatinine  1.00 0.57 - 1.00 mg/dL    Sodium 137 136 - 145 mmol/L    Potassium 4.2 3.5 - 5.2 mmol/L    Chloride 99 98 - 107 mmol/L    CO2 24.9 22.0 - 29.0 mmol/L    Calcium 9.8 8.6 - 10.5 mg/dL    Total Protein 7.3 6.0 - 8.5 g/dL    Albumin 4.40 3.50 - 5.20 g/dL    ALT (SGPT) 14 1 - 33 U/L    AST (SGOT) 21 1 - 32 U/L    Alkaline Phosphatase 54 39 - 117 U/L    Total Bilirubin 0.4 0.2 - 1.2 mg/dL    eGFR Non African Amer 57 (L) >60 mL/min/1.73    Globulin 2.9 gm/dL    A/G Ratio 1.5 g/dL    BUN/Creatinine Ratio 22.0 7.0 - 25.0    Anion Gap 13.1 5.0 - 15.0 mmol/L        ROS  Review of Systems   See HPI    Assessment/Plan   Problem List Items Addressed This Visit     None      Visit Diagnoses     Dysuria    -  Primary    Relevant Medications    nitrofurantoin, macrocrystal-monohydrate, (MACROBID) 100 MG capsule      Patient to take medications as directed. Make sure to take all of them. Return if no improvement or worsens in 5-7 days. If concerned after hours, may go to [facility] or ER for evaluation/Treatment.Patient was encouraged to keep me informed of any acute changes, lack of improvement, or any new concerning symptoms.  If patient becomes concerned, or has any worsening symptoms, they are to report either here, urgent treatment, or emergency room. Plan of care discussed with pt. They verbalized understanding and agreement.     Any medications prescribed have been sent electronically to   [facility] - [place] [address]  Phone: [phone] Fax: [phone]        BRYAN Akins  04/01/20  4:44 PM    Time Documentation: 12 min

## 2020-04-01 NOTE — PATIENT INSTRUCTIONS
Urinary Tract Infection, Adult    A urinary tract infection (UTI) is an infection of any part of the urinary tract. The urinary tract includes the kidneys, ureters, bladder, and urethra. These organs make, store, and get rid of urine in the body.  Your health care provider may use other names to describe the infection. An upper UTI affects the ureters and kidneys (pyelonephritis). A lower UTI affects the bladder (cystitis) and urethra (urethritis).  What are the causes?  Most urinary tract infections are caused by bacteria in your genital area, around the entrance to your urinary tract (urethra). These bacteria grow and cause inflammation of your urinary tract.  What increases the risk?  You are more likely to develop this condition if:  · You have a urinary catheter that stays in place (indwelling).  · You are not able to control when you urinate or have a bowel movement (you have incontinence).  · You are female and you:  ? Use a spermicide or diaphragm for birth control.  ? Have low estrogen levels.  ? Are pregnant.  · You have certain genes that increase your risk (genetics).  · You are sexually active.  · You take antibiotic medicines.  · You have a condition that causes your flow of urine to slow down, such as:  ? An enlarged prostate, if you are male.  ? Blockage in your urethra (stricture).  ? A kidney stone.  ? A nerve condition that affects your bladder control (neurogenic bladder).  ? Not getting enough to drink, or not urinating often.  · You have certain medical conditions, such as:  ? Diabetes.  ? A weak disease-fighting system (immunesystem).  ? Sickle cell disease.  ? Gout.  ? Spinal cord injury.  What are the signs or symptoms?  Symptoms of this condition include:  · Needing to urinate right away (urgently).  · Frequent urination or passing small amounts of urine frequently.  · Pain or burning with urination.  · Blood in the urine.  · Urine that smells bad or unusual.  · Trouble urinating.  · Cloudy  urine.  · Vaginal discharge, if you are female.  · Pain in the abdomen or the lower back.  You may also have:  · Vomiting or a decreased appetite.  · Confusion.  · Irritability or tiredness.  · A fever.  · Diarrhea.  The first symptom in older adults may be confusion. In some cases, they may not have any symptoms until the infection has worsened.  How is this diagnosed?  This condition is diagnosed based on your medical history and a physical exam. You may also have other tests, including:  · Urine tests.  · Blood tests.  · Tests for sexually transmitted infections (STIs).  If you have had more than one UTI, a cystoscopy or imaging studies may be done to determine the cause of the infections.  How is this treated?  Treatment for this condition includes:  · Antibiotic medicine.  · Over-the-counter medicines to treat discomfort.  · Drinking enough water to stay hydrated.  If you have frequent infections or have other conditions such as a kidney stone, you may need to see a health care provider who specializes in the urinary tract (urologist).  In rare cases, urinary tract infections can cause sepsis. Sepsis is a life-threatening condition that occurs when the body responds to an infection. Sepsis is treated in the hospital with IV antibiotics, fluids, and other medicines.  Follow these instructions at home:    Medicines  · Take over-the-counter and prescription medicines only as told by your health care provider.  · If you were prescribed an antibiotic medicine, take it as told by your health care provider. Do not stop using the antibiotic even if you start to feel better.  General instructions  · Make sure you:  ? Empty your bladder often and completely. Do not hold urine for long periods of time.  ? Empty your bladder after sex.  ? Wipe from front to back after a bowel movement if you are female. Use each tissue one time when you wipe.  · Drink enough fluid to keep your urine pale yellow.  · Keep all follow-up  visits as told by your health care provider. This is important.  Contact a health care provider if:  · Your symptoms do not get better after 1-2 days.  · Your symptoms go away and then return.  Get help right away if you have:  · Severe pain in your back or your lower abdomen.  · A fever.  · Nausea or vomiting.  Summary  · A urinary tract infection (UTI) is an infection of any part of the urinary tract, which includes the kidneys, ureters, bladder, and urethra.  · Most urinary tract infections are caused by bacteria in your genital area, around the entrance to your urinary tract (urethra).  · Treatment for this condition often includes antibiotic medicines.  · If you were prescribed an antibiotic medicine, take it as told by your health care provider. Do not stop using the antibiotic even if you start to feel better.  · Keep all follow-up visits as told by your health care provider. This is important.  This information is not intended to replace advice given to you by your health care provider. Make sure you discuss any questions you have with your health care provider.  Document Released: 09/27/2006 Document Revised: 12/05/2019 Document Reviewed: 06/27/2019  [employer] Interactive Patient Education © 2020 [employer]

## 2020-04-23 ENCOUNTER — TELEMEDICINE (OUTPATIENT)
Dept: ONCOLOGY | Facility: CLINIC | Age: 60
End: 2020-04-23

## 2020-04-23 DIAGNOSIS — C50.911 MALIGNANT NEOPLASM OF RIGHT BREAST IN FEMALE, ESTROGEN RECEPTOR POSITIVE, UNSPECIFIED SITE OF BREAST (HCC): Primary | ICD-10-CM

## 2020-04-23 DIAGNOSIS — Z17.0 MALIGNANT NEOPLASM OF RIGHT BREAST IN FEMALE, ESTROGEN RECEPTOR POSITIVE, UNSPECIFIED SITE OF BREAST (HCC): Primary | ICD-10-CM

## 2020-04-23 PROCEDURE — 99213 OFFICE O/P EST LOW 20 MIN: CPT | Performed by: INTERNAL MEDICINE

## 2020-04-23 NOTE — PROGRESS NOTES
Visit conducted via video on [employer] curry after attempting ZOOM.  Start time 10:38.  End time 10:51.      PROBLEM LIST:  1. [id number] (stage IA) ER+ IN+ her2 negative invasive ductal carcinoma of the right breast  A) presented with an abnormality on screening mammogram.  She underwent lumpectomy on 2/27/18.  Pathology showed a 5 mm grade 1 IDC.  0/4 SLN involved.  B) radiation completed 4/18/18.  Anastrozole started April 2018.       Subjective     HISTORY OF PRESENT ILLNESS:   Samanta Mera returns for follow-up.  She is still having the joint pain but it's not as bad as when she started.  She has tried ibuprofen as well as tylenol arthritis.  It helps some.  She does a lot of walking.  The pain is worse when she has been sitting for a while but better after she starts moving.  Overall she says she is dealing with it.      Past Medical History, Past Surgical History, Social History, Family History have been reviewed and are without significant changes except as mentioned.    Review of Systems   A comprehensive 14 point review of systems was performed and was negative except as mentioned.    Medications:  The current medication list was reviewed in the EMR    ALLERGIES:    Allergies   Allergen Reactions   • No Known Drug Allergy        Objective      There were no vitals taken for this visit.     Performance Status: 0    General: well appearing female in no acute distress  Neuro: alert and oriented  HEENT: sclera anicteric, oropharynx clear  Lymphatics: No masses by inspection  Lungs: Respiratory effort appears normal  Extremeties: no edema by inspection  Skin: no rashes, lesions, bruising, or petechiae  Psych: mood and affect appropriate      Mammogram in 12/30/19 - stable, category 3.  1 yr f/u recommended.          Assessment/Plan   Samanta Mera is a 60 y.o. year old female with stage IA er+ her2 negative breast cancer.  She returns for follow-up on anastrozole.  She is doing well with no symptoms concerning  for recurrence.    Arthralgias: discussed exercising in the morning as this can often help with joint symptoms throughout the day, as well as avoiding sitting for long periods of time.    She will need a repeat bone density scan in March 2021.      Follow-up in 6 months.          Visit time was 15 minutes, greater than 50% spent in counseling and discussing prognosis, diagnostic testing, evaluation, current status and symptom management.    Amy Santillan MD  [facility] Hematology and Oncology    4/23/2020          CC:

## 2020-05-13 ENCOUNTER — TRANSCRIBE ORDERS (OUTPATIENT)
Dept: ADMINISTRATIVE | Facility: HOSPITAL | Age: 60
End: 2020-05-13

## 2020-05-13 DIAGNOSIS — Z12.31 VISIT FOR SCREENING MAMMOGRAM: Primary | ICD-10-CM

## 2020-09-16 RX ORDER — CHOLECALCIFEROL (VITAMIN D3) 1250 MCG
50000 CAPSULE ORAL WEEKLY
Qty: 12 CAPSULE | Refills: 2 | OUTPATIENT
Start: 2020-09-16

## 2020-10-23 ENCOUNTER — LAB (OUTPATIENT)
Dept: LAB | Facility: HOSPITAL | Age: 60
End: 2020-10-23

## 2020-10-23 ENCOUNTER — OFFICE VISIT (OUTPATIENT)
Dept: ONCOLOGY | Facility: CLINIC | Age: 60
End: 2020-10-23

## 2020-10-23 VITALS
BODY MASS INDEX: 24.91 KG/M2 | TEMPERATURE: 97.2 F | RESPIRATION RATE: 16 BRPM | HEART RATE: 74 BPM | WEIGHT: 155 LBS | SYSTOLIC BLOOD PRESSURE: 156 MMHG | DIASTOLIC BLOOD PRESSURE: 62 MMHG | HEIGHT: 66 IN | OXYGEN SATURATION: 95 %

## 2020-10-23 DIAGNOSIS — C50.911 MALIGNANT NEOPLASM OF RIGHT BREAST IN FEMALE, ESTROGEN RECEPTOR POSITIVE, UNSPECIFIED SITE OF BREAST (HCC): ICD-10-CM

## 2020-10-23 DIAGNOSIS — Z17.0 MALIGNANT NEOPLASM OF RIGHT BREAST IN FEMALE, ESTROGEN RECEPTOR POSITIVE, UNSPECIFIED SITE OF BREAST (HCC): ICD-10-CM

## 2020-10-23 DIAGNOSIS — Z13.820 OSTEOPOROSIS SCREENING: ICD-10-CM

## 2020-10-23 DIAGNOSIS — Z17.0 MALIGNANT NEOPLASM OF RIGHT BREAST IN FEMALE, ESTROGEN RECEPTOR POSITIVE, UNSPECIFIED SITE OF BREAST (HCC): Primary | ICD-10-CM

## 2020-10-23 DIAGNOSIS — C50.911 MALIGNANT NEOPLASM OF RIGHT BREAST IN FEMALE, ESTROGEN RECEPTOR POSITIVE, UNSPECIFIED SITE OF BREAST (HCC): Primary | ICD-10-CM

## 2020-10-23 LAB
ALBUMIN SERPL-MCNC: 4.3 G/DL (ref 3.5–5.2)
ALBUMIN/GLOB SERPL: 1.4 G/DL
ALP SERPL-CCNC: 68 U/L (ref 39–117)
ALT SERPL W P-5'-P-CCNC: 12 U/L (ref 1–33)
ANION GAP SERPL CALCULATED.3IONS-SCNC: 8 MMOL/L (ref 5–15)
AST SERPL-CCNC: 20 U/L (ref 1–32)
BILIRUB SERPL-MCNC: 0.4 MG/DL (ref 0–1.2)
BUN SERPL-MCNC: 23 MG/DL (ref 8–23)
BUN/CREAT SERPL: 25.8 (ref 7–25)
CALCIUM SPEC-SCNC: 9.7 MG/DL (ref 8.6–10.5)
CHLORIDE SERPL-SCNC: 104 MMOL/L (ref 98–107)
CO2 SERPL-SCNC: 28 MMOL/L (ref 22–29)
CREAT SERPL-MCNC: 0.89 MG/DL (ref 0.57–1)
ERYTHROCYTE [DISTWIDTH] IN BLOOD BY AUTOMATED COUNT: 14 % (ref 12.3–15.4)
GFR SERPL CREATININE-BSD FRML MDRD: 65 ML/MIN/1.73
GLOBULIN UR ELPH-MCNC: 3 GM/DL
GLUCOSE SERPL-MCNC: 93 MG/DL (ref 65–99)
HCT VFR BLD AUTO: 41.2 % (ref 34–46.6)
HGB BLD-MCNC: 13.1 G/DL (ref 12–15.9)
LYMPHOCYTES # BLD AUTO: 3.6 10*3/MM3 (ref 0.7–3.1)
LYMPHOCYTES NFR BLD AUTO: 49.1 % (ref 19.6–45.3)
MCH RBC QN AUTO: 28.8 PG (ref 26.6–33)
MCHC RBC AUTO-ENTMCNC: 31.8 G/DL (ref 31.5–35.7)
MCV RBC AUTO: 90.5 FL (ref 79–97)
MONOCYTES # BLD AUTO: 0.6 10*3/MM3 (ref 0.1–0.9)
MONOCYTES NFR BLD AUTO: 8.4 % (ref 5–12)
NEUTROPHILS NFR BLD AUTO: 3.1 10*3/MM3 (ref 1.7–7)
NEUTROPHILS NFR BLD AUTO: 42.5 % (ref 42.7–76)
PLATELET # BLD AUTO: 219 10*3/MM3 (ref 140–450)
PMV BLD AUTO: 8.8 FL (ref 6–12)
POTASSIUM SERPL-SCNC: 4.6 MMOL/L (ref 3.5–5.2)
PROT SERPL-MCNC: 7.3 G/DL (ref 6–8.5)
RBC # BLD AUTO: 4.55 10*6/MM3 (ref 3.77–5.28)
SODIUM SERPL-SCNC: 140 MMOL/L (ref 136–145)
WBC # BLD AUTO: 7.3 10*3/MM3 (ref 3.4–10.8)

## 2020-10-23 PROCEDURE — 99213 OFFICE O/P EST LOW 20 MIN: CPT | Performed by: NURSE PRACTITIONER

## 2020-10-23 PROCEDURE — 36415 COLL VENOUS BLD VENIPUNCTURE: CPT

## 2020-10-23 PROCEDURE — 85025 COMPLETE CBC W/AUTO DIFF WBC: CPT

## 2020-10-23 PROCEDURE — 80053 COMPREHEN METABOLIC PANEL: CPT

## 2020-10-23 NOTE — PROGRESS NOTES
"      PROBLEM LIST:  1. [id number] (stage IA) ER+ NH+ her2 negative invasive ductal carcinoma of the right breast  A) presented with an abnormality on screening mammogram.  She underwent lumpectomy on 2/27/18.  Pathology showed a 5 mm grade 1 IDC.  0/4 SLN involved.  B) radiation completed 4/18/18.  Anastrozole started April 2018.       Subjective     HISTORY OF PRESENT ILLNESS:   Samanta Mera returns for follow-up.  She continues on anastrozole. She continues to have joint pain.  She takes ibuprofen as well as Tylenol arthritis for the discomfort.  She is walking a lot with her job.  She denies any new complaints.  No new long bone pain, cough, dyspnea, headaches or diplopia.       Past Medical History, Past Surgical History, Social History, Family History have been reviewed and are without significant changes except as mentioned.    Review of Systems   A comprehensive 14 point review of systems was performed and was negative except as mentioned.    Medications:  The current medication list was reviewed in the EMR    ALLERGIES:    Allergies   Allergen Reactions   • No Known Drug Allergy        Objective      /62   Pulse 74   Temp 97.2 °F (36.2 °C)   Resp 16   Ht 167.6 cm (66\")   Wt 70.3 kg (155 lb)   SpO2 95%   BMI 25.02 kg/m²    Vitals:    10/23/20 1127   PainSc: 0-No pain             Performance Status: 0    General: well appearing female in no acute distress  Neuro: alert and oriented  HEENT: sclera anicteric, oropharynx clear  Lymphatics: no cervical, supraclavicular, or axillary adenopathy  Breast: Status post right lumpectomy.  The right breast is slightly smaller with some thickening and hyperpigmentation of the skin.  No palpable masses.  Left breast is unremarkable  Cardiovascular: regular rate and rhythm, no murmurs  Lungs: clear to auscultation bilaterally  Abdomen: soft, nontender, nondistended.  No palpable organomegaly  Extremeties: no lower extremity edema  Skin: no rashes, lesions, " bruising, or petechiae  Psych: mood and affect appropriate      Mammogram in December 2019 was category 3.  12 -month follow-up recommended.  Mammogram already scheduled for December 2020.    DEXA scan February 2019 shows normal bone density.      Assessment/Plan   Samanta Mera is a 60 y.o. year old female with stage IA er+ her2 negative breast cancer.  She returns for follow-up on anastrozole.  She has persistent arthralgia related to the anastrozole that is manageable. She is doing well with no symptoms concerning for recurrence.  Repeat mammogram scheduled already for 12/31/2020.       She will need a repeat bone density scan in March 2021.      Follow-up in 6 months for evaluation.           Visit time was 15 minutes, greater than 50% spent in counseling and discussing prognosis, diagnostic testing, evaluation, current status and symptom management.    BRYAN Bailey  [facility] Hematology and Oncology    10/23/2020          CC:

## 2020-11-02 RX ORDER — ANASTROZOLE 1 MG/1
1 TABLET ORAL DAILY
Qty: 30 TABLET | Refills: 11 | Status: SHIPPED | OUTPATIENT
Start: 2020-11-02 | End: 2021-10-21 | Stop reason: SDUPTHER

## 2020-12-23 ENCOUNTER — IMMUNIZATION (OUTPATIENT)
Dept: VACCINE CLINIC | Facility: HOSPITAL | Age: 60
End: 2020-12-23

## 2020-12-23 PROCEDURE — 91300 HC SARSCOV02 VAC 30MCG/0.3ML IM: CPT

## 2020-12-23 PROCEDURE — 0001A: CPT

## 2020-12-31 ENCOUNTER — APPOINTMENT (OUTPATIENT)
Dept: MAMMOGRAPHY | Facility: HOSPITAL | Age: 60
End: 2020-12-31

## 2021-01-04 ENCOUNTER — APPOINTMENT (OUTPATIENT)
Dept: BONE DENSITY | Facility: HOSPITAL | Age: 61
End: 2021-01-04

## 2021-01-07 ENCOUNTER — HOSPITAL ENCOUNTER (OUTPATIENT)
Dept: MAMMOGRAPHY | Facility: HOSPITAL | Age: 61
Discharge: HOME | End: 2021-01-07
Admitting: INTERNAL MEDICINE

## 2021-01-07 DIAGNOSIS — Z12.31 VISIT FOR SCREENING MAMMOGRAM: ICD-10-CM

## 2021-01-07 PROCEDURE — G0279 TOMOSYNTHESIS, MAMMO: HCPCS

## 2021-01-07 PROCEDURE — 77066 DX MAMMO INCL CAD BI: CPT

## 2021-01-07 PROCEDURE — 77062 BREAST TOMOSYNTHESIS BI: CPT | Performed by: RADIOLOGY

## 2021-01-07 PROCEDURE — 77066 DX MAMMO INCL CAD BI: CPT | Performed by: RADIOLOGY

## 2021-01-13 ENCOUNTER — IMMUNIZATION (OUTPATIENT)
Dept: VACCINE CLINIC | Facility: HOSPITAL | Age: 61
End: 2021-01-13

## 2021-01-13 PROCEDURE — 91300 HC SARSCOV02 VAC 30MCG/0.3ML IM: CPT

## 2021-01-13 PROCEDURE — 0002A: CPT

## 2021-01-13 PROCEDURE — 0001A: CPT

## 2021-03-31 ENCOUNTER — HOSPITAL ENCOUNTER (OUTPATIENT)
Dept: BONE DENSITY | Facility: HOSPITAL | Age: 61
Discharge: HOME | End: 2021-03-31
Admitting: NURSE PRACTITIONER

## 2021-03-31 DIAGNOSIS — Z17.0 MALIGNANT NEOPLASM OF RIGHT BREAST IN FEMALE, ESTROGEN RECEPTOR POSITIVE, UNSPECIFIED SITE OF BREAST (HCC): ICD-10-CM

## 2021-03-31 DIAGNOSIS — C50.911 MALIGNANT NEOPLASM OF RIGHT BREAST IN FEMALE, ESTROGEN RECEPTOR POSITIVE, UNSPECIFIED SITE OF BREAST (HCC): ICD-10-CM

## 2021-03-31 DIAGNOSIS — Z13.820 OSTEOPOROSIS SCREENING: ICD-10-CM

## 2021-03-31 PROCEDURE — 77080 DXA BONE DENSITY AXIAL: CPT

## 2021-04-22 ENCOUNTER — OFFICE VISIT (OUTPATIENT)
Dept: ONCOLOGY | Facility: CLINIC | Age: 61
End: 2021-04-22

## 2021-04-22 VITALS
HEIGHT: 66 IN | BODY MASS INDEX: 25.07 KG/M2 | OXYGEN SATURATION: 96 % | TEMPERATURE: 96.2 F | WEIGHT: 156 LBS | HEART RATE: 71 BPM | SYSTOLIC BLOOD PRESSURE: 131 MMHG | DIASTOLIC BLOOD PRESSURE: 71 MMHG | RESPIRATION RATE: 16 BRPM

## 2021-04-22 DIAGNOSIS — Z17.0 MALIGNANT NEOPLASM OF RIGHT BREAST IN FEMALE, ESTROGEN RECEPTOR POSITIVE, UNSPECIFIED SITE OF BREAST (HCC): Primary | ICD-10-CM

## 2021-04-22 DIAGNOSIS — C50.911 MALIGNANT NEOPLASM OF RIGHT BREAST IN FEMALE, ESTROGEN RECEPTOR POSITIVE, UNSPECIFIED SITE OF BREAST (HCC): Primary | ICD-10-CM

## 2021-04-22 PROCEDURE — 99214 OFFICE O/P EST MOD 30 MIN: CPT | Performed by: INTERNAL MEDICINE

## 2021-04-22 NOTE — PROGRESS NOTES
"      PROBLEM LIST:  1. [id number] (stage IA) ER+ MS+ her2 negative invasive ductal carcinoma of the right breast  A) presented with an abnormality on screening mammogram.  She underwent lumpectomy on 2/27/18.  Pathology showed a 5 mm grade 1 IDC.  0/4 SLN involved.  B) radiation completed 4/18/18.  Anastrozole started April 2018.       Subjective     HISTORY OF PRESENT ILLNESS:   Samanta Mera returns for follow-up.  She continues to take anastrozole.  She does have joint pain.  She takes ibuprofen which helps.  She wonders if there is anything else that she can do about the joint pain.  Some days are worse than others.  Otherwise she is doing well.        Objective      /71   Pulse 71   Temp 96.2 °F (35.7 °C) (Temporal)   Resp 16   Ht 167.6 cm (65.98\")   Wt 70.8 kg (156 lb)   SpO2 96%   BMI 25.19 kg/m²    Vitals:    04/22/21 0951   PainSc: 0-No pain             Performance Status: 0    General: well appearing female in no acute distress  Neuro: alert and oriented  HEENT: sclera anicteric, oropharynx clear  Lymphatics: no cervical, supraclavicular, or axillary adenopathy  Breast: Status post right lumpectomy.  The right breast is slightly smaller with some scar tissue in the upper outer quadrant.  No palpable masses.  Left breast is unremarkable  Cardiovascular: regular rate and rhythm, no murmurs  Lungs: clear to auscultation bilaterally  Abdomen: soft, nontender, nondistended.  No palpable organomegaly  Extremeties: no lower extremity edema  Skin: no rashes, lesions, bruising, or petechiae  Psych: mood and affect appropriate      Mammogram in Jan 2021 - category 2, repeat annual screening.    DEXA scan March 2021 showed normal bone density      Assessment/Plan   Samanta Mera is a 61 y.o. year old female with stage IA er+ her2 negative breast cancer.  She returns for follow-up on anastrozole.  She continues to have arthralgias.  We discussed that arthalgias are a common side effect of aromatase " inhibitor therapy.   We reviewed different interventions that may help including regular exercise, NSAIDs, glucosamine/chondroitin supplement, or omega 3 fatty acid supplement.  There has also been a study looking at the use of Cymbalta in this setting.  She is going to try omega-3 or glucosamine.  She will contact us if she wants to try any other medications.     Repeat screening mammogram January 2022.    She will need a repeat bone density scan in March 2021.  Plan to repeat March 2023.      Follow-up in 6 months.              Amy Santillan MD  [facility] Hematology and Oncology    4/22/2021          CC:

## 2021-10-13 ENCOUNTER — IMMUNIZATION (OUTPATIENT)
Dept: VACCINE CLINIC | Facility: HOSPITAL | Age: 61
End: 2021-10-13

## 2021-10-13 PROCEDURE — 91300 HC SARSCOV02 VAC 30MCG/0.3ML IM: CPT | Performed by: INTERNAL MEDICINE

## 2021-10-13 PROCEDURE — 0004A ADM SARSCOV2 30MCG/0.3ML BOOSTER: CPT | Performed by: INTERNAL MEDICINE

## 2021-10-21 ENCOUNTER — OFFICE VISIT (OUTPATIENT)
Dept: ONCOLOGY | Facility: CLINIC | Age: 61
End: 2021-10-21

## 2021-10-21 ENCOUNTER — LAB (OUTPATIENT)
Dept: LAB | Facility: HOSPITAL | Age: 61
End: 2021-10-21

## 2021-10-21 VITALS
HEART RATE: 57 BPM | OXYGEN SATURATION: 97 % | WEIGHT: 153.2 LBS | HEIGHT: 66 IN | BODY MASS INDEX: 24.62 KG/M2 | DIASTOLIC BLOOD PRESSURE: 88 MMHG | SYSTOLIC BLOOD PRESSURE: 149 MMHG | TEMPERATURE: 96.8 F

## 2021-10-21 DIAGNOSIS — Z17.0 MALIGNANT NEOPLASM OF RIGHT BREAST IN FEMALE, ESTROGEN RECEPTOR POSITIVE, UNSPECIFIED SITE OF BREAST (HCC): Primary | ICD-10-CM

## 2021-10-21 DIAGNOSIS — C50.911 MALIGNANT NEOPLASM OF RIGHT BREAST IN FEMALE, ESTROGEN RECEPTOR POSITIVE, UNSPECIFIED SITE OF BREAST (HCC): ICD-10-CM

## 2021-10-21 DIAGNOSIS — C50.911 MALIGNANT NEOPLASM OF RIGHT BREAST IN FEMALE, ESTROGEN RECEPTOR POSITIVE, UNSPECIFIED SITE OF BREAST (HCC): Primary | ICD-10-CM

## 2021-10-21 DIAGNOSIS — Z12.31 SCREENING MAMMOGRAM FOR BREAST CANCER: ICD-10-CM

## 2021-10-21 DIAGNOSIS — Z17.0 MALIGNANT NEOPLASM OF RIGHT BREAST IN FEMALE, ESTROGEN RECEPTOR POSITIVE, UNSPECIFIED SITE OF BREAST (HCC): ICD-10-CM

## 2021-10-21 LAB
ALBUMIN SERPL-MCNC: 4.4 G/DL (ref 3.5–5.2)
ALBUMIN/GLOB SERPL: 1.5 G/DL
ALP SERPL-CCNC: 69 U/L (ref 39–117)
ALT SERPL W P-5'-P-CCNC: 15 U/L (ref 1–33)
ANION GAP SERPL CALCULATED.3IONS-SCNC: 11 MMOL/L (ref 5–15)
AST SERPL-CCNC: 21 U/L (ref 1–32)
BILIRUB SERPL-MCNC: 0.3 MG/DL (ref 0–1.2)
BUN SERPL-MCNC: 20 MG/DL (ref 8–23)
BUN/CREAT SERPL: 23.3 (ref 7–25)
CALCIUM SPEC-SCNC: 9.6 MG/DL (ref 8.6–10.5)
CHLORIDE SERPL-SCNC: 104 MMOL/L (ref 98–107)
CO2 SERPL-SCNC: 25 MMOL/L (ref 22–29)
CREAT SERPL-MCNC: 0.86 MG/DL (ref 0.57–1)
ERYTHROCYTE [DISTWIDTH] IN BLOOD BY AUTOMATED COUNT: 13.3 % (ref 12.3–15.4)
GFR SERPL CREATININE-BSD FRML MDRD: 67 ML/MIN/1.73
GLOBULIN UR ELPH-MCNC: 3 GM/DL
GLUCOSE SERPL-MCNC: 98 MG/DL (ref 65–99)
HCT VFR BLD AUTO: 37.1 % (ref 34–46.6)
HGB BLD-MCNC: 12.6 G/DL (ref 12–15.9)
LYMPHOCYTES # BLD AUTO: 2.7 10*3/MM3 (ref 0.7–3.1)
LYMPHOCYTES NFR BLD AUTO: 48.5 % (ref 19.6–45.3)
MCH RBC QN AUTO: 30.4 PG (ref 26.6–33)
MCHC RBC AUTO-ENTMCNC: 33.9 G/DL (ref 31.5–35.7)
MCV RBC AUTO: 89.8 FL (ref 79–97)
MONOCYTES # BLD AUTO: 0.1 10*3/MM3 (ref 0.1–0.9)
MONOCYTES NFR BLD AUTO: 2.5 % (ref 5–12)
NEUTROPHILS NFR BLD AUTO: 2.7 10*3/MM3 (ref 1.7–7)
NEUTROPHILS NFR BLD AUTO: 49 % (ref 42.7–76)
PLATELET # BLD AUTO: 208 10*3/MM3 (ref 140–450)
PMV BLD AUTO: 8.9 FL (ref 6–12)
POTASSIUM SERPL-SCNC: 4.6 MMOL/L (ref 3.5–5.2)
PROT SERPL-MCNC: 7.4 G/DL (ref 6–8.5)
RBC # BLD AUTO: 4.13 10*6/MM3 (ref 3.77–5.28)
SODIUM SERPL-SCNC: 140 MMOL/L (ref 136–145)
WBC # BLD AUTO: 5.6 10*3/MM3 (ref 3.4–10.8)

## 2021-10-21 PROCEDURE — 36415 COLL VENOUS BLD VENIPUNCTURE: CPT

## 2021-10-21 PROCEDURE — 80053 COMPREHEN METABOLIC PANEL: CPT

## 2021-10-21 PROCEDURE — 85025 COMPLETE CBC W/AUTO DIFF WBC: CPT

## 2021-10-21 PROCEDURE — 99215 OFFICE O/P EST HI 40 MIN: CPT | Performed by: NURSE PRACTITIONER

## 2021-10-21 RX ORDER — ANASTROZOLE 1 MG/1
1 TABLET ORAL DAILY
Qty: 90 TABLET | Refills: 3 | Status: SHIPPED | OUTPATIENT
Start: 2021-10-21 | End: 2022-11-04 | Stop reason: SDUPTHER

## 2021-10-21 RX ORDER — SODIUM PHOSPHATE,MONO-DIBASIC 19G-7G/118
ENEMA (ML) RECTAL
COMMUNITY
End: 2022-04-20

## 2021-10-21 NOTE — PROGRESS NOTES
"      PROBLEM LIST:  1. [id number] (stage IA) ER+ PA+ her2 negative invasive ductal carcinoma of the right breast  A) presented with an abnormality on screening mammogram.  She underwent lumpectomy on 2/27/18.  Pathology showed a 5 mm grade 1 IDC.  0/4 SLN involved.  B) radiation completed 4/18/18.  Anastrozole started April 2018.       Subjective     HISTORY OF PRESENT ILLNESS:   Samanta Mera returns for follow-up.  She continues on anastrozole.  She does have frequent hot flashes and night sweats as well as arthralgias.  She started taking glucosamine/chondroitin and feels like it has helped some with the arthralgia.  She is bruising more easily on her arms.  She denies any nose or gum bleeding no melena or hematochezia.  Otherwise she is doing well.      Objective      /88   Pulse 57   Temp 96.8 °F (36 °C) (Temporal)   Ht 167.6 cm (65.98\")   Wt 69.5 kg (153 lb 3.2 oz)   SpO2 97%   BMI 24.74 kg/m²    Vitals:    10/21/21 1410   PainSc: 0-No pain             Performance Status: 0    General: well appearing female in no acute distress  Neuro: alert and oriented  HEENT: sclera anicteric, oropharynx clear  Lymphatics: no cervical, supraclavicular, or axillary adenopathy  Breast: Status post right lumpectomy.  The right breast is slightly smaller with some scar tissue in the upper outer quadrant.  No palpable masses.  Left breast with no masses, skin changes or discharge  Cardiovascular: regular rate and rhythm, no murmurs  Lungs: clear to auscultation bilaterally  Abdomen: soft, nontender, nondistended.  No palpable organomegaly  Extremeties: no lower extremity edema  Skin: no rashes, lesions, bruising, or petechiae  Psych: mood and affect appropriate      Mammogram in Jan 2021 - category 2, repeat annual screening.    DEXA scan March 2021 showed normal bone density      Assessment/Plan   Samanta Mera is a 61 y.o. year old female with stage IA er+ her2 negative breast cancer.  She returns for follow-up on " anastrozole.  She continues to have arthralgias.  Continue use of glucosamine chondroitin supplement and NSAIDs as needed.  For the hot flashes I recommend Relizen.     Easy bruising: CBC from today shows a normal platelet count of 208.  Hemoglobin is normal at 12.86.  Liver enzymes are normal from CMP.  Discussed with patient this could be related to aging and endocrine therapy.    Repeat screening bilateral mammogram due January 2022.  Order placed today.    Bone density from March 2021 showed normal bone density.  We will plan to repeat March 2023.    Follow-up in 6 months.          I spent 44 minutes caring for Samanta on this date of service. This time includes time spent by me in the following activities: preparing for the visit, reviewing tests, obtaining and/or reviewing a separately obtained history, performing a medically appropriate examination and/or evaluation, counseling and educating the patient/family/caregiver, ordering medications, tests, or procedures and documenting information in the medical record        BRYAN Bailey  [facility] Hematology and Oncology    10/21/2021          CC:

## 2022-01-10 ENCOUNTER — HOSPITAL ENCOUNTER (OUTPATIENT)
Dept: MAMMOGRAPHY | Facility: HOSPITAL | Age: 62
Discharge: HOME | End: 2022-01-10
Admitting: NURSE PRACTITIONER

## 2022-01-10 DIAGNOSIS — Z12.31 SCREENING MAMMOGRAM FOR BREAST CANCER: ICD-10-CM

## 2022-01-10 DIAGNOSIS — C50.911 MALIGNANT NEOPLASM OF RIGHT BREAST IN FEMALE, ESTROGEN RECEPTOR POSITIVE, UNSPECIFIED SITE OF BREAST: ICD-10-CM

## 2022-01-10 DIAGNOSIS — Z17.0 MALIGNANT NEOPLASM OF RIGHT BREAST IN FEMALE, ESTROGEN RECEPTOR POSITIVE, UNSPECIFIED SITE OF BREAST: ICD-10-CM

## 2022-01-10 PROCEDURE — 77063 BREAST TOMOSYNTHESIS BI: CPT | Performed by: RADIOLOGY

## 2022-01-10 PROCEDURE — 77067 SCR MAMMO BI INCL CAD: CPT

## 2022-01-10 PROCEDURE — 77063 BREAST TOMOSYNTHESIS BI: CPT

## 2022-01-10 PROCEDURE — 77067 SCR MAMMO BI INCL CAD: CPT | Performed by: RADIOLOGY

## 2022-04-20 ENCOUNTER — OFFICE VISIT (OUTPATIENT)
Dept: ONCOLOGY | Facility: CLINIC | Age: 62
End: 2022-04-20

## 2022-04-20 ENCOUNTER — LAB (OUTPATIENT)
Dept: LAB | Facility: HOSPITAL | Age: 62
End: 2022-04-20

## 2022-04-20 VITALS
BODY MASS INDEX: 25.23 KG/M2 | RESPIRATION RATE: 16 BRPM | HEART RATE: 57 BPM | DIASTOLIC BLOOD PRESSURE: 79 MMHG | TEMPERATURE: 97.5 F | HEIGHT: 66 IN | OXYGEN SATURATION: 98 % | SYSTOLIC BLOOD PRESSURE: 168 MMHG | WEIGHT: 157 LBS

## 2022-04-20 DIAGNOSIS — Z17.0 MALIGNANT NEOPLASM OF RIGHT BREAST IN FEMALE, ESTROGEN RECEPTOR POSITIVE, UNSPECIFIED SITE OF BREAST: ICD-10-CM

## 2022-04-20 DIAGNOSIS — C50.911 MALIGNANT NEOPLASM OF RIGHT BREAST IN FEMALE, ESTROGEN RECEPTOR POSITIVE, UNSPECIFIED SITE OF BREAST: ICD-10-CM

## 2022-04-20 DIAGNOSIS — Z12.31 SCREENING MAMMOGRAM FOR BREAST CANCER: Primary | ICD-10-CM

## 2022-04-20 LAB
ALBUMIN SERPL-MCNC: 4.4 G/DL (ref 3.5–5.2)
ALBUMIN/GLOB SERPL: 1.5 G/DL
ALP SERPL-CCNC: 65 U/L (ref 39–117)
ALT SERPL W P-5'-P-CCNC: 16 U/L (ref 1–33)
ANION GAP SERPL CALCULATED.3IONS-SCNC: 9 MMOL/L (ref 5–15)
AST SERPL-CCNC: 23 U/L (ref 1–32)
BILIRUB SERPL-MCNC: 0.3 MG/DL (ref 0–1.2)
BUN SERPL-MCNC: 21 MG/DL (ref 8–23)
BUN/CREAT SERPL: 25 (ref 7–25)
CALCIUM SPEC-SCNC: 9.5 MG/DL (ref 8.6–10.5)
CHLORIDE SERPL-SCNC: 105 MMOL/L (ref 98–107)
CO2 SERPL-SCNC: 27 MMOL/L (ref 22–29)
CREAT SERPL-MCNC: 0.84 MG/DL (ref 0.57–1)
EGFRCR SERPLBLD CKD-EPI 2021: 78.7 ML/MIN/1.73
ERYTHROCYTE [DISTWIDTH] IN BLOOD BY AUTOMATED COUNT: 13.3 % (ref 12.3–15.4)
GLOBULIN UR ELPH-MCNC: 2.9 GM/DL
GLUCOSE SERPL-MCNC: 98 MG/DL (ref 65–99)
HCT VFR BLD AUTO: 40.8 % (ref 34–46.6)
HGB BLD-MCNC: 12.8 G/DL (ref 12–15.9)
LYMPHOCYTES # BLD AUTO: 3 10*3/MM3 (ref 0.7–3.1)
LYMPHOCYTES NFR BLD AUTO: 43.5 % (ref 19.6–45.3)
MCH RBC QN AUTO: 27.6 PG (ref 26.6–33)
MCHC RBC AUTO-ENTMCNC: 31.3 G/DL (ref 31.5–35.7)
MCV RBC AUTO: 88.4 FL (ref 79–97)
MONOCYTES # BLD AUTO: 0.6 10*3/MM3 (ref 0.1–0.9)
MONOCYTES NFR BLD AUTO: 8.1 % (ref 5–12)
NEUTROPHILS NFR BLD AUTO: 3.3 10*3/MM3 (ref 1.7–7)
NEUTROPHILS NFR BLD AUTO: 48.4 % (ref 42.7–76)
PLATELET # BLD AUTO: 222 10*3/MM3 (ref 140–450)
PMV BLD AUTO: 8.9 FL (ref 6–12)
POTASSIUM SERPL-SCNC: 4.4 MMOL/L (ref 3.5–5.2)
PROT SERPL-MCNC: 7.3 G/DL (ref 6–8.5)
RBC # BLD AUTO: 4.62 10*6/MM3 (ref 3.77–5.28)
SODIUM SERPL-SCNC: 141 MMOL/L (ref 136–145)
WBC NRBC COR # BLD: 6.9 10*3/MM3 (ref 3.4–10.8)

## 2022-04-20 PROCEDURE — 36415 COLL VENOUS BLD VENIPUNCTURE: CPT

## 2022-04-20 PROCEDURE — 99213 OFFICE O/P EST LOW 20 MIN: CPT | Performed by: INTERNAL MEDICINE

## 2022-04-20 PROCEDURE — 80053 COMPREHEN METABOLIC PANEL: CPT

## 2022-04-20 PROCEDURE — 85025 COMPLETE CBC W/AUTO DIFF WBC: CPT

## 2022-08-31 ENCOUNTER — OFFICE VISIT (OUTPATIENT)
Dept: FAMILY MEDICINE CLINIC | Facility: CLINIC | Age: 62
End: 2022-08-31

## 2022-08-31 VITALS
DIASTOLIC BLOOD PRESSURE: 76 MMHG | TEMPERATURE: 98.4 F | OXYGEN SATURATION: 96 % | WEIGHT: 153.4 LBS | HEART RATE: 84 BPM | BODY MASS INDEX: 24.65 KG/M2 | SYSTOLIC BLOOD PRESSURE: 120 MMHG | HEIGHT: 66 IN

## 2022-08-31 DIAGNOSIS — K64.9 HEMORRHOIDS, UNSPECIFIED HEMORRHOID TYPE: Primary | ICD-10-CM

## 2022-08-31 PROCEDURE — 99213 OFFICE O/P EST LOW 20 MIN: CPT | Performed by: PHYSICIAN ASSISTANT

## 2022-09-16 ENCOUNTER — PATIENT MESSAGE (OUTPATIENT)
Dept: FAMILY MEDICINE CLINIC | Facility: CLINIC | Age: 62
End: 2022-09-16

## 2022-09-16 NOTE — TELEPHONE ENCOUNTER
From: Samanta Mera  To: Gertrude Suero PA-C  Sent: 9/16/2022 9:17 AM EDT  Subject: podiatrist referral    Could I get a referral to a podiatrist for pain I'm having with my left foot. I've had issues before with plantar fasciitis and the doctor I saw for it is no longer practicing. Have you heard of Dr Ram?

## 2022-09-19 DIAGNOSIS — M72.2 PLANTAR FASCIITIS: Primary | ICD-10-CM

## 2022-11-04 ENCOUNTER — OFFICE VISIT (OUTPATIENT)
Dept: ONCOLOGY | Facility: CLINIC | Age: 62
End: 2022-11-04

## 2022-11-04 VITALS
DIASTOLIC BLOOD PRESSURE: 85 MMHG | RESPIRATION RATE: 16 BRPM | WEIGHT: 152 LBS | HEIGHT: 66 IN | HEART RATE: 62 BPM | BODY MASS INDEX: 24.43 KG/M2 | SYSTOLIC BLOOD PRESSURE: 136 MMHG | TEMPERATURE: 96.6 F | OXYGEN SATURATION: 98 %

## 2022-11-04 DIAGNOSIS — C50.911 MALIGNANT NEOPLASM OF RIGHT BREAST IN FEMALE, ESTROGEN RECEPTOR POSITIVE, UNSPECIFIED SITE OF BREAST: Primary | ICD-10-CM

## 2022-11-04 DIAGNOSIS — Z17.0 MALIGNANT NEOPLASM OF RIGHT BREAST IN FEMALE, ESTROGEN RECEPTOR POSITIVE, UNSPECIFIED SITE OF BREAST: Primary | ICD-10-CM

## 2022-11-04 DIAGNOSIS — Z79.811 LONG TERM CURRENT USE OF AROMATASE INHIBITOR: ICD-10-CM

## 2022-11-04 DIAGNOSIS — R07.81 RIB PAIN ON RIGHT SIDE: ICD-10-CM

## 2022-11-04 PROCEDURE — 99213 OFFICE O/P EST LOW 20 MIN: CPT | Performed by: NURSE PRACTITIONER

## 2022-11-04 RX ORDER — ANASTROZOLE 1 MG/1
1 TABLET ORAL DAILY
Qty: 90 TABLET | Refills: 1 | Status: SHIPPED | OUTPATIENT
Start: 2022-11-04

## 2022-11-04 NOTE — PROGRESS NOTES
"      PROBLEM LIST:  1. [id number] (stage IA) ER+ VA+ her2 negative invasive ductal carcinoma of the right breast  A) presented with an abnormality on screening mammogram.  She underwent lumpectomy on 2/27/18.  Pathology showed a 5 mm grade 1 IDC.  0/4 SLN involved.  B) radiation completed 4/18/18.  Anastrozole started April 2018.       Subjective     HISTORY OF PRESENT ILLNESS:   Samanta Mera returns for follow-up.  She continues on anastrozole.  She is looking forward to stopping anastrozole in April 2023.    She complains of few month history of right rib pain/tenderness that is intermittent under the right breast.  Last week she was very sore but the pain has improved this week.  She cannot correlate the pain with increased activity.        Objective      /85 Comment: LUE  Pulse 62   Temp 96.6 °F (35.9 °C) (Infrared)   Resp 16   Ht 167.6 cm (66\")   Wt 68.9 kg (152 lb)   SpO2 98% Comment: RA  BMI 24.53 kg/m²    Vitals:    11/04/22 1039   PainSc: 0-No pain             Performance Status: 0    General: well appearing female in no acute distress  Neuro: alert and oriented  HEENT: sclera anicteric, oropharynx clear  Lymphatics: no cervical, supraclavicular, or axillary adenopathy  Breast: Status post right lumpectomy.  The right breast is slightly smaller with some scar tissue around the upper outer quadrant incision, and some tenderness to light palpation, no palpable masses.  Left breast with no masses, skin changes or discharge  Cardiovascular: regular rate and rhythm, no murmurs  Lungs: clear to auscultation bilaterally  Abdomen: soft, nontender, nondistended.  No palpable organomegaly  Extremeties: no lower extremity edema  Skin: no rashes, lesions, bruising, or petechiae  Psych: mood and affect appropriate            Assessment & Plan   Samanta Mera is a 62 y.o. year old female with stage IA er+ her2 negative breast cancer.  She returns for follow-up on anastrozole.      She is doing well.  She had a " very small low-grade tumor and we will plan to stop anastrozole after 5 years, or spring 2023.    She has an appointment scheduled for bilateral mammogram January 11, 2023.    Bone density from March 2021 showed normal bone density.  We will plan to repeat March 2023.  Order placed today.    Rib pain: I will obtain a bone scan to evaluate for breast cancer metastasis.  We discussed the most likely causes costochondritis.  If the bone scan is negative I recommend she take Aleve 1 tablet twice a day for approximately 1 month to see if this helps with symptoms.    Follow-up in 6 months.                  BRYAN Bailey  [facility] Hematology and Oncology    11/4/2022          CC:

## 2022-11-15 ENCOUNTER — OFFICE VISIT (OUTPATIENT)
Dept: FAMILY MEDICINE CLINIC | Facility: CLINIC | Age: 62
End: 2022-11-15

## 2022-11-15 VITALS
BODY MASS INDEX: 23.98 KG/M2 | WEIGHT: 149.2 LBS | TEMPERATURE: 98.8 F | HEART RATE: 88 BPM | HEIGHT: 66 IN | OXYGEN SATURATION: 98 % | DIASTOLIC BLOOD PRESSURE: 72 MMHG | SYSTOLIC BLOOD PRESSURE: 118 MMHG

## 2022-11-15 DIAGNOSIS — J02.9 SORE THROAT: ICD-10-CM

## 2022-11-15 DIAGNOSIS — R50.9 FEVER, UNSPECIFIED FEVER CAUSE: Primary | ICD-10-CM

## 2022-11-15 PROBLEM — R39.15 URGENCY OF URINATION: Status: ACTIVE | Noted: 2020-03-30

## 2022-11-15 LAB
EXPIRATION DATE: NORMAL
EXPIRATION DATE: NORMAL
FLUAV AG UPPER RESP QL IA.RAPID: NOT DETECTED
FLUBV AG UPPER RESP QL IA.RAPID: NOT DETECTED
INTERNAL CONTROL: NORMAL
INTERNAL CONTROL: NORMAL
Lab: NORMAL
Lab: NORMAL
S PYO AG THROAT QL: NEGATIVE
SARS-COV-2 AG UPPER RESP QL IA.RAPID: NOT DETECTED

## 2022-11-15 PROCEDURE — 87428 SARSCOV & INF VIR A&B AG IA: CPT | Performed by: PHYSICIAN ASSISTANT

## 2022-11-15 PROCEDURE — 87880 STREP A ASSAY W/OPTIC: CPT | Performed by: PHYSICIAN ASSISTANT

## 2022-11-15 PROCEDURE — 99213 OFFICE O/P EST LOW 20 MIN: CPT | Performed by: PHYSICIAN ASSISTANT

## 2022-11-15 NOTE — PROGRESS NOTES
"Chief Complaint   Patient presents with   • Sore Throat   • Fever   • Fatigue   • Headache         Samanta Mera is a 62 y.o. female who presents for Sore Throat, Fever, Fatigue, and Headache that started 3 days ago.  No cough or shortness of breath.  Her grandchildren have been sick.      Past Medical History:   Diagnosis Date   • Drug therapy    • Hx of radiation therapy    • Malignant neoplasm of right breast in female, estrogen receptor positive (HCC) 3/7/2018       Past Surgical History:   Procedure Laterality Date   • BREAST BIOPSY     • BREAST LUMPECTOMY Right    •  SECTION      X 3   • COLONOSCOPY  2019   • HYSTERECTOMY      age 55, full   • OOPHORECTOMY         Family History   Problem Relation Age of Onset   • Cancer Father    • Cancer Mother    • Diabetes Daughter    • Breast cancer Neg Hx    • Ovarian cancer Neg Hx        Social History     Socioeconomic History   • Marital status:    Tobacco Use   • Smoking status: Never   • Smokeless tobacco: Never   Vaping Use   • Vaping Use: Never used   Substance and Sexual Activity   • Alcohol use: Yes     Alcohol/week: 1.0 - 2.0 standard drink     Types: 1 - 2 Standard drinks or equivalent per week     Comment: SOCIALLY   • Drug use: No   • Sexual activity: Defer       Allergies   Allergen Reactions   • No Known Drug Allergy        ROS    Review of Systems   Constitutional: Positive for chills, fatigue and fever.   HENT: Positive for sore throat. Negative for congestion, ear pain, rhinorrhea and sinus pressure.    Respiratory: Negative for cough, shortness of breath and wheezing.    Musculoskeletal: Positive for myalgias.   Neurological: Positive for headache. Negative for dizziness.       Vitals:    11/15/22 1059   BP: 118/72   BP Location: Left arm   Patient Position: Sitting   Cuff Size: Adult   Pulse: 88   Temp: 98.8 °F (37.1 °C)   TempSrc: Temporal   SpO2: 98%   Weight: 67.7 kg (149 lb 3.2 oz)   Height: 167.6 cm (65.98\")   PainSc:   6 "   PainLoc: Throat     Body mass index is 24.09 kg/m².    Current Outpatient Medications on File Prior to Visit   Medication Sig Dispense Refill   • anastrozole (ARIMIDEX) 1 MG tablet Take 1 tablet by mouth Daily. 90 tablet 1     No current facility-administered medications on file prior to visit.       Results for orders placed or performed in visit on 11/15/22   POCT SARS-CoV-2 Antigen KESIHA + Flu    Specimen: Swab   Result Value Ref Range    SARS Antigen Not Detected Not Detected, Presumptive Negative    Influenza A Antigen KEISHA Not Detected Not Detected    Influenza B Antigen KEISHA Not Detected Not Detected    Internal Control Passed Passed    Lot Number [id number]     Expiration Date 6/1/23    POCT rapid strep A    Specimen: Swab   Result Value Ref Range    Rapid Strep A Screen Negative Negative, VALID, INVALID, Not Performed    Internal Control Passed Passed    Lot Number [id number]     Expiration Date 5/31/24        PE    Physical Exam  Vitals reviewed.   Constitutional:       General: She is not in acute distress.     Appearance: She is well-developed and normal weight. She is ill-appearing. She is not diaphoretic.   HENT:      Head: Normocephalic and atraumatic.      Right Ear: Hearing, tympanic membrane, ear canal and external ear normal.      Left Ear: Hearing, tympanic membrane, ear canal and external ear normal.      Nose: Nose normal.      Right Sinus: No maxillary sinus tenderness or frontal sinus tenderness.      Left Sinus: No maxillary sinus tenderness or frontal sinus tenderness.      Mouth/Throat:      Pharynx: Uvula midline. Posterior oropharyngeal erythema present.   Eyes:      Conjunctiva/sclera: Conjunctivae normal.   Cardiovascular:      Rate and Rhythm: Normal rate and regular rhythm.      Heart sounds: Normal heart sounds. No murmur heard.    No friction rub. No gallop.   Pulmonary:      Effort: Pulmonary effort is normal. No respiratory distress.      Breath sounds: Normal breath sounds.    Musculoskeletal:         General: Normal range of motion.      Cervical back: Normal range of motion.   Skin:     General: Skin is warm.      Findings: No erythema.   Neurological:      Mental Status: She is alert and oriented to person, place, and time.   Psychiatric:         Behavior: Behavior normal.         Thought Content: Thought content normal.         Judgment: Judgment normal.          A/P    Diagnoses and all orders for this visit:    1. Fever, unspecified fever cause (Primary)  -     POCT SARS-CoV-2 Antigen KEISHA + Flu    2. Sore throat  -     POCT rapid strep A    Flu, Covid and strep all negative.  Most likely viral prodrome.  Treat symptomatically with rest, good hydration, tylenol and ibuprofen as needed.  Consider using Coricidine HBP as well.  If symptoms worsen or change, call office.     Plan of care reviewed with patient at the conclusion of today's visit. Education was provided regarding diagnosis, management and any prescribed or recommended OTC medications.  Patient verbalizes understanding of and agreement with management plan.    Dictated Utilizing Dragon Dictation     Please note that portions of this note were completed with a voice recognition program.     Part of this note may be an electronic transcription/translation of spoken language to printed text using the Dragon Dictation System.    No follow-ups on file.     Gertrude Suero PA-C

## 2022-11-28 ENCOUNTER — HOSPITAL ENCOUNTER (OUTPATIENT)
Dept: NUCLEAR MEDICINE | Facility: HOSPITAL | Age: 62
Discharge: HOME | End: 2022-11-28

## 2022-11-28 DIAGNOSIS — R07.81 RIB PAIN ON RIGHT SIDE: ICD-10-CM

## 2022-11-28 DIAGNOSIS — C50.911 MALIGNANT NEOPLASM OF RIGHT BREAST IN FEMALE, ESTROGEN RECEPTOR POSITIVE, UNSPECIFIED SITE OF BREAST: ICD-10-CM

## 2022-11-28 DIAGNOSIS — Z17.0 MALIGNANT NEOPLASM OF RIGHT BREAST IN FEMALE, ESTROGEN RECEPTOR POSITIVE, UNSPECIFIED SITE OF BREAST: ICD-10-CM

## 2022-11-28 PROCEDURE — 78306 BONE IMAGING WHOLE BODY: CPT

## 2022-11-28 PROCEDURE — A9503 TC99M MEDRONATE: HCPCS | Performed by: NURSE PRACTITIONER

## 2022-11-28 PROCEDURE — 0 TECHNETIUM MEDRONATE KIT: Performed by: NURSE PRACTITIONER

## 2022-11-28 RX ORDER — TC 99M MEDRONATE 20 MG/10ML
23.8 INJECTION, POWDER, LYOPHILIZED, FOR SOLUTION INTRAVENOUS
Status: COMPLETED | OUTPATIENT
Start: 2022-11-28 | End: 2022-11-28

## 2022-11-28 RX ADMIN — Medication 23.8 MILLICURIE: at 11:20

## 2023-01-11 ENCOUNTER — HOSPITAL ENCOUNTER (OUTPATIENT)
Dept: MAMMOGRAPHY | Facility: HOSPITAL | Age: 63
Discharge: HOME | End: 2023-01-11
Admitting: INTERNAL MEDICINE
Payer: COMMERCIAL

## 2023-01-11 DIAGNOSIS — Z12.31 SCREENING MAMMOGRAM FOR BREAST CANCER: ICD-10-CM

## 2023-01-11 PROCEDURE — 77063 BREAST TOMOSYNTHESIS BI: CPT | Performed by: RADIOLOGY

## 2023-01-11 PROCEDURE — 77067 SCR MAMMO BI INCL CAD: CPT | Performed by: RADIOLOGY

## 2023-01-11 PROCEDURE — 77063 BREAST TOMOSYNTHESIS BI: CPT

## 2023-01-11 PROCEDURE — 77067 SCR MAMMO BI INCL CAD: CPT

## 2023-02-16 DIAGNOSIS — R06.83 SNORING: Primary | ICD-10-CM

## 2023-02-16 DIAGNOSIS — R40.0 DAYTIME SOMNOLENCE: ICD-10-CM

## 2023-02-17 ENCOUNTER — OFFICE VISIT (OUTPATIENT)
Dept: FAMILY MEDICINE CLINIC | Facility: CLINIC | Age: 63
End: 2023-02-17
Payer: COMMERCIAL

## 2023-02-17 VITALS
OXYGEN SATURATION: 95 % | WEIGHT: 153 LBS | HEIGHT: 66 IN | DIASTOLIC BLOOD PRESSURE: 72 MMHG | SYSTOLIC BLOOD PRESSURE: 118 MMHG | HEART RATE: 77 BPM | BODY MASS INDEX: 24.59 KG/M2

## 2023-02-17 DIAGNOSIS — J01.10 ACUTE NON-RECURRENT FRONTAL SINUSITIS: Primary | ICD-10-CM

## 2023-02-17 PROCEDURE — 99213 OFFICE O/P EST LOW 20 MIN: CPT | Performed by: INTERNAL MEDICINE

## 2023-02-17 RX ORDER — FLUTICASONE PROPIONATE 50 MCG
2 SPRAY, SUSPENSION (ML) NASAL DAILY
Qty: 5 ML | Refills: 0 | Status: SHIPPED | OUTPATIENT
Start: 2023-02-17

## 2023-02-17 RX ORDER — DOXYCYCLINE HYCLATE 100 MG/1
100 CAPSULE ORAL 2 TIMES DAILY
Qty: 20 CAPSULE | Refills: 0 | Status: SHIPPED | OUTPATIENT
Start: 2023-02-17 | End: 2023-02-20

## 2023-02-17 NOTE — PROGRESS NOTES
"Samanta Mera  1960  [id number]  Patient Care Team:  Gertrude Suero PA-C as PCP - General (Physician Assistant)  Concepcion Otto MD as Referring Physician (General Surgery)  Saumya Call MD as Consulting Physician (Radiation Oncology)  Amy Santillan MD as Consulting Physician (Hematology and Oncology)  Kim Mishra APRN as Nurse Practitioner (Oncology)    Samanta Mera is a 63 y.o. female here today for follow up.     This patient is accompanied by their self who contributes to the history of their care.    Chief Complaint:    Chief Complaint   Patient presents with   • Sinusitis     Sinus pressure and drainage   • Headache         History of Present Illness:  I have reviewed and/or updated the patient's past medical, past surgical, family, social history, problem list and allergies as appropriate.      Reports several day hx of severe sinus pain and pressure. Mucopurulent nasal discharge. No covid testing/flu testing  She is vaccinated.  No fevers or chills.  No cough or wheezing.  No sore throat or earache.  No loss of taste or smell.  He has tried over-the-counter Sudafed without any improvement.    Review of Systems   Constitutional: Positive for fatigue.   HENT: Positive for rhinorrhea and sinus pressure. Negative for ear pain, sore throat and voice change.    Eyes: Negative.    Respiratory: Negative.    Neurological: Positive for headache.       Vitals:    02/17/23 1443   BP: 118/72   Pulse: 77   SpO2: 95%   Weight: 69.4 kg (153 lb)   Height: 167.6 cm (65.98\")     Body mass index is 24.71 kg/m².    Physical Exam  Vitals and nursing note reviewed.   Constitutional:       General: She is not in acute distress.     Appearance: She is well-developed. She is not diaphoretic.   HENT:      Head: Normocephalic and atraumatic.      Right Ear: External ear normal.      Left Ear: External ear normal.      Mouth/Throat:      Pharynx: No oropharyngeal exudate.      Comments: Mucopurulent PND.  " Frontal sinus to palpation  Eyes:      General: No scleral icterus.        Right eye: No discharge.      Conjunctiva/sclera: Conjunctivae normal.   Neck:      Thyroid: No thyromegaly.      Vascular: No JVD.      Trachea: No tracheal deviation.   Cardiovascular:      Rate and Rhythm: Normal rate and regular rhythm.      Heart sounds: Normal heart sounds.      Comments: PMI nondisplaced  Pulmonary:      Effort: Pulmonary effort is normal.      Breath sounds: Normal breath sounds. No wheezing or rales.   Abdominal:      General: Bowel sounds are normal.      Palpations: Abdomen is soft.      Tenderness: There is no abdominal tenderness. There is no guarding or rebound.   Musculoskeletal:      Cervical back: Normal range of motion and neck supple.      Comments: Normal gait   Lymphadenopathy:      Cervical: No cervical adenopathy.   Skin:     General: Skin is warm and dry.      Capillary Refill: Capillary refill takes less than 2 seconds.      Coloration: Skin is not pale.      Findings: No rash.   Neurological:      Mental Status: She is alert and oriented to person, place, and time.      Motor: No abnormal muscle tone.      Coordination: Coordination normal.   Psychiatric:         Judgment: Judgment normal.         Procedures    Results Review:    None    Assessment/Plan:     Problem List Items Addressed This Visit        ENT    Acute non-recurrent frontal sinusitis - Primary    Overview     Doxycycline x14 days.  Recommend Amisha pot Flonase 2 puffs twice daily.  Continue Sudafed.            Plan of care reviewed with patient at the conclusion of today's visit. Education was provided regarding diagnosis and management.  Patient verbalizes understanding of and agreement with management plan.    No follow-ups on file.    Trino Dyson MD      Please note than portions of this note were completed [facility] a Voice Recognition Program          Answers for HPI/ROS submitted by the patient on 2/17/2023  Please describe your  symptoms.: Sinus pressure , headache, drainage  Have you had these symptoms before?: Yes  How long have you been having these symptoms?: 1-4 days  Please list any medications you are currently taking for this condition.: Anastrazole  What is the primary reason for your visit?: Other

## 2023-02-20 ENCOUNTER — PATIENT MESSAGE (OUTPATIENT)
Dept: FAMILY MEDICINE CLINIC | Facility: CLINIC | Age: 63
End: 2023-02-20
Payer: COMMERCIAL

## 2023-02-20 RX ORDER — LEVOFLOXACIN 750 MG/1
750 TABLET ORAL DAILY
Qty: 10 TABLET | Refills: 0 | Status: SHIPPED | OUTPATIENT
Start: 2023-02-20

## 2023-05-03 ENCOUNTER — OFFICE VISIT (OUTPATIENT)
Dept: ONCOLOGY | Facility: CLINIC | Age: 63
End: 2023-05-03
Payer: COMMERCIAL

## 2023-05-03 VITALS
TEMPERATURE: 97.6 F | SYSTOLIC BLOOD PRESSURE: 136 MMHG | BODY MASS INDEX: 24.11 KG/M2 | RESPIRATION RATE: 18 BRPM | HEIGHT: 66 IN | OXYGEN SATURATION: 98 % | DIASTOLIC BLOOD PRESSURE: 73 MMHG | HEART RATE: 66 BPM | WEIGHT: 150 LBS

## 2023-05-03 DIAGNOSIS — C50.911 MALIGNANT NEOPLASM OF RIGHT BREAST IN FEMALE, ESTROGEN RECEPTOR POSITIVE, UNSPECIFIED SITE OF BREAST: Primary | ICD-10-CM

## 2023-05-03 DIAGNOSIS — Z17.0 MALIGNANT NEOPLASM OF RIGHT BREAST IN FEMALE, ESTROGEN RECEPTOR POSITIVE, UNSPECIFIED SITE OF BREAST: Primary | ICD-10-CM

## 2023-05-03 PROCEDURE — 99214 OFFICE O/P EST MOD 30 MIN: CPT | Performed by: INTERNAL MEDICINE

## 2023-05-03 NOTE — PROGRESS NOTES
"      PROBLEM LIST:  1. [id number] (stage IA) ER+ MI+ her2 negative invasive ductal carcinoma of the right breast  A) presented with an abnormality on screening mammogram.  She underwent lumpectomy on 2/27/18.  Pathology showed a 5 mm grade 1 IDC.  0/4 SLN involved.  B) radiation completed 4/18/18.  Anastrozole started April 2018.       Subjective     HISTORY OF PRESENT ILLNESS:   Samanta Mera returns for follow-up.  She has now been on endocrine therapy for 5 years.  She says she still has some hot flashes.  No new complaints or concerns today.            Objective      /73   Pulse 66   Temp 97.6 °F (36.4 °C)   Resp 18   Ht 167.6 cm (66\")   Wt 68 kg (150 lb)   SpO2 98%   BMI 24.21 kg/m²    Vitals:    05/03/23 1024   PainSc: 0-No pain             Performance Status: 0    General: well appearing female in no acute distress  Neuro: alert and oriented  HEENT: sclera anicteric, oropharynx clear  Lymphatics: no cervical, supraclavicular, or axillary adenopathy  Extremeties: no lower extremity edema  Skin: no rashes, lesions, bruising, or petechiae  Psych: mood and affect appropriate            Assessment & Plan   Samanta Mera is a 63 y.o. year old female with stage IA er+ her2 negative breast cancer.  She returns for follow-up on anastrozole.      She is doing well.  She had a very small low-grade tumor and we will plan to stop anastrozole now.  She has completed 5 years of adjuvant endocrine therapy.    She will be due for repeat screening mammogram in January 2024.  This can be ordered by her PCP going forward.    Bone density from March 2021 showed normal bone density.  She has a repeat bone density scan that is scheduled in the near future.    We discussed the option of following up once yearly in our survivorship clinic versus follow-up with her PCP.  She would like to follow-up with her primary care provider.  We are happy to see her at any point down the road if there are are questions or concerns.      "             Amy Santillan MD  [facility] Hematology and Oncology    5/3/2023          CC:

## 2023-05-05 DIAGNOSIS — N95.1 MENOPAUSAL VAGINAL DRYNESS: Primary | ICD-10-CM

## 2023-05-05 RX ORDER — GLYCERIN/MIN OIL/POLYCARBOPHIL
GEL WITH APPLICATOR (GRAM) VAGINAL
Qty: 35 G | Refills: 5 | Status: SHIPPED | OUTPATIENT
Start: 2023-05-05

## 2023-05-23 ENCOUNTER — OFFICE VISIT (OUTPATIENT)
Dept: SLEEP MEDICINE | Facility: HOSPITAL | Age: 63
End: 2023-05-23
Payer: COMMERCIAL

## 2023-05-23 VITALS
HEIGHT: 66 IN | BODY MASS INDEX: 23.91 KG/M2 | SYSTOLIC BLOOD PRESSURE: 142 MMHG | OXYGEN SATURATION: 97 % | DIASTOLIC BLOOD PRESSURE: 67 MMHG | WEIGHT: 148.8 LBS | HEART RATE: 62 BPM

## 2023-05-23 DIAGNOSIS — G47.19 EXCESSIVE DAYTIME SLEEPINESS: Primary | ICD-10-CM

## 2023-05-23 DIAGNOSIS — R06.83 SNORING: ICD-10-CM

## 2023-05-23 DIAGNOSIS — G47.33 OBSTRUCTIVE SLEEP APNEA, ADULT: ICD-10-CM

## 2023-05-23 RX ORDER — SACCHAROMYCES BOULARDII 250 MG
250 CAPSULE ORAL 2 TIMES DAILY
COMMUNITY

## 2023-05-23 NOTE — PROGRESS NOTES
"  Samanta Mera is a 63 y.o. female.   Chief Complaint   Patient presents with   • Sleeping Problem       HPI     63 y.o. female seen in consultation at the request of Gertrude Suero,* for evaluation of the above.     She describes a longstanding and worsening sense of tiredness during the day and lack of energy.    She feels her sleep is disturbed and late.  She awakens 2 or 3 times per night.    Her  has noted heavy snoring that occurs in all positions but he has not noted specific apneas.    She denies any RLS type symptoms.  She has no nocturnal hallucinations or sleep paralysis.  She averages 6 or 7 hours of sleep per night.  She initiates sleep fairly quickly.    Secretary Scale is: 5/24    The patient's relevant past medical, surgical, family, and social history reviewed and updated in Epic as appropriate.    Current medications are:   Current Outpatient Medications:   •  fluticasone (FLONASE) 50 MCG/ACT nasal spray, 2 sprays into the nostril(s) as directed by provider Daily., Disp: 5 mL, Rfl: 0  •  saccharomyces boulardii (FLORASTOR) 250 MG capsule, Take 1 capsule by mouth 2 (Two) Times a Day., Disp: , Rfl: .    Review of Systems    Review of Systems  ROS documented in patient questionnaire ×14 systems.  Reviewed with patient.  Otherwise negative except as noted in HPI.    Physical Exam    Blood pressure 142/67, pulse 62, height 167.6 cm (66\"), weight 67.5 kg (148 lb 12.8 oz), SpO2 97 %, not currently breastfeeding. Body mass index is 24.02 kg/m².    Physical Exam  Vitals and nursing note reviewed.   Constitutional:       Appearance: Normal appearance. She is well-developed.   HENT:      Head: Normocephalic and atraumatic.      Nose: Nose normal.      Mouth/Throat:      Mouth: Mucous membranes are moist.      Pharynx: Oropharynx is clear. No oropharyngeal exudate.      Comments: Class I airway  Eyes:      General: No scleral icterus.     Conjunctiva/sclera: Conjunctivae normal.   Neck:      " Thyroid: No thyromegaly.      Trachea: No tracheal deviation.   Cardiovascular:      Rate and Rhythm: Normal rate and regular rhythm.      Heart sounds: No murmur heard.    No friction rub. No gallop.   Pulmonary:      Effort: Pulmonary effort is normal. No respiratory distress.      Breath sounds: No wheezing or rales.   Musculoskeletal:         General: No deformity. Normal range of motion.   Skin:     General: Skin is warm and dry.      Findings: No rash.   Neurological:      Mental Status: She is alert and oriented to person, place, and time.   Psychiatric:         Behavior: Behavior normal.         Thought Content: Thought content normal.         DATA:    Reviewed 2/17/2023 note from Trino Dyson MD    Reviewed metabolic profile and CBC dated 4/20/2022    ASSESSMENT:    Problem List Items Addressed This Visit    None  Visit Diagnoses     Excessive daytime sleepiness    -  Primary    Relevant Orders    Home Sleep Study    Obstructive sleep apnea, adult        Relevant Orders    Home Sleep Study    Snoring        Relevant Orders    Home Sleep Study          63-year-old female who presents with snoring, disturbed sleep, and daytime somnolence which has been chronic and progressive.  Despite her normal BMI and class I airway, she is at high risk for obstructive sleep apnea.  There is no better explanation for her current signs and symptoms.  She therefore warrants further diagnostic evaluation.  My recommendations are as below and this was discussed in detail with the patient.    PLAN:    1. Home sleep apnea testing is an appropriate initial diagnostic step in her case.  2. I discussed the diagnostic process as well as treatment options for obstructive sleep apnea if that is diagnosed.  3. I went over the long-term cardiovascular and metabolic risks of untreated obstructive sleep apnea.  4. The patient was amenable to a trial of CPAP therapy if deemed appropriate after testing complete.  5. Close sleep center  follow-up.      I have reviewed the results of my evaluation and impression and discussed my recommendations in detail with the patient.    Signed by  Jose Sharma MD    May 23, 2023      CC: Gertrude Suero, Gertrude You,*

## 2023-05-26 ENCOUNTER — HOSPITAL ENCOUNTER (OUTPATIENT)
Dept: BONE DENSITY | Facility: HOSPITAL | Age: 63
Discharge: HOME | End: 2023-05-26
Payer: COMMERCIAL

## 2023-05-26 DIAGNOSIS — C50.911 MALIGNANT NEOPLASM OF RIGHT BREAST IN FEMALE, ESTROGEN RECEPTOR POSITIVE, UNSPECIFIED SITE OF BREAST: ICD-10-CM

## 2023-05-26 DIAGNOSIS — Z79.811 LONG TERM CURRENT USE OF AROMATASE INHIBITOR: ICD-10-CM

## 2023-05-26 DIAGNOSIS — Z17.0 MALIGNANT NEOPLASM OF RIGHT BREAST IN FEMALE, ESTROGEN RECEPTOR POSITIVE, UNSPECIFIED SITE OF BREAST: ICD-10-CM

## 2023-05-26 PROCEDURE — 77080 DXA BONE DENSITY AXIAL: CPT

## 2023-06-12 ENCOUNTER — HOSPITAL ENCOUNTER (OUTPATIENT)
Dept: SLEEP MEDICINE | Facility: HOSPITAL | Age: 63
End: 2023-06-12
Payer: COMMERCIAL

## 2023-06-12 VITALS — WEIGHT: 148 LBS | BODY MASS INDEX: 23.78 KG/M2 | HEIGHT: 66 IN

## 2023-06-12 DIAGNOSIS — G47.19 EXCESSIVE DAYTIME SLEEPINESS: ICD-10-CM

## 2023-06-12 DIAGNOSIS — R06.83 SNORING: ICD-10-CM

## 2023-06-12 DIAGNOSIS — G47.33 OBSTRUCTIVE SLEEP APNEA, ADULT: ICD-10-CM

## 2023-06-12 PROCEDURE — 95800 SLP STDY UNATTENDED: CPT

## 2023-06-15 DIAGNOSIS — G47.33 OSA (OBSTRUCTIVE SLEEP APNEA): Primary | ICD-10-CM

## 2023-06-19 ENCOUNTER — PATIENT MESSAGE (OUTPATIENT)
Dept: SLEEP MEDICINE | Facility: HOSPITAL | Age: 63
End: 2023-06-19
Payer: COMMERCIAL

## 2023-06-21 PROBLEM — Z90.710 HISTORY OF HYSTERECTOMY: Status: ACTIVE | Noted: 2023-06-21

## 2023-06-21 PROBLEM — M85.852 OSTEOPENIA OF NECK OF LEFT FEMUR: Status: ACTIVE | Noted: 2023-06-21

## 2023-07-24 ENCOUNTER — TREATMENT (OUTPATIENT)
Dept: PHYSICAL THERAPY | Facility: CLINIC | Age: 63
End: 2023-07-24
Payer: COMMERCIAL

## 2023-07-24 DIAGNOSIS — M54.16 RADICULOPATHY, LUMBAR REGION: Primary | ICD-10-CM

## 2023-07-24 PROCEDURE — 97162 PT EVAL MOD COMPLEX 30 MIN: CPT | Performed by: PHYSICAL THERAPIST

## 2023-07-24 PROCEDURE — 97140 MANUAL THERAPY 1/> REGIONS: CPT | Performed by: PHYSICAL THERAPIST

## 2023-07-24 PROCEDURE — 97110 THERAPEUTIC EXERCISES: CPT | Performed by: PHYSICAL THERAPIST

## 2023-07-24 NOTE — PROGRESS NOTES
Physical Therapy Initial Evaluation and Plan of Care  [address]  [place] [address]    Patient: Samanta Mera   : 1960  Diagnosis/ICD-10 Code:  No primary diagnosis found.  Referring practitioner: Gertrude Suero, *  Date of Initial Visit: 2023  Today's Date: 2023  Patient seen for Visit count could not be calculated. Make sure you are using a visit which is associated with an episode. session         Visit Diagnoses:  No diagnosis found.      Subjective Questionnaire: Oswestry: 34%      Subjective Evaluation    History of Present Illness  Mechanism of injury: Pt is a 63 year old female presenting to the clinic with left sided low back pain with radicular symptoms down the front of the left thigh. This began around a month ago, insidious on set. She feels tingling in the thigh. Her back hurts constantly and if she is more active she will get tingling down her leg. She tried steroids which helped at the time but the pain returned after she was finished with the medication. She takes flexiril which helps but makes sure sleepy.       Patient Occupation: Nurse Quality of life: excellent    Pain  Current pain rating: 3  At worst pain ratin  Quality: dull ache  Aggravating factors: ambulation, repetitive movement and movement  Progression: worsening    Diagnostic Tests  X-ray: abnormal    Patient Goals  Patient goals for therapy: decreased pain, increased motion, increased strength, independence with ADLs/IADLs and return to sport/leisure activities           Objective          Neurological Testing     Sensation     Lumbar   Left   Diminished: light touch    Right   Intact: light touch    Comments   Left light touch: S1    Active Range of Motion     Lumbar   Flexion: 65 degrees   Extension: 10 degrees   Left lateral flexion: 10 degrees with pain  Right lateral flexion: 7 degrees     Strength/Myotome Testing     Left Hip   Planes of Motion   Flexion: 4+    Right Hip    Planes of Motion   Flexion: 5    Left Knee   Flexion: 4+  Extension: 4+    Right Knee   Flexion: 5  Extension: 5    Left Ankle/Foot   Dorsiflexion: 5    Right Ankle/Foot   Dorsiflexion: 5    Tests       Thoracic   Positive slump.     Lumbar     Left   Positive passive SLR.         Assessment & Plan     Assessment  Impairments: abnormal muscle tone, abnormal or restricted ROM, activity intolerance, impaired physical strength, lacks appropriate home exercise program and pain with function  Functional Limitations: walking, uncomfortable because of pain, stooping and unable to perform repetitive tasks  Assessment details: Pt is a 63 year old female presenting to the clinic with low back pain and radicular symptoms effecting her left leg. She demonstrates decreased lumbar AROM, decreased strength of the hips, and positive slump and SLR. Her impairments are limiting the amount of time she can spend standing without pain. Pt would benefit from skilled PT to address her impairments so she can reach her long term goals.   Prognosis: good    Goals  Plan Goals: SHORT TERM GOALS:     2 weeks  1. Pt independent with HEP  2. Pt to demonstrate trunk AROM 50-75% of expected norms to allow for improved ability to perform ADL's  3. Pt to demonstrate bilateral hip strength 4/5 in all planes to improved stability of the core/trunk     LONG TERM GOALS:   6 weeks  1. Pt to demonstrate trunk AROM % of expected norms to allow for improved ability to perform functional activities  2. Pt to demonstrate ability to perform full functional squat with good form and without increased pain in the low back   3. Pt to report being able to work full shift or work in the home without increase in pain in the back  4. Pt to report cessation of pain/numbness/tingling into the left leg to show decreased nerve compression     Plan  Therapy options: will be seen for skilled therapy services  Planned modality interventions: cryotherapy, dry needling,  electrical stimulation/Russian stimulation, high voltage pulsed current (pain management), TENS and thermotherapy (hydrocollator packs)  Planned therapy interventions: abdominal trunk stabilization, manual therapy, neuromuscular re-education, postural training, soft tissue mobilization, spinal/joint mobilization, stretching, strengthening, therapeutic activities, home exercise program, gait training, functional ROM exercises and body mechanics training  Duration in visits: 1  Duration in weeks: 12  Treatment plan discussed with: patient    History # of Personal Factors and/or Comorbidities: MODERATE (1-2)  Examination of Body System(s): # of elements: MODERATE (3)  Clinical Presentation: STABLE   Clinical Decision Making: MODERATE      Timed:         Manual Therapy:    8     mins  43825;     Therapeutic Exercise:    10     mins  11071;     Neuromuscular Ross:        mins  05950;    Therapeutic Activity:          mins  66689;     Gait Training:           mins  20616;     Ultrasound:          mins  63648;    Ionto                                   mins   38957  Self Care                            mins   31412  Canalith Repos         mins 35049      Un-Timed:  Electrical Stimulation:         mins  54270 ( );  Dry Needling          mins self-pay  Traction          mins 23469  Low Eval          Mins  46923  Mod Eval     28     Mins  57551  High Eval                            Mins  87441        Timed Treatment:   18   mins   Total Treatment:     46   mins          PT: Betina Markham PT   License Number: [id number]  Electronically signed by Betina Markham PT, 07/24/23, 10:10 AM EDT    Certification Period: 7/24/2023 thru 10/21/2023  I certify that the therapy services are furnished while this patient is under my care.  The services outlined above are required by this patient, and will be reviewed every 90 days.         Physician  Signature:__________________________________________________    PHYSICIAN: Gertrude Suero PA-C  NPI: [id number]                                      DATE:      Please sign and return via fax to [email or web address] . Thank you, [facility].

## 2023-08-01 ENCOUNTER — TELEPHONE (OUTPATIENT)
Dept: SLEEP MEDICINE | Facility: HOSPITAL | Age: 63
End: 2023-08-01
Payer: COMMERCIAL

## 2023-08-01 DIAGNOSIS — G47.33 OSA (OBSTRUCTIVE SLEEP APNEA): Primary | ICD-10-CM

## 2023-08-08 ENCOUNTER — TELEPHONE (OUTPATIENT)
Dept: SLEEP MEDICINE | Facility: HOSPITAL | Age: 63
End: 2023-08-08

## 2023-08-08 NOTE — TELEPHONE ENCOUNTER
Caller: Samanta Mera    Relationship to patient: Self    Best call back number: [phone]     Patient is needing: TO TALK ABOUT THE LEVEL OF HER CPAP MACHINE SHE SAYS IT SEEMS TO BE BLOWING OUT TOO STRONG

## 2023-08-09 ENCOUNTER — TREATMENT (OUTPATIENT)
Dept: PHYSICAL THERAPY | Facility: CLINIC | Age: 63
End: 2023-08-09
Payer: COMMERCIAL

## 2023-08-09 DIAGNOSIS — M54.16 RADICULOPATHY, LUMBAR REGION: Primary | ICD-10-CM

## 2023-08-09 PROCEDURE — 97112 NEUROMUSCULAR REEDUCATION: CPT | Performed by: PHYSICAL THERAPIST

## 2023-08-09 PROCEDURE — 97140 MANUAL THERAPY 1/> REGIONS: CPT | Performed by: PHYSICAL THERAPIST

## 2023-08-09 PROCEDURE — 97110 THERAPEUTIC EXERCISES: CPT | Performed by: PHYSICAL THERAPIST

## 2023-08-09 NOTE — PROGRESS NOTES
Physical Therapy Daily Progress Note    Subjective   Samanta Mera reports: she does not feel any better and her symptoms are starting to go down her leg more.      Objective   See Exercise, Manual, and Modality Logs for complete treatment.       Assessment/Plan  Pt tolerated treatment well. She required cues to keep her back flat with bird dogs. She was educated on an updated HEP. Pt would benefit from continued skilled PT.     Progress per Plan of Care           Manual Therapy:    10     mins  68523;  Therapeutic Exercise:    10     mins  57394;     Neuromuscular Ross:    10    mins  04886;    Therapeutic Activity:          mins  93747;     Gait Training:           mins  55102;     Ultrasound:          mins  55123;    Electrical Stimulation:         mins  67396 ( );  E-Stim Attended:         mins  58402  Iontophoresis          mins 62852   Traction          mins  10822  Fluidotherapy          mins  47225  Dry Needling          mins self-pay - No Charge  Paraffin          mins  69597    Timed Treatment:   30   mins   Total Treatment:     46   mins    Betina Markham, PT, DPT  Physical Therapist

## 2023-08-10 ENCOUNTER — PATIENT MESSAGE (OUTPATIENT)
Dept: FAMILY MEDICINE CLINIC | Facility: CLINIC | Age: 63
End: 2023-08-10

## 2023-08-10 DIAGNOSIS — G47.33 OSA (OBSTRUCTIVE SLEEP APNEA): Primary | ICD-10-CM

## 2023-08-10 NOTE — TELEPHONE ENCOUNTER
Have spoken to patient  and I did a pressure change order she has an appointment already made for August 21

## 2023-08-16 ENCOUNTER — TREATMENT (OUTPATIENT)
Dept: PHYSICAL THERAPY | Facility: CLINIC | Age: 63
End: 2023-08-16
Payer: COMMERCIAL

## 2023-08-16 ENCOUNTER — HOSPITAL ENCOUNTER (OUTPATIENT)
Dept: MRI IMAGING | Facility: HOSPITAL | Age: 63
Discharge: HOME | End: 2023-08-16
Admitting: PHYSICIAN ASSISTANT
Payer: COMMERCIAL

## 2023-08-16 DIAGNOSIS — M43.16 ANTEROLISTHESIS OF LUMBAR SPINE: ICD-10-CM

## 2023-08-16 DIAGNOSIS — M54.50 ACUTE MIDLINE LOW BACK PAIN WITHOUT SCIATICA: ICD-10-CM

## 2023-08-16 DIAGNOSIS — M54.16 RADICULOPATHY, LUMBAR REGION: Primary | ICD-10-CM

## 2023-08-16 PROCEDURE — 97140 MANUAL THERAPY 1/> REGIONS: CPT | Performed by: PHYSICAL THERAPIST

## 2023-08-16 PROCEDURE — 97110 THERAPEUTIC EXERCISES: CPT | Performed by: PHYSICAL THERAPIST

## 2023-08-16 PROCEDURE — 72148 MRI LUMBAR SPINE W/O DYE: CPT

## 2023-08-16 NOTE — PROGRESS NOTES
Physical Therapy Daily Treatment Note                  [address] [place] [address]       Patient: Samanta Mera   : 1960  Diagnosis/ICD-10 Code:  Radiculopathy, lumbar region [M54.16]  Referring practitioner: Gertrude Suero, *  Date of Initial Visit: Type: THERAPY  Noted: 2023  Today's Date: 2023  Patient seen for 3 sessions             Subjective   Samanta Mera reports: actually feeling the best she has felt in a while. No pain down the leg, just some discomfort in the middle of low back 1/10 pain at rest. Getting the MRI today after therapy session.     Objective          Palpation   Left   Hypertonic in the quadratus lumborum.   Muscle spasm in the quadratus lumborum.   Tenderness of the quadratus lumborum.       See Exercise, Manual, and Modality Logs for complete treatment.       Assessment/Plan  Found the left QL to be problematic when palpating. Able to manually decrease tone and pain of the QL followed by stretching. Did not complete strengthening exercises today due to MRI with prolong lying position.   Progress per Plan of Care and Progress strengthening /stabilization /functional activity           Timed:  Manual Therapy:    10     mins  13505;  Therapeutic Exercise:    30     mins  25914;     Neuromuscular Ross:        mins  23338;    Therapeutic Activity:          mins  07335;     Gait Training:           mins  00388;     Ultrasound:          mins  55095;    Electrical Stimulation:         mins  07884;  Iontophoresis          mins  71158    Untimed:  Electrical Stimulation:         mins  02981 ( );  Mechanical Traction:         mins  84135;   Fluidotherapy          mins  94524    Timed Treatment:   40   mins   Total Treatment:     40   mins        Shy Gurrola PTA  Physical Therapist Assistant

## 2023-08-17 ENCOUNTER — TELEPHONE (OUTPATIENT)
Dept: SLEEP MEDICINE | Facility: HOSPITAL | Age: 63
End: 2023-08-17

## 2023-08-18 ENCOUNTER — TELEPHONE (OUTPATIENT)
Dept: FAMILY MEDICINE CLINIC | Facility: CLINIC | Age: 63
End: 2023-08-18
Payer: COMMERCIAL

## 2023-08-18 DIAGNOSIS — M54.16 LUMBAR BACK PAIN WITH RADICULOPATHY AFFECTING LEFT LOWER EXTREMITY: Primary | ICD-10-CM

## 2023-08-18 DIAGNOSIS — M54.50 ACUTE MIDLINE LOW BACK PAIN WITHOUT SCIATICA: ICD-10-CM

## 2023-08-18 RX ORDER — METHYLPREDNISOLONE 4 MG/1
TABLET ORAL
Qty: 21 EACH | Refills: 0 | Status: SHIPPED | OUTPATIENT
Start: 2023-08-18 | End: 2023-08-21

## 2023-08-18 NOTE — TELEPHONE ENCOUNTER
Spoke with patient regarding MRI.  Will start referral to pain management.  Going to [place] in mid-September.  Steroids helped but only while she was taking them.  Will provide steroid pack for strip.  Her pain is in left leg.  Will start referral to neurosurgery as well.  Discussed gabapentin, she will let me know if she is interested in taking this.

## 2023-08-21 ENCOUNTER — OFFICE VISIT (OUTPATIENT)
Dept: SLEEP MEDICINE | Facility: HOSPITAL | Age: 63
End: 2023-08-21
Payer: COMMERCIAL

## 2023-08-21 VITALS
WEIGHT: 149.6 LBS | HEIGHT: 66 IN | OXYGEN SATURATION: 97 % | BODY MASS INDEX: 24.04 KG/M2 | DIASTOLIC BLOOD PRESSURE: 64 MMHG | HEART RATE: 60 BPM | SYSTOLIC BLOOD PRESSURE: 146 MMHG

## 2023-08-21 DIAGNOSIS — G47.33 OSA (OBSTRUCTIVE SLEEP APNEA): Primary | ICD-10-CM

## 2023-08-21 PROCEDURE — 99213 OFFICE O/P EST LOW 20 MIN: CPT | Performed by: NURSE PRACTITIONER

## 2023-08-21 NOTE — PROGRESS NOTES
Chief Complaint:   Chief Complaint   Patient presents with    Follow-up       HPI:    Samanta Mera is a 63 y.o. female here for follow-up of sleep apnea.  Patient was seen 5/23/2023 for tired, no energy, frequent awakenings, and snoring.  Patient had a sleep study 6/12/2023 that did show mild obstructive sleep apnea.  We have adjusted pressure x2 patient states she is still having abdominal distention and belching.  We have talked today about the importance of keeping her mouth closed using chinstrap and/or lip tape.  Patient states she has yet to try these but will do so now.  We will also take pressure 6-13.5.  She does get 6 hours of sleep nightly and does not awaken during the night.  Patient has an [place] score of 3/24.  Patient will continue trying to be compliant and we will get pressures adjusted today.        Current medications are:   Current Outpatient Medications:     cyclobenzaprine (FLEXERIL) 10 MG tablet, Take 1 tablet by mouth 3 (Three) Times a Day As Needed for Muscle Spasms., Disp: 20 tablet, Rfl: 0    traMADol (ULTRAM) 50 MG tablet, Take 1 tablet by mouth Every 8 (Eight) Hours As Needed for Moderate Pain or Severe Pain., Disp: 10 tablet, Rfl: 0.      The patient's relevant past medical, surgical, family and social history were reviewed and updated in Epic as appropriate.       Review of Systems   Respiratory:  Positive for apnea.    Gastrointestinal:  Positive for abdominal distention.   Psychiatric/Behavioral:  Positive for sleep disturbance.    All other systems reviewed and are negative.      Objective:    Physical Exam  Constitutional:       Appearance: Normal appearance.   HENT:      Head: Normocephalic and atraumatic.      Mouth/Throat:      Comments: Mallampati 1 anatomy  Cardiovascular:      Rate and Rhythm: Normal rate and regular rhythm.   Pulmonary:      Effort: Pulmonary effort is normal.      Breath sounds: Normal breath sounds.   Skin:     General: Skin is warm and dry.    Neurological:      Mental Status: She is alert and oriented to person, place, and time.   Psychiatric:         Mood and Affect: Mood normal.         Behavior: Behavior normal.         Thought Content: Thought content normal.         Judgment: Judgment normal.       CPAP Report    4/4 days of use  Greater than 4-hour use 100%  Settings 8-18  AHI 0.9  95th percentile pressure 14.2  The patient continues to use and benefit from CPAP therapy.    ASSESSMENT/PLAN    Diagnoses and all orders for this visit:    1. CELESTINO (obstructive sleep apnea) (Primary)  -     PAP Therapy        Counseled patient regarding multimodal approach with healthy nutrition, healthy sleep, regular physical activity, social activities, counseling, and medications. Encouraged to practice lateral sleep position. Avoid alcohol and sedatives close to bedtime.      Setting change 6-13.5 she will consider an chinstrap versus lip tape I will see her back in 6 weeks to reassess.  I have reviewed the results of my evaluation and impression and discussed my recommendations in detail with the patient.      Signed by  BRYAN France    August 21, 2023      CC: Gertrude Suero, XU         No ref. provider found

## 2023-08-22 ENCOUNTER — TELEPHONE (OUTPATIENT)
Dept: SLEEP MEDICINE | Facility: HOSPITAL | Age: 63
End: 2023-08-22
Payer: COMMERCIAL

## 2023-08-22 NOTE — TELEPHONE ENCOUNTER
KATIE FROM [employer] SAID THAT THE ORDER FOR PRESSURE CHANGE CAN NOT BE 13.5. THE ORDER CAN BE EITHER 13.4 OR 13.6 PLEASE ADVISE.. [facility]

## 2023-08-23 ENCOUNTER — OFFICE VISIT (OUTPATIENT)
Dept: PAIN MEDICINE | Facility: CLINIC | Age: 63
End: 2023-08-23
Payer: COMMERCIAL

## 2023-08-23 ENCOUNTER — HOSPITAL ENCOUNTER (OUTPATIENT)
Dept: GENERAL RADIOLOGY | Facility: HOSPITAL | Age: 63
Discharge: HOME | End: 2023-08-23
Admitting: STUDENT IN AN ORGANIZED HEALTH CARE EDUCATION/TRAINING PROGRAM
Payer: COMMERCIAL

## 2023-08-23 VITALS
TEMPERATURE: 98 F | DIASTOLIC BLOOD PRESSURE: 80 MMHG | HEART RATE: 64 BPM | WEIGHT: 149 LBS | HEIGHT: 66 IN | BODY MASS INDEX: 23.95 KG/M2 | SYSTOLIC BLOOD PRESSURE: 128 MMHG

## 2023-08-23 DIAGNOSIS — M54.16 LUMBAR RADICULOPATHY: Primary | ICD-10-CM

## 2023-08-23 DIAGNOSIS — M71.38 CYST OF LUMBAR FACET JOINT: ICD-10-CM

## 2023-08-23 DIAGNOSIS — M54.16 LUMBAR RADICULOPATHY: ICD-10-CM

## 2023-08-23 PROCEDURE — 72114 X-RAY EXAM L-S SPINE BENDING: CPT

## 2023-08-23 PROCEDURE — 99204 OFFICE O/P NEW MOD 45 MIN: CPT | Performed by: STUDENT IN AN ORGANIZED HEALTH CARE EDUCATION/TRAINING PROGRAM

## 2023-08-23 NOTE — PROGRESS NOTES
Referring Physician: Gertrude Suero PA-C  [address] [place] [address]    Primary Physician: Gertrude Suero PA-C    CHIEF COMPLAINT or REASON FOR VISIT: Back Pain (Lumbar back pain with radiculopathy affecting left lower extremity)      Initial history of present illness on 2023:  Ms. Samanta Mera is 63 y.o. female who presents as a new patient referral for evaluation treatment of left-sided low back pain with radiation to left lower extremity.  She states that this pain started in 2023 without any inciting event or trauma.  She describes numbness tingling and pain radiating from the left side of her lumbar spine down the lateral aspect of her leg to the ankle.  Patient denies any bowel or bladder dysfunction, lower extremity weakness, new onset saddle anesthesia or unexplained weight loss.   She has tried physical therapy, NSAIDs, cyclobenzaprine, tramadol with continued pain.  She has never had any spine surgeries or interventions/injections.    Interval history:    Interventions:      Objective Pain Scoring:   BRIEF PAIN INVENTORY:  Total score:   Pain Score    23 1308   PainSc:   5   PainLoc: Back      PHQ-2: PHQ-2 Total Score: 0  PHQ-9: PHQ-9: Brief Depression Severity Measure Score: 0  Opioid Risk Tool:         Review of Systems:   ROS negative except as otherwise noted     Past Medical History:   Past Medical History:   Diagnosis Date    Drug therapy     Hx of radiation therapy     Malignant neoplasm of right breast in female, estrogen receptor positive 3/7/2018         Past Surgical History:   Past Surgical History:   Procedure Laterality Date    BREAST BIOPSY      BREAST LUMPECTOMY Right      SECTION      X 3    COLONOSCOPY  2019    HYSTERECTOMY      age 55, full    OOPHORECTOMY           Family History   Family History   Problem Relation Age of Onset    Hypertension Mother     Cancer Mother     Cancer Father     Diabetes Daughter     Hypertension  "Sister     Hypertension Sister     Breast cancer Neg Hx     Ovarian cancer Neg Hx          Social History   Social History     Socioeconomic History    Marital status:    Tobacco Use    Smoking status: Never    Smokeless tobacco: Never   Vaping Use    Vaping Use: Never used   Substance and Sexual Activity    Alcohol use: Yes     Alcohol/week: 1.0 - 2.0 standard drink     Types: 1 - 2 Standard drinks or equivalent per week     Comment: SOCIALLY    Drug use: No    Sexual activity: Defer        Medications:     Current Outpatient Medications:     cyclobenzaprine (FLEXERIL) 10 MG tablet, Take 1 tablet by mouth 3 (Three) Times a Day As Needed for Muscle Spasms., Disp: 20 tablet, Rfl: 0    traMADol (ULTRAM) 50 MG tablet, Take 1 tablet by mouth Every 8 (Eight) Hours As Needed for Moderate Pain or Severe Pain., Disp: 10 tablet, Rfl: 0        Physical Exam:     Vitals:    08/23/23 1308   BP: 128/80   Pulse: 64   Temp: 98 øF (36.7 øC)   TempSrc: Infrared   Weight: 67.6 kg (149 lb)   Height: 167.6 cm (66\")   PainSc:   5   PainLoc: Back        General: Alert and oriented, No acute distress.   HEENT: Normocephalic, atraumatic.   Cardiovascular: No gross edema  Respiratory: Respirations are non-labored    Cervical Spine:   No masses or atrophy  Range of motion - Flexion normal. Extension normal. Lateral rotation normal.   Palpation - nontender   Spurling's - negative     Thoracic Spine:   Inspection: no gross abnormality  Paraspinal muscle palpation: nontender  Spinous process palpation: nontender    Lumbar Spine:   No masses or atrophy  Range of motion - Flexion normal. Extension normal. Right Lat Bending normal. Left Lat Bending normal  Facet Loading: Positive left  Facet Palpation -tender left  PSIS tenderness - Negative bilaterally  Kwasi's/MALAIKA/Thigh thrust - Negative bilaterally  Straight leg raise: Positive left  Slump test: Positive left    Motor Exam:    Strength: Rate on 1-5 scale Right Left    C5-Elbow " flexion, Deltoid 5 5    C6-Wrist extension 5 5    C7- Elbow / finger extension 5 5    C8- Finger flexion 5 5    T1- Intrinsics hand 5 5    Strength: Rate on 1-5 scale Right Left    L1/2- hip flexion 5 5    L3- knee extension 5 5    L4- ankle dorsiflexion 5 5    L5- great toe extension 5 5    S1- ankle plantarflexion 5 5    Sensory Exam: Full and equal sensation to light touch throughout.    DTR's Right Left    Biceps (C5) 2+ 2+    Brachioradialis (C6) 2+ 2+    Triceps (C7) 2+ 2+    Knee (L2-4) 2+ 2+    Ankle (S1) 2+ 2+    Neurologic: Cranial Nerves II-XII are grossly intact.   Samson's -   Clonus -negative bilaterally    Psychiatric: Cooperative.   Gait: Normal   Assistive Devices: None    Imaging Studies:   Results for orders placed during the hospital encounter of 08/16/23    MRI Lumbar Spine Without Contrast    Narrative  DATE OF EXAM: 8/16/2023 6:15 PM    PROCEDURE: MRI LUMBAR SPINE WO CONTRAST-    INDICATIONS: Low back pain, spondyloarthropathy suspected, xray done;  M54.50-Low back pain, unspecified; M43.16-Spondylolisthesis, lumbar  region    COMPARISON: No comparisons available.    TECHNIQUE: Routine magnetic resonance imaging of the lumbar spine was  performed without the administration of contrast.    FINDINGS:  Marrow signal is preserved without evidence of fracture or suspicious  marrow replacing lesion. There is mild grade 1 anterolisthesis of L4 on  L5. The conus medullaris and cauda equina nerve roots appear  satisfactory. The paraspinal soft tissues demonstrate no acute or  suspicious findings. Multilevel spondylosis is apparent, with areas of  involvement noted including    L1-2, no significant spinal canal or neuroforaminal impingement.    L2-3, no significant spinal canal or neuroforaminal impingement.    L3-4, minimal disc bulge and facet arthropathy. There is mild spinal  canal and bilateral neuroforaminal narrowing.    L4-5, disc bulge, facet arthropathy and ligamentum flavum  thickening.  There is evidence of an intracanalicular facet synovial cyst on the left  which also severely encroaches upon the left subarticular recess, likely  contacting the adjacent traversing left L5 nerve root. There is also  moderate spinal canal narrowing and moderate bilateral neural foraminal  stenosis, fairly symmetric.    L5-S1, minimal disc bulge and facet arthropathy. The spinal canal is  patent. There is some very minimal narrowing of the right neural  foramen.    Impression  Multilevel lumbar spondylosis is present, most severe at L4-5. This  level demonstrates a small intracanalicular facet synovial cyst on the  left as well as ligamentum flavum thickening, disc bulging and facet  arthropathy. There is associated moderate spinal canal narrowing and  moderate bilateral neuroforaminal stenosis. The facet synovial cyst  encroaches upon the left subarticular recess and likely contacts the  adjacent traversing left L5 nerve root.      This report was finalized on 8/18/2023 9:41 AM by Trino Martínez.      Impression & Plan:   08/23/2023: Samanta Mera is a 63 y.o. female with past medical history significant for seasonal allergies, CELESTINO, hysterectomy who presents to the pain clinic for evaluation and treatment of chronic left-sided low back pain with radiation to left lower extremity.  I personally reviewed and interpreted her lumbar MRI dated August 16, 2023: Mild facet spondylosis at L4/5 and L5/S1; grade 1 anterolisthesis of L4 on L5; left L4/5 juxta facet cyst encroaching upon the lateral recess and displacing the descending L5 nerve; LFH/facet cyst causing moderate canal stenosis at L4/5 as well.  Clinical examination consistent with a left L5 radiculopathy secondary to juxta facet cyst.  I will obtain a lumbar x-ray with flexion-extension to determine stability of the L4 on 5 anterolisthesis.  We discussed epidural steroid injection as well as facet cyst aspiration to improve pain.  If greater than 50%  relief for at least 2-3 months can consider repeat as needed every 3 to 4 months.  I had an in-depth discussion with the patient regarding the risks of the procedure including bleeding, infection, damage to surrounding structures, paralysis.  We discussed the potential adverse effects of corticosteroid injection including flushing of the face, lipodystrophy, skin discoloration, elevated blood glucose, increased blood pressure.  Risks of frequent steroid administration include weight gain, hormonal changes, mood changes, osteoporosis.    Patient has an appointment with Dr. Farmer 9/13/2023.  We will make efforts to have her procedure performed in time for him to have actionable information.      1. Lumbar radiculopathy    2. Cyst of lumbar facet joint        PLAN:  1. Medication Recommendations: Recommend Voltaren topical, NSAIDs, Tylenol.  Can trial turmeric 500 mg twice daily if NSAID contraindicated.    2. Physical Therapy: Continue HEP    3. Psychological: defer    4. Complementary and alternative (CAM) Therapies:     5. Labs: None indicated     6. Imaging: MRI independently interpreted and reviewed with patient    7. Interventions: Schedule left L4/5 facet cyst aspiration (91808) and left L5/S1 transforaminal epidural steroid injection (47836)    8. Referrals: Pending neurosurgical evaluation with Dr. Farmer    9. Records requested: n/a    10. Lifestyle goals:    Follow-up 1 month after injection      [facility]  Sergio Jama MD

## 2023-08-24 ENCOUNTER — TELEPHONE (OUTPATIENT)
Dept: PAIN MEDICINE | Facility: CLINIC | Age: 63
End: 2023-08-24

## 2023-08-24 ENCOUNTER — DOCUMENTATION (OUTPATIENT)
Dept: PAIN MEDICINE | Facility: CLINIC | Age: 63
End: 2023-08-24

## 2023-08-24 ENCOUNTER — OUTSIDE FACILITY SERVICE (OUTPATIENT)
Dept: PAIN MEDICINE | Facility: CLINIC | Age: 63
End: 2023-08-24
Payer: COMMERCIAL

## 2023-08-24 NOTE — TELEPHONE ENCOUNTER
Hub staff attempted to follow warm transfer process and was unsuccessful     Caller: Samanta Mera    Relationship to patient: Self    Best call back number: [phone] (home)       Patient is needing: PATIENT WAS TOLD SHE WOULD BE CALLED TO VERIFY IF PRIOR AUTH WAS APPROVED. SHE IS TO BE AT THE SURGERY CENTER AT NOON. PLEASE CALL ASAP

## 2023-08-24 NOTE — PROGRESS NOTES
[facility]  [address] [place] [address]    [facility]  [address] [place] [address]           PROCEDURE: Fluoroscopically-guided left L4/5 lumbar facet cyst aspiration, left L5/S1 transforaminal epidural steroid injection     PRE-OP DIAGNOSIS: Lumbar radiculopathy secondary to lumbar facet cyst  POST-OP DIAGNOSIS: Same     BLOOD THINNERS (ANTIPLATELETS/ANTICOAGULANTS): Were discussed with the patient and OCTAVIO Guidelines were followed.      CONSENT: Risks, benefits and options were explained to the patient, all questions were answered and written informed consent was obtained.      ANESTHESIA: Moderate sedation was required to maintain comfort, safety, and cooperation during the procedure.  Patient received 2mg IV Versed and 0mcg IV fentanyl.  Independent observation and monitoring was performed by Neo Lopez RN.  The patient's level of consciousness and physiologic status was continually monitored with pulse oximetry, EKG from 12:45 to 13:16.  There were no complications or adverse events during sedation.  After the sedation patient was taken to the recovery area.     PROCEDURE NOTE: A pre-procedural time out was performed to confirm the correct patient, procedure, side, and site. Standard ASA monitors were applied and oxygen via nasal cannula was provided. All proceduralists donned sterile gloves with masks and surgical hats. The patient was placed prone with pillow under the abdomen and all pressure points padded. The patient's lumbar spine was prepped in standard fashion using [Chlorhexidine] and draped with sterile towels.  The rtarget facet was identified in AP fluoroscopy.  The overlying skin was anesthetized 1% lidocaine; then, a 25-gauge 3.5 inch spinal needle was directed under AP and lateral intermittent fluoroscopy down to the posterior entry of the left L4/5 facet.  The tract was then anesthetized with 1% lidocaine and  the needle was withdrawn.  Then, a 18-gauge 3.5 inch spinal needle was advanced in AP and lateral intermittent fluoroscopy down to the posterior of the target facet.  Then, and lateral fluoroscopy, the needle was advanced into the facet.  Aspiration was positive for synovial fluid.  Then, 5 cc of 180 Omnipaque was injected.  There was a loss of resistance moment and contrast was identified flowing cephalad caudally within the epidural space indicating rupture of the cyst.  Then, the overlying skin and subcutaneous tissue over the left L5/S1 foramen was anesthetized with 1% lidocaine. A 22 gauge 3.5 inch spinal needle with bent tip was incrementally advanced using intermittent fluoroscopy to the 6 o'clock position of the target pedicle in the mid-neuroforamen using oblique, AP and lateral intermittent fluoroscopy. After negative aspiration of blood and cerebrospinal fluid, needle placement was confirmed with 1 ml of omnipaque 180 mgI/ml contrast using AP fluoroscopic imaging. Imaging revealed a clear outline of the target spinal nerve with proximal spread of agent through the neuroforamen medially to the epidural space, without evidence of intravascular or intrathecal spread. After negative aspiration, a mixture containing dexamethasone 10 mg steroid and lidocaine 1% - 0.5 ml local anesthetic for a total volume of 1.5 ml was injected under direct visualization with fluoroscopy. The needle was flushed, removed and a bandage applied.     EBL: None     COMPLICATIONS: None     The patient was monitored for at least 30 minutes prior to discharge. Vital signs remained stable throughout the procedure and in the recovery area. There were no immediate complications and the patient tolerated the procedure well. Sensory and motor exam was unchanged from baseline. The patient received written discharge instructions prior to discharge.     FOLLOW UP: As scheduled     ADDITIONAL NOTES: []        [facility]       Sergio Jama MD

## 2023-08-29 DIAGNOSIS — G47.33 OSA (OBSTRUCTIVE SLEEP APNEA): Primary | ICD-10-CM

## 2023-08-31 ENCOUNTER — TELEPHONE (OUTPATIENT)
Dept: PAIN MEDICINE | Facility: CLINIC | Age: 63
End: 2023-08-31
Payer: COMMERCIAL

## 2023-08-31 NOTE — TELEPHONE ENCOUNTER
Spoke with the patient, she said she was having mild soreness in her back at the injection site, but has been having severe pain in one leg when she moves the wrong way.     Her follow up is on October 2nd. Do you want to see her sooner? [place] is leaving the country on [place] and will not return until September 30.    Please advise.

## 2023-09-05 NOTE — TELEPHONE ENCOUNTER
Left patient VM advising her that Dr. Jama is not able to see her sooner than her scheduled follow up.

## 2023-09-11 DIAGNOSIS — M54.16 LUMBAR BACK PAIN WITH RADICULOPATHY AFFECTING LEFT LOWER EXTREMITY: Primary | ICD-10-CM

## 2023-09-11 RX ORDER — METHYLPREDNISOLONE 4 MG/1
TABLET ORAL
Qty: 21 EACH | Refills: 0 | Status: SHIPPED | OUTPATIENT
Start: 2023-09-11 | End: 2023-09-13

## 2023-09-11 RX ORDER — METHOCARBAMOL 500 MG/1
500 TABLET, FILM COATED ORAL 3 TIMES DAILY PRN
Qty: 30 TABLET | Refills: 0 | Status: SHIPPED | OUTPATIENT
Start: 2023-09-11 | End: 2023-09-13

## 2023-09-13 ENCOUNTER — PREP FOR SURGERY (OUTPATIENT)
Dept: OTHER | Facility: HOSPITAL | Age: 63
End: 2023-09-13
Payer: COMMERCIAL

## 2023-09-13 ENCOUNTER — OFFICE VISIT (OUTPATIENT)
Dept: NEUROSURGERY | Facility: CLINIC | Age: 63
End: 2023-09-13
Payer: COMMERCIAL

## 2023-09-13 VITALS — BODY MASS INDEX: 23.59 KG/M2 | TEMPERATURE: 97.3 F | HEIGHT: 66 IN | WEIGHT: 146.8 LBS

## 2023-09-13 DIAGNOSIS — M71.38 SYNOVIAL CYST OF LUMBAR FACET JOINT: Primary | ICD-10-CM

## 2023-09-13 DIAGNOSIS — M54.16 LUMBAR RADICULOPATHY: ICD-10-CM

## 2023-09-13 DIAGNOSIS — M43.16 SPONDYLOLISTHESIS OF LUMBAR REGION: Primary | ICD-10-CM

## 2023-09-13 DIAGNOSIS — M71.30 SYNOVIAL CYST: ICD-10-CM

## 2023-09-13 PROCEDURE — 99204 OFFICE O/P NEW MOD 45 MIN: CPT | Performed by: NEUROLOGICAL SURGERY

## 2023-09-13 RX ORDER — CEFAZOLIN SODIUM 2 G/100ML
2 INJECTION, SOLUTION INTRAVENOUS ONCE
OUTPATIENT
Start: 2023-09-13 | End: 2023-09-13

## 2023-09-13 RX ORDER — FAMOTIDINE 20 MG/1
20 TABLET, FILM COATED ORAL
OUTPATIENT
Start: 2023-09-13

## 2023-09-13 RX ORDER — OXYCODONE HCL 10 MG/1
10 TABLET, FILM COATED, EXTENDED RELEASE ORAL ONCE
OUTPATIENT
Start: 2023-09-13 | End: 2023-09-13

## 2023-09-13 RX ORDER — ACETAMINOPHEN 500 MG
1000 TABLET ORAL ONCE
OUTPATIENT
Start: 2023-09-13 | End: 2023-09-13

## 2023-09-13 RX ORDER — CHLORHEXIDINE GLUCONATE 4 G/100ML
SOLUTION TOPICAL
Qty: 120 ML | Refills: 0 | Status: SHIPPED | OUTPATIENT
Start: 2023-09-13

## 2023-09-13 RX ORDER — IBUPROFEN 200 MG
200 TABLET ORAL EVERY 6 HOURS PRN
COMMUNITY

## 2023-09-13 RX ORDER — IBUPROFEN 800 MG/1
800 TABLET ORAL ONCE
OUTPATIENT
Start: 2023-09-13 | End: 2023-09-13

## 2023-09-13 NOTE — H&P
Patient: Samanta Mera  : 1960     Primary Care Provider: Gertrude Suero PA-C     Requesting Provider: As above           History     Chief Complaint: Left hip and leg pain with sensory alteration.     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  She denies any precipitating or inciting event.  She has not had back issues in the past.  Pain is worse with sitting or weightbearing.  She does better lying down.  Advil is somewhat helpful.  Flexeril made her very sleepy and was not helpful.  She has no right leg symptoms.  Symptoms extend into an approximately L5 distribution of her left leg.  Physical therapy has not been helpful.  On  Dr. Jama performed a synovial cyst aspiration at L4-5 as well as an L5-S1 epidural injection.  That helped for about 3 days and her pain is recurred.  She is struggling with her symptoms.     Review of Systems   Constitutional:  Negative for activity change, appetite change, chills, diaphoresis, fatigue, fever and unexpected weight change.   HENT:  Negative for congestion, dental problem, drooling, ear discharge, ear pain, facial swelling, hearing loss, mouth sores, nosebleeds, postnasal drip, rhinorrhea, sinus pressure, sinus pain, sneezing, sore throat, tinnitus, trouble swallowing and voice change.    Eyes:  Negative for photophobia, pain, discharge, redness, itching and visual disturbance.   Respiratory:  Negative for apnea, cough, choking, chest tightness, shortness of breath, wheezing and stridor.    Cardiovascular:  Negative for chest pain, palpitations and leg swelling.   Gastrointestinal:  Negative for abdominal distention, abdominal pain, anal bleeding, blood in stool, constipation, diarrhea, nausea, rectal pain and vomiting.   Endocrine: Negative for cold intolerance, heat intolerance, polydipsia, polyphagia and polyuria.   Genitourinary:  Negative for  decreased urine volume, difficulty urinating, dyspareunia, dysuria, enuresis, flank pain, frequency, genital sores, hematuria, menstrual problem, pelvic pain, urgency, vaginal bleeding, vaginal discharge and vaginal pain.   Musculoskeletal:  Positive for back pain. Negative for arthralgias, gait problem, joint swelling, myalgias, neck pain and neck stiffness.   Skin:  Negative for color change, pallor, rash and wound.   Allergic/Immunologic: Negative for environmental allergies, food allergies and immunocompromised state.   Neurological:  Negative for dizziness, tremors, seizures, syncope, facial asymmetry, speech difficulty, weakness, light-headedness, numbness and headaches.   Hematological:  Negative for adenopathy. Does not bruise/bleed easily.   Psychiatric/Behavioral:  Negative for agitation, behavioral problems, confusion, decreased concentration, dysphoric mood, hallucinations, self-injury, sleep disturbance and suicidal ideas. The patient is not nervous/anxious and is not hyperactive.       The patient's past medical history, past surgical history, family history, and social history have been reviewed at length in the electronic medical record.  Past Medical History:   Diagnosis Date    Drug therapy     Hx of radiation therapy     Low back pain     Lumbosacral disc disease     left side lower back    Malignant neoplasm of right breast in female, estrogen receptor positive 2018     Past Surgical History:   Procedure Laterality Date    BREAST BIOPSY      BREAST LUMPECTOMY Right      SECTION      X 3    COLONOSCOPY  2019    HYSTERECTOMY      age 55, full    LUMBAR SYNOVIAL CYST REMOVAL  aug 24    facet cyst/steroid injection    OOPHORECTOMY       Family History   Problem Relation Age of Onset    Hypertension Mother     Cancer Mother         skin    Cancer Father         lung    Diabetes Daughter     Hypertension Sister     Hypertension Sister     Diabetes Sister         type 2     "Diabetes Daughter         type 1    Breast cancer Neg Hx     Ovarian cancer Neg Hx      Social History     Socioeconomic History    Marital status:    Tobacco Use    Smoking status: Never    Smokeless tobacco: Never   Vaping Use    Vaping Use: Never used   Substance and Sexual Activity    Alcohol use: Yes     Alcohol/week: 1.0 - 2.0 standard drink     Types: 1 - 2 Glasses of wine per week     Comment: SOCIALLY    Drug use: No    Sexual activity: Yes     Partners: Male     Birth control/protection: Hysterectomy           Allergies   Allergen Reactions    No Known Drug Allergy        Current Outpatient Medications on File Prior to Visit   Medication Sig Dispense Refill    ibuprofen (ADVIL,MOTRIN) 200 MG tablet Take 1 tablet by mouth Every 6 (Six) Hours As Needed for Mild Pain.      [DISCONTINUED] methocarbamol (ROBAXIN) 500 MG tablet Take 1 tablet by mouth 3 (Three) Times a Day As Needed for Muscle Spasms. 30 tablet 0    [DISCONTINUED] methylPREDNISolone (MEDROL) 4 MG dose pack Take as directed on package instructions. 21 each 0    [DISCONTINUED] traMADol (ULTRAM) 50 MG tablet Take 1 tablet by mouth Every 8 (Eight) Hours As Needed for Moderate Pain or Severe Pain. 10 tablet 0     No current facility-administered medications on file prior to visit.             Physical Exam:   Temp 97.3 °F (36.3 °C) (Infrared)   Ht 167.6 cm (66\")   Wt 66.6 kg (146 lb 12.8 oz)   BMI 23.69 kg/m²   CONSTITUTIONAL: Patient is well-nourished, pleasant and appears stated age.  MUSCULOSKELETAL:  Straight leg raising is negative.  Kwasi's Sign is negative.  ROM in the low back is normal.  Tenderness in the back to palpation is not observed.  NEUROLOGICAL:  Orientation, memory, attention span, language function, and cognition have been examined and are intact.  Strength is intact in the lower extremities to direct testing.  Muscle tone is normal throughout.  Station and gait are normal.  Sensation is intact to light touch testing " throughout.  Deep tendon reflexes are 1+ and symmetrical.  Coordination is intact.        Medical Decision Making     Data Review:   (All imaging studies were personally reviewed unless stated otherwise)  MRI of the lumbar spine dated 8/16/2023 demonstrates a grade 1 listhesis with a large left synovial cyst at the L4-5 level.     Plain flexion-extension films dated 8/3/2023 of the lumbar spine do not demonstrate overt translational instability.     Diagnosis:   1.  Left L4-5 synovial cyst with radiculopathy.  2.  L4-5 spondylolisthesis.     Treatment Options:   The patient is struggling and needs surgical intervention.  One could pursue simple cyst removal.  However these cysts tend to be markers for instability.  Her listhesis would support that.  Also drilling off facet joint to remove the cyst could result in even further instability.  I think when all is said and done she is best served by pursuing lumbar decompression with fusion and stabilization at the L4-5 level.  The nature of the procedure as well as the potential risks, complications, limitations, and alternatives to the procedure were discussed at length with the patient and the patient has agreed to proceed with surgery.          Diagnosis Plan   1. Spondylolisthesis of lumbar region          2. Lumbar radiculopathy          3. Synovial cyst

## 2023-09-13 NOTE — PROGRESS NOTES
Patient: Samanta Mera  : 1960    Primary Care Provider: Gertrude Suero PA-C    Requesting Provider: As above        History    Chief Complaint: Left hip and leg pain with sensory alteration.    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  She denies any precipitating or inciting event.  She has not had back issues in the past.  Pain is worse with sitting or weightbearing.  She does better lying down.  Advil is somewhat helpful.  Flexeril made her very sleepy and was not helpful.  She has no right leg symptoms.  Symptoms extend into an approximately L5 distribution of her left leg.  Physical therapy has not been helpful.  On  Dr. Jama performed a synovial cyst aspiration at L4-5 as well as an L5-S1 epidural injection.  That helped for about 3 days and her pain is recurred.  She is struggling with her symptoms.    Review of Systems   Constitutional:  Negative for activity change, appetite change, chills, diaphoresis, fatigue, fever and unexpected weight change.   HENT:  Negative for congestion, dental problem, drooling, ear discharge, ear pain, facial swelling, hearing loss, mouth sores, nosebleeds, postnasal drip, rhinorrhea, sinus pressure, sinus pain, sneezing, sore throat, tinnitus, trouble swallowing and voice change.    Eyes:  Negative for photophobia, pain, discharge, redness, itching and visual disturbance.   Respiratory:  Negative for apnea, cough, choking, chest tightness, shortness of breath, wheezing and stridor.    Cardiovascular:  Negative for chest pain, palpitations and leg swelling.   Gastrointestinal:  Negative for abdominal distention, abdominal pain, anal bleeding, blood in stool, constipation, diarrhea, nausea, rectal pain and vomiting.   Endocrine: Negative for cold intolerance, heat intolerance, polydipsia, polyphagia and polyuria.   Genitourinary:  Negative for decreased  "urine volume, difficulty urinating, dyspareunia, dysuria, enuresis, flank pain, frequency, genital sores, hematuria, menstrual problem, pelvic pain, urgency, vaginal bleeding, vaginal discharge and vaginal pain.   Musculoskeletal:  Positive for back pain. Negative for arthralgias, gait problem, joint swelling, myalgias, neck pain and neck stiffness.   Skin:  Negative for color change, pallor, rash and wound.   Allergic/Immunologic: Negative for environmental allergies, food allergies and immunocompromised state.   Neurological:  Negative for dizziness, tremors, seizures, syncope, facial asymmetry, speech difficulty, weakness, light-headedness, numbness and headaches.   Hematological:  Negative for adenopathy. Does not bruise/bleed easily.   Psychiatric/Behavioral:  Negative for agitation, behavioral problems, confusion, decreased concentration, dysphoric mood, hallucinations, self-injury, sleep disturbance and suicidal ideas. The patient is not nervous/anxious and is not hyperactive.      The patient's past medical history, past surgical history, family history, and social history have been reviewed at length in the electronic medical record.      Physical Exam:   Temp 97.3 °F (36.3 °C) (Infrared)   Ht 167.6 cm (66\")   Wt 66.6 kg (146 lb 12.8 oz)   BMI 23.69 kg/m²   CONSTITUTIONAL: Patient is well-nourished, pleasant and appears stated age.  MUSCULOSKELETAL:  Straight leg raising is negative.  Kwasi's Sign is negative.  ROM in the low back is normal.  Tenderness in the back to palpation is not observed.  NEUROLOGICAL:  Orientation, memory, attention span, language function, and cognition have been examined and are intact.  Strength is intact in the lower extremities to direct testing.  Muscle tone is normal throughout.  Station and gait are normal.  Sensation is intact to light touch testing throughout.  Deep tendon reflexes are 1+ and symmetrical.  Coordination is intact.      Medical Decision Making    Data " Review:   (All imaging studies were personally reviewed unless stated otherwise)  MRI of the lumbar spine dated 8/16/2023 demonstrates a grade 1 listhesis with a large left synovial cyst at the L4-5 level.    Plain flexion-extension films dated 8/3/2023 of the lumbar spine do not demonstrate overt translational instability.    Diagnosis:   1.  Left L4-5 synovial cyst with radiculopathy.  2.  L4-5 spondylolisthesis.    Treatment Options:   The patient is struggling and needs surgical intervention.  One could pursue simple cyst removal.  However these cysts tend to be markers for instability.  Her listhesis would support that.  Also drilling off facet joint to remove the cyst could result in even further instability.  I think when all is said and done she is best served by pursuing lumbar decompression with fusion and stabilization at the L4-5 level.  The nature of the procedure as well as the potential risks, complications, limitations, and alternatives to the procedure were discussed at length with the patient and the patient has agreed to proceed with surgery.       Diagnosis Plan   1. Spondylolisthesis of lumbar region        2. Lumbar radiculopathy        3. Synovial cyst            Scribed for Jose Farmer MD by MELINA Tomlin 9/13/2023 10:46 EDT      I, Dr. Farmer, personally performed the services described in the documentation, as scribed in my presence, and it is both accurate and complete.

## 2023-09-14 ENCOUNTER — TELEPHONE (OUTPATIENT)
Dept: NEUROSURGERY | Facility: CLINIC | Age: 63
End: 2023-09-14
Payer: COMMERCIAL

## 2023-09-14 NOTE — TELEPHONE ENCOUNTER
Provider:  Darian  Surgery/Procedure:  LUMBAR FUSION DECOMPRESSON WITH PEDICLE SCREWS L4-5   Surgery/Procedure Date: 10-26-23  Last visit: 9-13-23    Next visit: TBD     Reason for call: Pharmacy faxed over a PA for the nasal ointment.  I have called and spoke to Jeffrey and she is going to have the patient get both the ointment and the hibiclens OTC.  Insurance will not pay for either.

## 2023-10-19 ENCOUNTER — PRE-ADMISSION TESTING (OUTPATIENT)
Dept: PREADMISSION TESTING | Facility: HOSPITAL | Age: 63
End: 2023-10-19
Payer: COMMERCIAL

## 2023-10-19 VITALS — WEIGHT: 148.81 LBS | BODY MASS INDEX: 23.92 KG/M2 | HEIGHT: 66 IN

## 2023-10-19 DIAGNOSIS — M71.38 SYNOVIAL CYST OF LUMBAR FACET JOINT: ICD-10-CM

## 2023-10-19 LAB
DEPRECATED RDW RBC AUTO: 42.7 FL (ref 37–54)
ERYTHROCYTE [DISTWIDTH] IN BLOOD BY AUTOMATED COUNT: 12.7 % (ref 12.3–15.4)
HBA1C MFR BLD: 5.5 % (ref 4.8–5.6)
HCT VFR BLD AUTO: 36.2 % (ref 34–46.6)
HGB BLD-MCNC: 11.7 G/DL (ref 12–15.9)
MCH RBC QN AUTO: 29.9 PG (ref 26.6–33)
MCHC RBC AUTO-ENTMCNC: 32.3 G/DL (ref 31.5–35.7)
MCV RBC AUTO: 92.6 FL (ref 79–97)
PLATELET # BLD AUTO: 216 10*3/MM3 (ref 140–450)
PMV BLD AUTO: 11.1 FL (ref 6–12)
RBC # BLD AUTO: 3.91 10*6/MM3 (ref 3.77–5.28)
WBC NRBC COR # BLD: 5.24 10*3/MM3 (ref 3.4–10.8)

## 2023-10-19 PROCEDURE — 83036 HEMOGLOBIN GLYCOSYLATED A1C: CPT

## 2023-10-19 PROCEDURE — 87081 CULTURE SCREEN ONLY: CPT

## 2023-10-19 PROCEDURE — 85027 COMPLETE CBC AUTOMATED: CPT

## 2023-10-19 PROCEDURE — 36415 COLL VENOUS BLD VENIPUNCTURE: CPT

## 2023-10-19 NOTE — PAT
Patient to apply Chlorhexadine wipes  to surgical area (as instructed) the night before procedure and the AM of procedure. Wipes provided.    Patient instructed to drink 20 ounces of Gatorade or Gatorlyte (if diabetic) and it needs to be completed 1 hour (for Main OR patients) or 2 hours (scheduled  section & [facility] patients) before given arrival time for procedure (NO RED Gatorade and NO Gatorade Zero).    Patient verbalized understanding.    An arrival time for procedure was not provided during PAT visit. If patient had any questions or concerns about their arrival time, they were instructed to contact their surgeon/physician.  Additionally, if the patient referred to an arrival time that was acquired from their my chart account, patient was encouraged to verify that time with their surgeon/physician. Arrival times are NOT provided in Pre Admission Testing Department.    Patient viewed general PAT education video as instructed in their preoperative information received from their surgeon.  Patient stated the general PAT education video was viewed in its entirety and survey completed.  Copies of [facility] general education handouts (Incentive Spirometry, Meds to Beds Program, Patient Belongings, Pre-op skin preparation instructions, Blood Glucose testing, Visitor policy, Surgery FAQ, Code H) distributed to patient if not printed. Education related to the PAT pass and skin preparation for surgery (if applicable) completed in PAT as a reinforcement to PAT education video. Patient instructed to return PAT pass provided today as well as completed skin preparation sheet (if applicable) on the day of procedure.     Additionally if patient had not viewed video yet but intended to view it at home or in our waiting area, then referred them to the handout with QR code/link provided during PAT visit.  Instructed patient to complete survey after viewing the video in its entirety.  Encouraged patient/family to read PAT  general education handouts thoroughly and notify PAT staff with any questions or concerns. Patient verbalized understanding of all information and priority content.

## 2023-10-20 LAB — MRSA SPEC QL CULT: NORMAL

## 2023-10-25 ENCOUNTER — ANESTHESIA EVENT (OUTPATIENT)
Dept: PERIOP | Facility: HOSPITAL | Age: 63
End: 2023-10-25
Payer: COMMERCIAL

## 2023-10-25 RX ORDER — SODIUM CHLORIDE 9 MG/ML
40 INJECTION, SOLUTION INTRAVENOUS AS NEEDED
Status: CANCELLED | OUTPATIENT
Start: 2023-10-25

## 2023-10-25 RX ORDER — SODIUM CHLORIDE 0.9 % (FLUSH) 0.9 %
10 SYRINGE (ML) INJECTION EVERY 12 HOURS SCHEDULED
Status: CANCELLED | OUTPATIENT
Start: 2023-10-25

## 2023-10-25 RX ORDER — SODIUM CHLORIDE 0.9 % (FLUSH) 0.9 %
10 SYRINGE (ML) INJECTION AS NEEDED
Status: CANCELLED | OUTPATIENT
Start: 2023-10-25

## 2023-10-26 ENCOUNTER — HOSPITAL ENCOUNTER (INPATIENT)
Facility: HOSPITAL | Age: 63
LOS: 2 days | Discharge: HOME | End: 2023-10-28
Attending: NEUROLOGICAL SURGERY | Admitting: NEUROLOGICAL SURGERY
Payer: COMMERCIAL

## 2023-10-26 ENCOUNTER — ANESTHESIA (OUTPATIENT)
Dept: PERIOP | Facility: HOSPITAL | Age: 63
End: 2023-10-26
Payer: COMMERCIAL

## 2023-10-26 ENCOUNTER — APPOINTMENT (OUTPATIENT)
Dept: GENERAL RADIOLOGY | Facility: HOSPITAL | Age: 63
End: 2023-10-26
Payer: COMMERCIAL

## 2023-10-26 DIAGNOSIS — M71.38 SYNOVIAL CYST OF LUMBAR FACET JOINT: ICD-10-CM

## 2023-10-26 PROCEDURE — 25010000002 SUGAMMADEX 200 MG/2ML SOLUTION: Performed by: NURSE ANESTHETIST, CERTIFIED REGISTERED

## 2023-10-26 PROCEDURE — 25810000003 LACTATED RINGERS PER 1000 ML: Performed by: ANESTHESIOLOGY

## 2023-10-26 PROCEDURE — C1713 ANCHOR/SCREW BN/BN,TIS/BN: HCPCS | Performed by: NEUROLOGICAL SURGERY

## 2023-10-26 PROCEDURE — 25010000002 DEXAMETHASONE PER 1 MG: Performed by: NURSE ANESTHETIST, CERTIFIED REGISTERED

## 2023-10-26 PROCEDURE — 25010000002 ONDANSETRON PER 1 MG: Performed by: NURSE ANESTHETIST, CERTIFIED REGISTERED

## 2023-10-26 PROCEDURE — 25010000002 PROPOFOL 10 MG/ML EMULSION: Performed by: NURSE ANESTHETIST, CERTIFIED REGISTERED

## 2023-10-26 PROCEDURE — 61783 SCAN PROC SPINAL: CPT | Performed by: NEUROLOGICAL SURGERY

## 2023-10-26 PROCEDURE — 25810000003 LACTATED RINGERS PER 1000 ML: Performed by: NEUROLOGICAL SURGERY

## 2023-10-26 PROCEDURE — 22840 INSERT SPINE FIXATION DEVICE: CPT | Performed by: NEUROLOGICAL SURGERY

## 2023-10-26 PROCEDURE — S0260 H&P FOR SURGERY: HCPCS | Performed by: PHYSICIAN ASSISTANT

## 2023-10-26 PROCEDURE — 25810000003 SODIUM CHLORIDE PER 500 ML: Performed by: NEUROLOGICAL SURGERY

## 2023-10-26 PROCEDURE — 88304 TISSUE EXAM BY PATHOLOGIST: CPT | Performed by: NEUROLOGICAL SURGERY

## 2023-10-26 PROCEDURE — 25010000002 CEFAZOLIN PER 500 MG: Performed by: NEUROLOGICAL SURGERY

## 2023-10-26 PROCEDURE — 22853 INSJ BIOMECHANICAL DEVICE: CPT | Performed by: NEUROLOGICAL SURGERY

## 2023-10-26 PROCEDURE — 25010000002 FENTANYL CITRATE (PF) 100 MCG/2ML SOLUTION: Performed by: NURSE ANESTHETIST, CERTIFIED REGISTERED

## 2023-10-26 PROCEDURE — 25010000002 HYDROMORPHONE 1 MG/ML SOLUTION

## 2023-10-26 PROCEDURE — 0SG00AJ FUSION OF LUMBAR VERTEBRAL JOINT WITH INTERBODY FUSION DEVICE, POSTERIOR APPROACH, ANTERIOR COLUMN, OPEN APPROACH: ICD-10-PCS | Performed by: NEUROLOGICAL SURGERY

## 2023-10-26 PROCEDURE — 63052 LAM FACETC/FRMT ARTHRD LUM 1: CPT | Performed by: NEUROLOGICAL SURGERY

## 2023-10-26 PROCEDURE — 22840 INSERT SPINE FIXATION DEVICE: CPT

## 2023-10-26 PROCEDURE — 0SB20ZZ EXCISION OF LUMBAR VERTEBRAL DISC, OPEN APPROACH: ICD-10-PCS | Performed by: NEUROLOGICAL SURGERY

## 2023-10-26 PROCEDURE — 25010000002 PHENYLEPHRINE 10 MG/ML SOLUTION 1 ML VIAL: Performed by: NURSE ANESTHETIST, CERTIFIED REGISTERED

## 2023-10-26 PROCEDURE — 76000 FLUOROSCOPY <1 HR PHYS/QHP: CPT

## 2023-10-26 PROCEDURE — 22630 ARTHRD PST TQ 1NTRSPC LUM: CPT | Performed by: NEUROLOGICAL SURGERY

## 2023-10-26 PROCEDURE — 25810000003 SODIUM CHLORIDE 0.9 % SOLUTION 250 ML FLEX CONT: Performed by: NURSE ANESTHETIST, CERTIFIED REGISTERED

## 2023-10-26 PROCEDURE — 25010000002 FENTANYL CITRATE (PF) 50 MCG/ML SOLUTION

## 2023-10-26 PROCEDURE — 25810000003 LACTATED RINGERS PER 1000 ML: Performed by: NURSE ANESTHETIST, CERTIFIED REGISTERED

## 2023-10-26 PROCEDURE — 63052 LAM FACETC/FRMT ARTHRD LUM 1: CPT

## 2023-10-26 PROCEDURE — 01NB0ZZ RELEASE LUMBAR NERVE, OPEN APPROACH: ICD-10-PCS | Performed by: NEUROLOGICAL SURGERY

## 2023-10-26 PROCEDURE — 25010000002 VANCOMYCIN 1 G RECONSTITUTED SOLUTION: Performed by: NEUROLOGICAL SURGERY

## 2023-10-26 PROCEDURE — 22630 ARTHRD PST TQ 1NTRSPC LUM: CPT

## 2023-10-26 PROCEDURE — 22853 INSJ BIOMECHANICAL DEVICE: CPT

## 2023-10-26 DEVICE — SCREW 55840016545 5.5 CNMAS 6.5X45 CC
Type: IMPLANTABLE DEVICE | Site: SPINE LUMBAR | Status: FUNCTIONAL
Brand: CD HORIZON® SPINAL SYSTEM

## 2023-10-26 DEVICE — DBM T44145 5CC ORTHOBLEND SMALL DEFGRAFT
Type: IMPLANTABLE DEVICE | Site: SPINE LUMBAR | Status: FUNCTIONAL
Brand: GRAFTON®AND GRAFTON PLUS®DEMINERALIZED BONE MATRIX (DBM)

## 2023-10-26 DEVICE — FLOSEAL WITH RECOTHROM - 10ML.
Type: IMPLANTABLE DEVICE | Site: SPINE LUMBAR | Status: FUNCTIONAL
Brand: FLOSEAL HEMOSTATIC MATRIX

## 2023-10-26 DEVICE — SPACR TLIF/DLIF ADAPTIX 24X11MM: Type: IMPLANTABLE DEVICE | Site: SPINE LUMBAR | Status: FUNCTIONAL

## 2023-10-26 DEVICE — HEMOST ABS SURGIFOAM SZ100 8X12 10MM: Type: IMPLANTABLE DEVICE | Site: SPINE LUMBAR | Status: FUNCTIONAL

## 2023-10-26 RX ORDER — MIDAZOLAM HYDROCHLORIDE 1 MG/ML
1 INJECTION INTRAMUSCULAR; INTRAVENOUS
Status: DISCONTINUED | OUTPATIENT
Start: 2023-10-26 | End: 2023-10-26 | Stop reason: HOSPADM

## 2023-10-26 RX ORDER — LIDOCAINE HYDROCHLORIDE 10 MG/ML
0.5 INJECTION, SOLUTION EPIDURAL; INFILTRATION; INTRACAUDAL; PERINEURAL ONCE AS NEEDED
Status: DISCONTINUED | OUTPATIENT
Start: 2023-10-26 | End: 2023-10-26 | Stop reason: HOSPADM

## 2023-10-26 RX ORDER — OXYCODONE AND ACETAMINOPHEN 7.5; 325 MG/1; MG/1
1 TABLET ORAL EVERY 4 HOURS PRN
Status: DISCONTINUED | OUTPATIENT
Start: 2023-10-26 | End: 2023-10-28 | Stop reason: HOSPADM

## 2023-10-26 RX ORDER — SODIUM CHLORIDE 9 MG/ML
INJECTION, SOLUTION INTRAVENOUS AS NEEDED
Status: DISCONTINUED | OUTPATIENT
Start: 2023-10-26 | End: 2023-10-26 | Stop reason: HOSPADM

## 2023-10-26 RX ORDER — IBUPROFEN 800 MG/1
800 TABLET ORAL ONCE
Status: COMPLETED | OUTPATIENT
Start: 2023-10-26 | End: 2023-10-26

## 2023-10-26 RX ORDER — DIPHENHYDRAMINE HCL 25 MG
25 CAPSULE ORAL NIGHTLY PRN
Status: DISCONTINUED | OUTPATIENT
Start: 2023-10-26 | End: 2023-10-28 | Stop reason: HOSPADM

## 2023-10-26 RX ORDER — NALOXONE HCL 0.4 MG/ML
0.4 VIAL (ML) INJECTION
Status: DISCONTINUED | OUTPATIENT
Start: 2023-10-26 | End: 2023-10-28 | Stop reason: HOSPADM

## 2023-10-26 RX ORDER — FAMOTIDINE 10 MG/ML
20 INJECTION, SOLUTION INTRAVENOUS EVERY 12 HOURS SCHEDULED
Status: DISCONTINUED | OUTPATIENT
Start: 2023-10-26 | End: 2023-10-28 | Stop reason: HOSPADM

## 2023-10-26 RX ORDER — FAMOTIDINE 20 MG/1
20 TABLET, FILM COATED ORAL EVERY 12 HOURS SCHEDULED
Status: DISCONTINUED | OUTPATIENT
Start: 2023-10-26 | End: 2023-10-28 | Stop reason: HOSPADM

## 2023-10-26 RX ORDER — IBUPROFEN 600 MG/1
600 TABLET ORAL EVERY 8 HOURS SCHEDULED
Status: DISCONTINUED | OUTPATIENT
Start: 2023-10-26 | End: 2023-10-28 | Stop reason: HOSPADM

## 2023-10-26 RX ORDER — MORPHINE SULFATE 2 MG/ML
2 INJECTION, SOLUTION INTRAMUSCULAR; INTRAVENOUS EVERY 4 HOURS PRN
Status: DISCONTINUED | OUTPATIENT
Start: 2023-10-26 | End: 2023-10-28 | Stop reason: HOSPADM

## 2023-10-26 RX ORDER — PROMETHAZINE HYDROCHLORIDE 12.5 MG/1
12.5 SUPPOSITORY RECTAL EVERY 6 HOURS PRN
Status: DISCONTINUED | OUTPATIENT
Start: 2023-10-26 | End: 2023-10-28 | Stop reason: HOSPADM

## 2023-10-26 RX ORDER — BUPIVACAINE HYDROCHLORIDE AND EPINEPHRINE 2.5; 5 MG/ML; UG/ML
INJECTION, SOLUTION EPIDURAL; INFILTRATION; INTRACAUDAL; PERINEURAL AS NEEDED
Status: DISCONTINUED | OUTPATIENT
Start: 2023-10-26 | End: 2023-10-26 | Stop reason: HOSPADM

## 2023-10-26 RX ORDER — AMOXICILLIN 250 MG
2 CAPSULE ORAL NIGHTLY PRN
Status: DISCONTINUED | OUTPATIENT
Start: 2023-10-26 | End: 2023-10-28 | Stop reason: HOSPADM

## 2023-10-26 RX ORDER — PHENYLEPHRINE HCL IN 0.9% NACL 1 MG/10 ML
SYRINGE (ML) INTRAVENOUS AS NEEDED
Status: DISCONTINUED | OUTPATIENT
Start: 2023-10-26 | End: 2023-10-26 | Stop reason: SURG

## 2023-10-26 RX ORDER — FENTANYL CITRATE 50 UG/ML
INJECTION, SOLUTION INTRAMUSCULAR; INTRAVENOUS
Status: COMPLETED
Start: 2023-10-26 | End: 2023-10-26

## 2023-10-26 RX ORDER — SODIUM CHLORIDE, SODIUM LACTATE, POTASSIUM CHLORIDE, CALCIUM CHLORIDE 600; 310; 30; 20 MG/100ML; MG/100ML; MG/100ML; MG/100ML
INJECTION, SOLUTION INTRAVENOUS CONTINUOUS PRN
Status: DISCONTINUED | OUTPATIENT
Start: 2023-10-26 | End: 2023-10-26 | Stop reason: SURG

## 2023-10-26 RX ORDER — ROCURONIUM BROMIDE 10 MG/ML
INJECTION, SOLUTION INTRAVENOUS AS NEEDED
Status: DISCONTINUED | OUTPATIENT
Start: 2023-10-26 | End: 2023-10-26 | Stop reason: SURG

## 2023-10-26 RX ORDER — OXYCODONE HCL 10 MG/1
10 TABLET, FILM COATED, EXTENDED RELEASE ORAL ONCE
Status: COMPLETED | OUTPATIENT
Start: 2023-10-26 | End: 2023-10-26

## 2023-10-26 RX ORDER — SODIUM CHLORIDE, SODIUM LACTATE, POTASSIUM CHLORIDE, CALCIUM CHLORIDE 600; 310; 30; 20 MG/100ML; MG/100ML; MG/100ML; MG/100ML
90 INJECTION, SOLUTION INTRAVENOUS CONTINUOUS
Status: DISCONTINUED | OUTPATIENT
Start: 2023-10-26 | End: 2023-10-27

## 2023-10-26 RX ORDER — DOCUSATE SODIUM 100 MG/1
100 CAPSULE, LIQUID FILLED ORAL 2 TIMES DAILY PRN
Status: DISCONTINUED | OUTPATIENT
Start: 2023-10-26 | End: 2023-10-28 | Stop reason: HOSPADM

## 2023-10-26 RX ORDER — SODIUM CHLORIDE 9 MG/ML
40 INJECTION, SOLUTION INTRAVENOUS AS NEEDED
Status: DISCONTINUED | OUTPATIENT
Start: 2023-10-26 | End: 2023-10-28 | Stop reason: HOSPADM

## 2023-10-26 RX ORDER — MAGNESIUM HYDROXIDE 1200 MG/15ML
LIQUID ORAL AS NEEDED
Status: DISCONTINUED | OUTPATIENT
Start: 2023-10-26 | End: 2023-10-26 | Stop reason: HOSPADM

## 2023-10-26 RX ORDER — DEXAMETHASONE SODIUM PHOSPHATE 4 MG/ML
INJECTION, SOLUTION INTRA-ARTICULAR; INTRALESIONAL; INTRAMUSCULAR; INTRAVENOUS; SOFT TISSUE AS NEEDED
Status: DISCONTINUED | OUTPATIENT
Start: 2023-10-26 | End: 2023-10-26 | Stop reason: SURG

## 2023-10-26 RX ORDER — PROPOFOL 10 MG/ML
VIAL (ML) INTRAVENOUS AS NEEDED
Status: DISCONTINUED | OUTPATIENT
Start: 2023-10-26 | End: 2023-10-26 | Stop reason: SURG

## 2023-10-26 RX ORDER — SODIUM CHLORIDE, SODIUM LACTATE, POTASSIUM CHLORIDE, CALCIUM CHLORIDE 600; 310; 30; 20 MG/100ML; MG/100ML; MG/100ML; MG/100ML
9 INJECTION, SOLUTION INTRAVENOUS CONTINUOUS
Status: DISCONTINUED | OUTPATIENT
Start: 2023-10-26 | End: 2023-10-26 | Stop reason: SDUPTHER

## 2023-10-26 RX ORDER — PROMETHAZINE HYDROCHLORIDE 12.5 MG/1
12.5 TABLET ORAL EVERY 6 HOURS PRN
Status: DISCONTINUED | OUTPATIENT
Start: 2023-10-26 | End: 2023-10-28 | Stop reason: HOSPADM

## 2023-10-26 RX ORDER — ACETAMINOPHEN 325 MG/1
650 TABLET ORAL 3 TIMES DAILY
Status: DISCONTINUED | OUTPATIENT
Start: 2023-10-26 | End: 2023-10-28 | Stop reason: HOSPADM

## 2023-10-26 RX ORDER — SODIUM CHLORIDE 0.9 % (FLUSH) 0.9 %
10 SYRINGE (ML) INJECTION EVERY 12 HOURS SCHEDULED
Status: DISCONTINUED | OUTPATIENT
Start: 2023-10-26 | End: 2023-10-28 | Stop reason: HOSPADM

## 2023-10-26 RX ORDER — CEFAZOLIN SODIUM 2 G/100ML
2 INJECTION, SOLUTION INTRAVENOUS EVERY 8 HOURS
Status: DISCONTINUED | OUTPATIENT
Start: 2023-10-26 | End: 2023-10-26

## 2023-10-26 RX ORDER — FAMOTIDINE 20 MG/1
20 TABLET, FILM COATED ORAL
Status: COMPLETED | OUTPATIENT
Start: 2023-10-26 | End: 2023-10-26

## 2023-10-26 RX ORDER — VANCOMYCIN HYDROCHLORIDE 1 G/20ML
INJECTION, POWDER, LYOPHILIZED, FOR SOLUTION INTRAVENOUS AS NEEDED
Status: DISCONTINUED | OUTPATIENT
Start: 2023-10-26 | End: 2023-10-26 | Stop reason: HOSPADM

## 2023-10-26 RX ORDER — MORPHINE SULFATE 4 MG/ML
5 INJECTION, SOLUTION INTRAMUSCULAR; INTRAVENOUS EVERY 4 HOURS PRN
Status: DISCONTINUED | OUTPATIENT
Start: 2023-10-26 | End: 2023-10-28 | Stop reason: HOSPADM

## 2023-10-26 RX ORDER — FENTANYL CITRATE 50 UG/ML
INJECTION, SOLUTION INTRAMUSCULAR; INTRAVENOUS AS NEEDED
Status: DISCONTINUED | OUTPATIENT
Start: 2023-10-26 | End: 2023-10-26 | Stop reason: SURG

## 2023-10-26 RX ORDER — SODIUM CHLORIDE 0.9 % (FLUSH) 0.9 %
10 SYRINGE (ML) INJECTION AS NEEDED
Status: DISCONTINUED | OUTPATIENT
Start: 2023-10-26 | End: 2023-10-28 | Stop reason: HOSPADM

## 2023-10-26 RX ORDER — ACETAMINOPHEN 500 MG
1000 TABLET ORAL ONCE
Status: COMPLETED | OUTPATIENT
Start: 2023-10-26 | End: 2023-10-26

## 2023-10-26 RX ORDER — ONDANSETRON 2 MG/ML
INJECTION INTRAMUSCULAR; INTRAVENOUS AS NEEDED
Status: DISCONTINUED | OUTPATIENT
Start: 2023-10-26 | End: 2023-10-26 | Stop reason: SURG

## 2023-10-26 RX ORDER — ONDANSETRON 2 MG/ML
4 INJECTION INTRAMUSCULAR; INTRAVENOUS EVERY 6 HOURS PRN
Status: DISCONTINUED | OUTPATIENT
Start: 2023-10-26 | End: 2023-10-28 | Stop reason: HOSPADM

## 2023-10-26 RX ORDER — EPHEDRINE SULFATE 50 MG/ML
INJECTION INTRAVENOUS AS NEEDED
Status: DISCONTINUED | OUTPATIENT
Start: 2023-10-26 | End: 2023-10-26 | Stop reason: SURG

## 2023-10-26 RX ORDER — L.ACID,PARA/B.BIFIDUM/S.THERM 8B CELL
1 CAPSULE ORAL DAILY
Status: DISCONTINUED | OUTPATIENT
Start: 2023-10-26 | End: 2023-10-28 | Stop reason: HOSPADM

## 2023-10-26 RX ORDER — LIDOCAINE HYDROCHLORIDE 10 MG/ML
0.2 INJECTION, SOLUTION INFILTRATION; PERINEURAL ONCE
Status: COMPLETED | OUTPATIENT
Start: 2023-10-26 | End: 2023-10-26

## 2023-10-26 RX ORDER — SODIUM CHLORIDE, SODIUM LACTATE, POTASSIUM CHLORIDE, CALCIUM CHLORIDE 600; 310; 30; 20 MG/100ML; MG/100ML; MG/100ML; MG/100ML
9 INJECTION, SOLUTION INTRAVENOUS CONTINUOUS
Status: DISCONTINUED | OUTPATIENT
Start: 2023-10-26 | End: 2023-10-28 | Stop reason: HOSPADM

## 2023-10-26 RX ORDER — LIDOCAINE HYDROCHLORIDE 10 MG/ML
INJECTION, SOLUTION EPIDURAL; INFILTRATION; INTRACAUDAL; PERINEURAL AS NEEDED
Status: DISCONTINUED | OUTPATIENT
Start: 2023-10-26 | End: 2023-10-26 | Stop reason: SURG

## 2023-10-26 RX ORDER — FENTANYL CITRATE 50 UG/ML
50 INJECTION, SOLUTION INTRAMUSCULAR; INTRAVENOUS
Status: DISCONTINUED | OUTPATIENT
Start: 2023-10-26 | End: 2023-10-26 | Stop reason: HOSPADM

## 2023-10-26 RX ORDER — HYDROMORPHONE HYDROCHLORIDE 1 MG/ML
0.5 INJECTION, SOLUTION INTRAMUSCULAR; INTRAVENOUS; SUBCUTANEOUS
Status: DISCONTINUED | OUTPATIENT
Start: 2023-10-26 | End: 2023-10-26 | Stop reason: HOSPADM

## 2023-10-26 RX ORDER — BISACODYL 10 MG
10 SUPPOSITORY, RECTAL RECTAL DAILY PRN
Status: DISCONTINUED | OUTPATIENT
Start: 2023-10-26 | End: 2023-10-28 | Stop reason: HOSPADM

## 2023-10-26 RX ORDER — ONDANSETRON 4 MG/1
4 TABLET, FILM COATED ORAL EVERY 6 HOURS PRN
Status: DISCONTINUED | OUTPATIENT
Start: 2023-10-26 | End: 2023-10-28 | Stop reason: HOSPADM

## 2023-10-26 RX ADMIN — HYDROMORPHONE HYDROCHLORIDE 0.5 MG: 1 INJECTION, SOLUTION INTRAMUSCULAR; INTRAVENOUS; SUBCUTANEOUS at 10:06

## 2023-10-26 RX ADMIN — FENTANYL CITRATE 50 MCG: 50 INJECTION, SOLUTION INTRAMUSCULAR; INTRAVENOUS at 14:33

## 2023-10-26 RX ADMIN — SODIUM CHLORIDE, POTASSIUM CHLORIDE, SODIUM LACTATE AND CALCIUM CHLORIDE 90 ML/HR: 600; 310; 30; 20 INJECTION, SOLUTION INTRAVENOUS at 15:12

## 2023-10-26 RX ADMIN — FENTANYL CITRATE 100 MCG: 50 INJECTION, SOLUTION INTRAMUSCULAR; INTRAVENOUS at 06:51

## 2023-10-26 RX ADMIN — OXYCODONE HYDROCHLORIDE 10 MG: 10 TABLET, FILM COATED, EXTENDED RELEASE ORAL at 05:59

## 2023-10-26 RX ADMIN — PROPOFOL 200 MG: 10 INJECTION, EMULSION INTRAVENOUS at 06:57

## 2023-10-26 RX ADMIN — SODIUM CHLORIDE 2000 MG: 900 INJECTION INTRAVENOUS at 18:43

## 2023-10-26 RX ADMIN — ROCURONIUM BROMIDE 10 MG: 10 SOLUTION INTRAVENOUS at 08:42

## 2023-10-26 RX ADMIN — LIDOCAINE HYDROCHLORIDE 50 MG: 10 INJECTION, SOLUTION EPIDURAL; INFILTRATION; INTRACAUDAL; PERINEURAL at 06:57

## 2023-10-26 RX ADMIN — SODIUM CHLORIDE, POTASSIUM CHLORIDE, SODIUM LACTATE AND CALCIUM CHLORIDE 9 ML/HR: 600; 310; 30; 20 INJECTION, SOLUTION INTRAVENOUS at 05:40

## 2023-10-26 RX ADMIN — SODIUM CHLORIDE, POTASSIUM CHLORIDE, SODIUM LACTATE AND CALCIUM CHLORIDE: 600; 310; 30; 20 INJECTION, SOLUTION INTRAVENOUS at 06:54

## 2023-10-26 RX ADMIN — Medication 10 ML: at 20:21

## 2023-10-26 RX ADMIN — LIDOCAINE HYDROCHLORIDE 0.2 ML: 10 INJECTION, SOLUTION EPIDURAL; INFILTRATION; INTRACAUDAL; PERINEURAL at 05:40

## 2023-10-26 RX ADMIN — ACETAMINOPHEN 1000 MG: 500 TABLET ORAL at 05:59

## 2023-10-26 RX ADMIN — ROCURONIUM BROMIDE 40 MG: 10 SOLUTION INTRAVENOUS at 06:57

## 2023-10-26 RX ADMIN — ROCURONIUM BROMIDE 10 MG: 10 SOLUTION INTRAVENOUS at 07:18

## 2023-10-26 RX ADMIN — PHENYLEPHRINE HYDROCHLORIDE 0.3 MCG/KG/MIN: 10 INJECTION INTRAVENOUS at 07:49

## 2023-10-26 RX ADMIN — FAMOTIDINE 20 MG: 20 TABLET, FILM COATED ORAL at 20:20

## 2023-10-26 RX ADMIN — FAMOTIDINE 20 MG: 20 TABLET, FILM COATED ORAL at 05:59

## 2023-10-26 RX ADMIN — Medication 10 ML: at 16:47

## 2023-10-26 RX ADMIN — FENTANYL CITRATE 50 MCG: 50 INJECTION, SOLUTION INTRAMUSCULAR; INTRAVENOUS at 10:13

## 2023-10-26 RX ADMIN — ONDANSETRON 4 MG: 2 INJECTION INTRAMUSCULAR; INTRAVENOUS at 09:24

## 2023-10-26 RX ADMIN — EPHEDRINE SULFATE 10 MG: 50 INJECTION INTRAVENOUS at 07:41

## 2023-10-26 RX ADMIN — ACETAMINOPHEN 650 MG: 325 TABLET ORAL at 16:47

## 2023-10-26 RX ADMIN — OXYCODONE HYDROCHLORIDE AND ACETAMINOPHEN 1 TABLET: 7.5; 325 TABLET ORAL at 20:20

## 2023-10-26 RX ADMIN — SODIUM CHLORIDE, POTASSIUM CHLORIDE, SODIUM LACTATE AND CALCIUM CHLORIDE: 600; 310; 30; 20 INJECTION, SOLUTION INTRAVENOUS at 08:06

## 2023-10-26 RX ADMIN — SODIUM CHLORIDE 2 G: 900 INJECTION INTRAVENOUS at 06:54

## 2023-10-26 RX ADMIN — Medication 100 MCG: at 07:07

## 2023-10-26 RX ADMIN — SUGAMMADEX 200 MG: 100 INJECTION, SOLUTION INTRAVENOUS at 09:35

## 2023-10-26 RX ADMIN — IBUPROFEN 600 MG: 600 TABLET, FILM COATED ORAL at 21:14

## 2023-10-26 RX ADMIN — ROCURONIUM BROMIDE 10 MG: 10 SOLUTION INTRAVENOUS at 07:50

## 2023-10-26 RX ADMIN — IBUPROFEN 800 MG: 800 TABLET ORAL at 06:00

## 2023-10-26 RX ADMIN — Medication 100 MCG: at 07:32

## 2023-10-26 RX ADMIN — DEXAMETHASONE SODIUM PHOSPHATE 8 MG: 4 INJECTION, SOLUTION INTRAMUSCULAR; INTRAVENOUS at 07:08

## 2023-10-26 RX ADMIN — Medication 1 CAPSULE: at 16:47

## 2023-10-26 NOTE — OP NOTE
NEUROSURGICAL OPERATIVE NOTE        PREOPERATIVE DIAGNOSIS:    L4-5 spondylolisthesis and stenosis with instability  Left L4-5 synovial cyst      POSTOPERATIVE DIAGNOSIS:  Same      PROCEDURE:  1.  Arthrodesis interbody type L4-5  2.  L4-5 laminectomy with partial facetectomies and foraminotomies  3.  Left L4-5 synovial cyst removal  4.  Bilateral L4-5 discectomy with bilateral Adaptix cage placement  5.  Nonsegmental pedicle screw fixation L4-5 utilizing Solera 5.5  6.  Use of [place] and local autograft  7.  Stealth frameless stereotaxy utilized in conjunction with O arm imaging      SURGEON:  Jose Farmer M.D.      ASSISTANT: Stephen Garcia PA-C    PAC assisted with:   Suctioning   Retraction   Tying   Suturing   Closing   Application of dressing   Skilled neurosurgery PA assistance was necessary to perform this procedure.        ANESTHESIA:  General      ESTIMATED BLOOD LOSS: 110 mL      SPECIMEN: Synovial cyst      DRAINS: Hemovac      COMPLICATIONS:  None      Spinal Surgery Levels Completed:1 Level        CLINICAL NOTE:  The patient is a 63-year-old woman with progressive and ongoing back and left leg pain.  Studies demonstrate a spondylolisthesis at L4-5 with evidence of instability.  There is stenosis and a large left synovial cyst as such, she presents at this time for L4-5 decompression with fusion and stabilization.  The nature of the procedure as well as the potential risks, complications, limitations, and alternatives to the procedure were discussed at length with the patient and the patient has agreed to proceed with surgery.      TECHNICAL NOTE:  The patient was brought to the operating room and while on her cart general endotracheal anesthesia was achieved. She was then turned prone onto the Marvin table. Special care was ensured to protect pressure points. Her low back was prepared and draped in the usual fashion. A localizing radiograph was obtained with a spinal needle in the lumbosacral  midline. Based on this, a several centimeter vertical incision was fashioned. Underlying tissues were divided with cautery to provide exposure to the posterior spinal elements at L4-5. Reference frame was affixed to a spinous process. O-arm imaging ensued. These images were downloaded into the [email or web address]. Using Stealth frameless stereotaxy, each of the pedicle screw hole sites at L4 and L5 bilaterally were marked, drilled and tapped. 6.5 mm diameter screws were used throughout. These were 45 mm in length at L4 and 40 mm in length at L5. Le[place]ell rongeur was then utilized to remove the spinous process at L4 and the upper aspect of L5. The facets were partially resected. Extensive granulation tissue was identified and much of this was adherent to the dura particularly on the left. A synovial cyst was carefully dissected from the dura on the left side. Some of this was submitted to pathology. The capsule was stripped but a small remnant was left attached to the dura. Similar decompression was performed on the right side to remove overgrown ligament and bone. The C-arm was then brought into use and beginning on the right at L4-5 disc was incised and evacuated piecemeal with an array of pituitary rongeurs, curettes and punches. Serial impactors were impacted into the disc space. Ultimately an 11 x 24 mm Adaptix cage was impacted into place using fluoroscopic guidance. This had been packed with a fusion substrate consisting of [place] and local autograft. Attention was turned to the contralateral side where the disc space was prepared prior to placing the 2nd 11 x 24 mm Adaptix cage. Some of the fusion substrate was placed anteriorly and in the midline. The 2nd cage was placed. Nerve roots were inspected and found to be unencumbered. Solera rods measuring 40 mm in length were affixed to the screw heads on each side. Using a “diving board” technique and a mechanical reducer, the L4 pedicle screws were brought up to the  chanel to allow for completion reduction of the listhesis. Set screws were applied, tightened and broken off per routine. The wound was washed out with a saline solution. Gelfoam was left in the gutters. A Hemovac drain was brought in through a separate stab incision and left in the epidural space. Vancomycin powder was sprinkled in the depths and then more superficially as the wound was closed. The paraspinous muscle and fascia were reapproximated in an interrupted fashion with 0 Vicryl suture; 0.25% Marcaine was instilled in the paraspinous musculature and subcutaneous tissues. The subcutaneous tissues were closed in layers with 2-0 followed by 3-0 Vicryl suture. The skin was closed in a running subcuticular fashion with 3-0 Vicryl suture. Steri-Strips and a sterile dressing were applied. The patient was rolled onto her cart, extubated and taken to the recovery room in satisfactory condition. There were no overt intraoperative complications.             Jose Farmer M.D.

## 2023-10-26 NOTE — ANESTHESIA POSTPROCEDURE EVALUATION
Patient: Samanta Mera    Procedure Summary       Date: 10/26/23 Room / Location:  [facility] /  [facility]    Anesthesia Start: 0654 Anesthesia Stop:     Procedure: LUMBAR FUSION DECOMPRESSON WITH PEDICLE SCREWS L4-5 (Spine Lumbar) Diagnosis:       Synovial cyst of lumbar facet joint      (Synovial cyst of lumbar facet joint [M71.38])    Surgeons: Jose Farmer MD Provider: Champ Manuel MD    Anesthesia Type: general ASA Status: 3            Anesthesia Type: general    Vitals  Vitals Value Taken Time   /76 10/26/23 0945   Temp     Pulse 63 10/26/23 0951   Resp     SpO2 99 % 10/26/23 0951   Vitals shown include unfiled device data.    TEMP 97.0    Post Anesthesia Care and Evaluation    Patient location during evaluation: PACU  Patient participation: complete - patient participated  Level of consciousness: awake and alert  Pain management: adequate    Airway patency: patent  Anesthetic complications: No anesthetic complications  PONV Status: none  Cardiovascular status: hemodynamically stable and acceptable  Respiratory status: nonlabored ventilation, acceptable and nasal cannula  Hydration status: acceptable

## 2023-10-26 NOTE — ANESTHESIA PROCEDURE NOTES
Airway  Urgency: elective    Date/Time: 10/26/2023 6:59 AM  Airway not difficult    General Information and Staff    Patient location during procedure: OR  CRNA/CAA: Tracey Danielle CRNA    Indications and Patient Condition  Indications for airway management: airway protection    Preoxygenated: yes  MILS not maintained throughout  Mask difficulty assessment: 1 - vent by mask    Final Airway Details  Final airway type: endotracheal airway      Successful airway: ETT  Cuffed: yes   Successful intubation technique: direct laryngoscopy  Endotracheal tube insertion site: oral  Blade: [place]  Blade size: 2  ETT size (mm): 7.0  Cormack-Lehane Classification: grade I - full view of glottis  Placement verified by: chest auscultation and capnometry   Measured from: lips  ETT/EBT  to lips (cm): 20  Number of attempts at approach: 1  Assessment: lips, teeth, and gum same as pre-op and atraumatic intubation    Additional Comments  Negative epigastric sounds, Breath sound equal bilaterally with symmetric chest rise and fall

## 2023-10-26 NOTE — H&P
"Pre-Op H&P  Samanta Mera  [id number]  1960    Chief complaint: \"Left leg pain\"    HPI:    Patient is a 63 y.o.female who presents with left leg pain is been going on for the last 6 months.  Associated with some change in sensation.  It occasionally includes the hip.  She has tried nonconservative therapy.  This includes tincture of time, nonsteroidal anti-inflammatories epidural steroid injections.  The injections helped very short-term.  Pain is aggravated by weightbearing or sitting.  After failing conservative therapy she is now admitted for surgical intervention.    Review of Systems:  General ROS: negative for chills, fever or skin lesions;  No changes since last office visit.  Neg for recent sick exposure  Cardiovascular ROS: no chest pain or dyspnea on exertion  Respiratory ROS: no cough, shortness of breath, or wheezing    Allergies: No Known Allergies    Home Meds:    No current facility-administered medications on file prior to encounter.     Current Outpatient Medications on File Prior to Encounter   Medication Sig Dispense Refill    chlorhexidine (HIBICLENS) 4 % external liquid Shower each day with solution for 5 days beginning 5 days before surgery. 120 mL 0    ibuprofen (ADVIL,MOTRIN) 200 MG tablet Take 3 tablets by mouth Every 6 (Six) Hours As Needed for Mild Pain.      [DISCONTINUED] mupirocin (BACTROBAN) 2 % nasal ointment Apply to the inside of each nostril with a cotton swab two times daily, morning and evening, for 5 days before surgery. 10 each 0       PMH:   Past Medical History:   Diagnosis Date    Hx of radiation therapy     Low back pain     Lumbosacral disc disease     left side lower back    Malignant neoplasm of right breast in female, estrogen receptor positive 2018    radiation and surgery    Sleep apnea     cpap qhs     PSH:    Past Surgical History:   Procedure Laterality Date    BREAST BIOPSY Right     BREAST LUMPECTOMY Right      SECTION      X 3    " "COLONOSCOPY  03/2019    HYSTERECTOMY      age 55, full    LUMBAR SYNOVIAL CYST REMOVAL  08/24/2023    facet cyst/steroid injection    OOPHORECTOMY      SKIN BIOPSY      basal cell or squamous    SKIN CANCER EXCISION      chest, face     Patient denies allergy to contrast dye or latex  Immunization History:  Influenza: Yes  Pneumococcal: No  Tetanus: Up-to-date    Social History:   Tobacco:   Social History     Tobacco Use   Smoking Status Never    Passive exposure: Past   Smokeless Tobacco Never      Alcohol:     Social History     Substance and Sexual Activity   Alcohol Use Not Currently    Alcohol/week: 1.0 - 2.0 standard drink of alcohol    Types: 1 - 2 Glasses of wine per week    Comment: SOCIALLY       Vitals:           /85 (BP Location: Right arm, Patient Position: Sitting)   Pulse 74   Temp 97.1 °F (36.2 °C) (Temporal)   Resp 18   Ht 167.6 cm (66\")   Wt 67.1 kg (148 lb)   SpO2 97%   BMI 23.89 kg/m²     Physical Exam:  General Appearance:    Alert, cooperative, no distress, appears stated age   Head:    Normocephalic, without obvious abnormality, atraumatic   Lungs:   Clear to auscultation bilaterally to the bases    Heart: S1-S2 without rubs murmurs or gallops    Abdomen:  Soft, nontender, bowel sounds present throughout.   Breast Exam:    deferred   Genitalia:    deferred   Extremities:   Extremities normal, atraumatic, no cyanosis or edema   Skin:   Skin color, texture, turgor normal, no rashes or lesions   Neurologic:   Grossly intact   Results Review  LABS:  Lab Results   Component Value Date    WBC 5.24 10/19/2023    HGB 11.7 (L) 10/19/2023    HCT 36.2 10/19/2023    MCV 92.6 10/19/2023     10/19/2023    NEUTROABS 3.30 04/20/2022    GLUCOSE 94 06/21/2023    BUN 24 (H) 06/21/2023    CREATININE 0.89 06/21/2023    EGFRIFNONA 67 10/21/2021     06/21/2023    K 4.5 06/21/2023     06/21/2023    CO2 28.0 06/21/2023    CALCIUM 9.6 06/21/2023    ALBUMIN 4.4 06/21/2023    AST 20 " 06/21/2023    ALT 15 06/21/2023    BILITOT 0.3 06/21/2023       RADIOLOGY:  No radiology results for the last 3 days     I reviewed the patient's new clinical results.    Cancer Staging (if applicable)  Cancer Patient: __ yes __no __unknown; If yes, clinical stage T:__ N:__M:__, stage group or __N/A    Impression: Left lower extremity radiculopathy  L4-5 spondylolisthesis  Left L4-5 synovial cyst  History of malignant neoplasm of the right breast estrogen receptor positive  Obstructive sleep apnea      Plan: Lumbar fusion, decompression with pedicle screws L4-5      PAULIE Caal   10/26/23   6:37 AM EDT

## 2023-10-26 NOTE — ANESTHESIA PREPROCEDURE EVALUATION
Anesthesia Evaluation     Patient summary reviewed and Nursing notes reviewed                Airway   Mallampati: I  TM distance: >3 FB  Neck ROM: full  No difficulty expected  Dental - normal exam     Pulmonary - normal exam   (+) ,sleep apnea  Cardiovascular - negative cardio ROS and normal exam        Neuro/Psych- negative ROS  GI/Hepatic/Renal/Endo - negative ROS     Musculoskeletal     (+) back pain  Abdominal  - normal exam    Bowel sounds: normal.   Substance History - negative use     OB/GYN negative ob/gyn ROS         Other      history of cancer (BREAST, SKIN)                  Anesthesia Plan    ASA 3     general     intravenous induction     Anesthetic plan, risks, benefits, and alternatives have been provided, discussed and informed consent has been obtained with: patient.    Plan discussed with CRNA.    CODE STATUS:

## 2023-10-26 NOTE — LETTER
[facility]    Patient: Samanta Mera  : 1960      Dear Ms. Suero,    I just completed the lumbar decompression with fusion and stabilization on Ms. Mera.  Thus far all has gone well.  She will likely be hospitalized for 2-3 days.      With kindest regards,    Jose Farmer MD  10/26/23  09:36 EDT

## 2023-10-27 LAB
CYTO UR: NORMAL
HCT VFR BLD AUTO: 30.8 % (ref 34–46.6)
HGB BLD-MCNC: 9.7 G/DL (ref 12–15.9)
LAB AP CASE REPORT: NORMAL
LAB AP CLINICAL INFORMATION: NORMAL
PATH REPORT.FINAL DX SPEC: NORMAL
PATH REPORT.GROSS SPEC: NORMAL

## 2023-10-27 PROCEDURE — 99024 POSTOP FOLLOW-UP VISIT: CPT | Performed by: NEUROLOGICAL SURGERY

## 2023-10-27 PROCEDURE — 97161 PT EVAL LOW COMPLEX 20 MIN: CPT

## 2023-10-27 PROCEDURE — 25810000003 LACTATED RINGERS PER 1000 ML: Performed by: NEUROLOGICAL SURGERY

## 2023-10-27 PROCEDURE — 97535 SELF CARE MNGMENT TRAINING: CPT

## 2023-10-27 PROCEDURE — 97165 OT EVAL LOW COMPLEX 30 MIN: CPT

## 2023-10-27 PROCEDURE — 85014 HEMATOCRIT: CPT | Performed by: NEUROLOGICAL SURGERY

## 2023-10-27 PROCEDURE — 97530 THERAPEUTIC ACTIVITIES: CPT

## 2023-10-27 PROCEDURE — 25010000002 CEFAZOLIN PER 500 MG: Performed by: NEUROLOGICAL SURGERY

## 2023-10-27 PROCEDURE — 85018 HEMOGLOBIN: CPT | Performed by: NEUROLOGICAL SURGERY

## 2023-10-27 RX ADMIN — Medication 10 ML: at 21:01

## 2023-10-27 RX ADMIN — SODIUM CHLORIDE, POTASSIUM CHLORIDE, SODIUM LACTATE AND CALCIUM CHLORIDE 90 ML/HR: 600; 310; 30; 20 INJECTION, SOLUTION INTRAVENOUS at 01:13

## 2023-10-27 RX ADMIN — IBUPROFEN 600 MG: 600 TABLET, FILM COATED ORAL at 13:04

## 2023-10-27 RX ADMIN — OXYCODONE HYDROCHLORIDE AND ACETAMINOPHEN 1 TABLET: 7.5; 325 TABLET ORAL at 13:04

## 2023-10-27 RX ADMIN — FAMOTIDINE 20 MG: 20 TABLET, FILM COATED ORAL at 21:00

## 2023-10-27 RX ADMIN — FAMOTIDINE 20 MG: 20 TABLET, FILM COATED ORAL at 10:17

## 2023-10-27 RX ADMIN — Medication 10 ML: at 10:17

## 2023-10-27 RX ADMIN — IBUPROFEN 600 MG: 600 TABLET, FILM COATED ORAL at 05:18

## 2023-10-27 RX ADMIN — ACETAMINOPHEN 650 MG: 325 TABLET ORAL at 21:00

## 2023-10-27 RX ADMIN — ACETAMINOPHEN 650 MG: 325 TABLET ORAL at 10:16

## 2023-10-27 RX ADMIN — IBUPROFEN 600 MG: 600 TABLET, FILM COATED ORAL at 21:00

## 2023-10-27 RX ADMIN — SODIUM CHLORIDE 2000 MG: 900 INJECTION INTRAVENOUS at 01:11

## 2023-10-27 RX ADMIN — Medication 1 CAPSULE: at 10:16

## 2023-10-27 NOTE — PLAN OF CARE
Goal Outcome Evaluation:  Plan of Care Reviewed With: patient        Progress: improving  Outcome Evaluation: OT evaluation completed. Postoperatively, pt presents with decreased I in ADLs, related t/fs, mobility compared to PLOF limited by decreased activity tolerance, mild impaired balance deficits, incisional pain and decreased distal reach impacting LB ADLs. OT issued LH AE to assist with LBD while adhering to spinal preautions, pt competency in precautions improved as did optimal seq for threading and unthreading and access. Anticipate greater I in ADLs w/ use of AE. Pt req'd SBA in STS t/fs at different surfaces and CGA progressed to SBA in fxl mobility initially with AE and later w/o and no overt LOB. Pt is below occuaptional performance and would benefit from IPOT POC and home w/ A at d/c when medically ready.

## 2023-10-27 NOTE — THERAPY EVALUATION
Patient Name: Samanta Mera  : 1960    MRN: [id number]                              Today's Date: 10/27/2023       Admit Date: 10/26/2023    Visit Dx:     ICD-10-CM ICD-9-CM   1. Synovial cyst of lumbar facet joint  M71.38 727.40     Patient Active Problem List   Diagnosis    Malignant neoplasm of right breast in female, estrogen receptor positive    Abnormal ECG    Absolute anemia    Skin cancer    Seasonal allergies    Elevated liver enzymes    Urgency of urination    Acute non-recurrent frontal sinusitis    CELESTINO (obstructive sleep apnea)    History of hysterectomy    Osteopenia of neck of left femur    Synovial cyst of lumbar facet joint     Past Medical History:   Diagnosis Date    Hx of radiation therapy     Low back pain     Lumbosacral disc disease     left side lower back    Malignant neoplasm of right breast in female, estrogen receptor positive 2018    radiation and surgery    Sleep apnea     cpap qhs     Past Surgical History:   Procedure Laterality Date    BREAST BIOPSY Right     BREAST LUMPECTOMY Right      SECTION      X 3    COLONOSCOPY  2019    HYSTERECTOMY      age 55, full    LUMBAR SYNOVIAL CYST REMOVAL  2023    facet cyst/steroid injection    OOPHORECTOMY      SKIN BIOPSY      basal cell or squamous    SKIN CANCER EXCISION      chest, face      General Information       Row Name 10/27/23 0949          Physical Therapy Time and Intention    Document Type evaluation  -CM     Mode of Treatment individual therapy;physical therapy  -CM       Row Name 10/27/23 0949          General Information    Patient Profile Reviewed yes  -CM     Prior Level of Function min assist:;all household mobility;ADL's;driving;work  no AD use PTA, works as a NICU nurse  -CM     Existing Precautions/Restrictions fall;spinal  hemovac  -CM     Barriers to Rehab none identified  -CM       Row Name 10/27/23 0949          Living Environment    People in Home spouse  -CM       Row Name  10/27/23 0949          Home Main Entrance    Number of Stairs, Main Entrance three  -CM     Stair Railings, Main Entrance none  -CM       Row Name 10/27/23 0949          Stairs Within Home, Primary    Stairs, Within Home, Primary 2 story home, all needs met on main floor  -CM     Number of Stairs, Within Home, Primary twelve  -CM       Row Name 10/27/23 0949          Cognition    Orientation Status (Cognition) oriented x 4  -CM       Row Name 10/27/23 0949          Safety Issues, Functional Mobility    Safety Issues Affecting Function (Mobility) safety precaution awareness  -CM     Impairments Affecting Function (Mobility) pain;strength;balance  -CM               User Key  (r) = Recorded By, (t) = Taken By, (c) = Cosigned By      Initials Name Provider Type    CM Elsie Pastor PT Physical Therapist                   Mobility       Row Name 10/27/23 0950          Bed Mobility    Comment, (Bed Mobility) [facility] pre/post eval, did verball review logroll technique  -CM       Row Name 10/27/23 0950          Sit-Stand Transfer    Sit-Stand [place] (Transfers) standby assist  -CM     Assistive Device (Sit-Stand Transfers) walker, front-wheeled  -CM     Comment, (Sit-Stand Transfer) patient demo'd safe hand placement without cues, maintained spinal precautions very well  -CM       Row Name 10/27/23 0950          Gait/Stairs (Locomotion)    [place] Level (Gait) standby assist  -CM     Assistive Device (Gait) walker, front-wheeled;other (see comments)  progressed to no AD  -CM     Distance in Feet (Gait) 400'  -CM     Deviations/Abnormal Patterns (Gait) bilateral deviations;miguel[place] decreased;gait speed decreased  -CM     Handrail Location (Stairs) left side (ascending);right side (descending)  -CM     Number of Steps (Stairs) 10  -CM     Ascending Technique (Stairs) step-to-step  -CM     Descending Technique (Stairs) step-to-step  -CM     Comment, (Gait/Stairs) Patient ambulated in fuchs initially ~15' with W  progressing to no AD for remainder of ambulation. She demonstrates a step through gait pattern, very mildly decreased heel strike on LLE however no foot drop or toe drag noted. She also ascended/descended 10 steps without difficulty. Education provided for sequencing. Patient demonstrates good balance and sequencing on steps.  -CM               User Key  (r) = Recorded By, (t) = Taken By, (c) = Cosigned By      Initials Name Provider Type    CM Elsie Pastor, PT Physical Therapist                   Obj/Interventions       Row Name 10/27/23 0953          Range of Motion Comprehensive    General Range of Motion no range of motion deficits identified  -CM       Row Name 10/27/23 0953          Strength Comprehensive (MMT)    General Manual Muscle Testing (MMT) Assessment lower extremity strength deficits identified  -CM     Comment, General Manual Muscle Testing (MMT) Assessment RLE 4+/5, LLE 4/5  -CM       Row Name 10/27/23 0953          Motor Skills    Therapeutic Exercise hip;knee;ankle;other (see comments)  abdominal sets 1x3  -CM       Row Name 10/27/23 0953          Hip (Therapeutic Exercise)    Hip (Therapeutic Exercise) isometric exercises  -     Hip Isometrics (Therapeutic Exercise) bilateral;gluteal sets;10 repetitions;3 second hold  -CM       Row Name 10/27/23 0953          Knee (Therapeutic Exercise)    Knee (Therapeutic Exercise) strengthening exercise  -CM     Knee Strengthening (Therapeutic Exercise) bilateral;LAQ (long arc quad);10 repetitions  -CM       Row Name 10/27/23 0953          Ankle (Therapeutic Exercise)    Ankle (Therapeutic Exercise) AROM (active range of motion)  -CM     Ankle AROM (Therapeutic Exercise) bilateral;dorsiflexion;plantarflexion;10 repetitions  -CM       Row Name 10/27/23 0953          Balance    Balance Assessment sitting static balance;standing static balance;standing dynamic balance  -CM     Static Sitting Balance independent  -CM     Position, Sitting Balance  unsupported;sitting in chair  -CM     Static Standing Balance standby assist  -CM     Dynamic Standing Balance standby assist  -CM     Position/Device Used, Standing Balance unsupported  -CM     Comment, Balance no LOB  -CM       Row Name 10/27/23 0953          Sensory Assessment (Somatosensory)    Sensory Assessment (Somatosensory) LE sensation intact  -CM               User Key  (r) = Recorded By, (t) = Taken By, (c) = Cosigned By      Initials Name Provider Type    CM Elsie Pastor, PT Physical Therapist                   Goals/Plan       Row Name 10/27/23 0956          Bed Mobility Goal 1 (PT)    Activity/Assistive Device (Bed Mobility Goal 1, PT) sit to supine/supine to sit  -CM     [place] Level/Cues Needed (Bed Mobility Goal 1, PT) supervision required  -CM     Time Frame (Bed Mobility Goal 1, PT) long term goal (LTG);10 days  -CM     Progress/Outcomes (Bed Mobility Goal 1, PT) new goal  -CM       Row Name 10/27/23 0956          Transfer Goal 1 (PT)    Activity/Assistive Device (Transfer Goal 1, PT) sit-to-stand/stand-to-sit;bed-to-chair/chair-to-bed  -CM     [place] Level/Cues Needed (Transfer Goal 1, PT) independent  -CM     Time Frame (Transfer Goal 1, PT) long term goal (LTG);10 days  -CM     Progress/Outcome (Transfer Goal 1, PT) new goal  -CM       Row Name 10/27/23 0956          Gait Training Goal 1 (PT)    Activity/Assistive Device (Gait Training Goal 1, PT) gait (walking locomotion)  -CM     [place] Level (Gait Training Goal 1, PT) standby assist  -CM     Distance (Gait Training Goal 1, PT) 500'  -CM     Time Frame (Gait Training Goal 1, PT) long term goal (LTG);10 days  -CM     Progress/Outcome (Gait Training Goal 1, PT) new goal  -CM       Row Name 10/27/23 0956          Stairs Goal 1 (PT)    Activity/Assistive Device (Stairs Goal 1, PT) ascending stairs;descending stairs  -CM     [place] Level/Cues Needed (Stairs Goal 1, PT) contact guard required  -CM     Number of  Stairs (Stairs Goal 1, PT) 4  -CM       Row Name 10/27/23 0956          Therapy Assessment/Plan (PT)    Planned Therapy Interventions (PT) balance training;bed mobility training;gait training;home exercise program;stretching;strengthening;stair training;ROM (range of motion);postural re-education;patient/family education;transfer training  -CM               User Key  (r) = Recorded By, (t) = Taken By, (c) = Cosigned By      Initials Name Provider Type    CM Elsie Pastor, PT Physical Therapist                   Clinical Impression       Row Name 10/27/23 0954          Pain    Pretreatment Pain Rating 3/10  -CM     Posttreatment Pain Rating 3/10  -CM     Pain Location - Side/Orientation Bilateral  -CM     Pain Location incisional  -CM     Pain Location - back  -CM     Pain Intervention(s) Ambulation/increased activity;Repositioned  -CM       Row Name 10/27/23 0988          Plan of Care Review    Plan of Care Reviewed With patient  -CM     Outcome Evaluation Patient presents with mild pain and mild deficits in strength and balance. She was able to ambulate 400' SBA initially with FWW progressing to no AD. She navigated 10 steps CGA without difficulty. Precautions and HEP reviewed. IPPT indicated to address current deficits. Recommend D/C home with assist when medically appropriate.  -CM       Row Name 10/27/23 0938          Therapy Assessment/Plan (PT)    Rehab Potential (PT) good, to achieve stated therapy goals  -CM     Criteria for Skilled Interventions Met (PT) yes;meets criteria  -CM     Therapy Frequency (PT) daily  -CM       Row Name 10/27/23 0968          Vital Signs    Pre Systolic BP Rehab 118  -CM     Pre Treatment Diastolic BP 73  -CM     Pretreatment Heart Rate (beats/min) 71  -CM     Posttreatment Heart Rate (beats/min) 65  -CM     Pre SpO2 (%) 95  -CM     O2 Delivery Pre Treatment room air  -CM     O2 Delivery Intra Treatment room air  -CM     Post SpO2 (%) 98  -CM     O2 Delivery Post Treatment  room air  -CM     Pre Patient Position Sitting  -CM     Intra Patient Position Standing  -CM     Post Patient Position Sitting  -CM       Row Name 10/27/23 0954          Positioning and Restraints    Pre-Treatment Position sitting in chair/recliner  -CM     Post Treatment Position chair  -CM     In Chair sitting;call light within reach;encouraged to call for assist;exit alarm on;notified nsg  -CM               User Key  (r) = Recorded By, (t) = Taken By, (c) = Cosigned By      Initials Name Provider Type    Elsie White, DARWIN Physical Therapist                   Outcome Measures       Row Name 10/27/23 0957 10/27/23 0202       How much help from another person do you currently need...    Turning from your back to your side while in flat bed without using bedrails? 4  -CM 4  -TA    Moving from lying on back to sitting on the side of a flat bed without bedrails? 3  -CM 3  -TA    Moving to and from a bed to a chair (including a wheelchair)? 3  -CM 3  -TA    Standing up from a chair using your arms (e.g., wheelchair, bedside chair)? 3  -CM 3  -TA    Climbing 3-5 steps with a railing? 3  -CM 3  -TA    To walk in hospital room? 3  -CM 3  -TA    AM-PAC 6 Clicks Score (PT) 19  -CM 19  -TA    Highest level of mobility 6 --> Walked 10 steps or more  -CM 6 --> Walked 10 steps or more  -TA      Row Name 10/27/23 0957          Functional Assessment    Outcome Measure Options AM-PAC 6 Clicks Basic Mobility (PT)  -CM               User Key  (r) = Recorded By, (t) = Taken By, (c) = Cosigned By      Initials Name Provider Type    Elsie White, DARWIN Physical Therapist    Bryce Vazquez, RN Registered Nurse                                 Physical Therapy Education       Title: PT OT SLP Therapies (Done)       Topic: Physical Therapy (Done)       Point: Mobility training (Done)       Learning Progress Summary             Patient Acceptance, E, VU by RAINA at 10/27/2023 0957                         Point: Home  exercise program (Done)       Learning Progress Summary             Patient Acceptance, E, VU by CM at 10/27/2023 0957                         Point: Body mechanics (Done)       Learning Progress Summary             Patient Acceptance, E, VU by CM at 10/27/2023 0957                         Point: Precautions (Done)       Learning Progress Summary             Patient Acceptance, E, VU by CM at 10/27/2023 0957                                         User Key       Initials Effective Dates Name Provider Type Discipline    CM 09/22/22 -  Elsie Pastor, PT Physical Therapist PT                  PT Recommendation and Plan  Planned Therapy Interventions (PT): balance training, bed mobility training, gait training, home exercise program, stretching, strengthening, stair training, ROM (range of motion), postural re-education, patient/family education, transfer training  Plan of Care Reviewed With: patient  Outcome Evaluation: Patient presents with mild pain and mild deficits in strength and balance. She was able to ambulate 400' SBA initially with FWW progressing to no AD. She navigated 10 steps CGA without difficulty. Precautions and HEP reviewed. IPPT indicated to address current deficits. Recommend D/C home with assist when medically appropriate.     Time Calculation:   PT Evaluation Complexity  History, PT Evaluation Complexity: 1-2 personal factors and/or comorbidities  Examination of Body Systems (PT Eval Complexity): total of 3 or more elements  Clinical Presentation (PT Evaluation Complexity): stable  Clinical Decision Making (PT Evaluation Complexity): low complexity  Overall Complexity (PT Evaluation Complexity): low complexity     PT Charges       Row Name 10/27/23 0957             Time Calculation    Start Time 0913  -CM      PT Received On 10/27/23  -CM      PT Goal Re-Cert Due Date 11/06/23  -CM         Untimed Charges    PT Eval/Re-eval Minutes 51  -CM         Total Minutes    Untimed Charges Total  Minutes 51  -CM       Total Minutes 51  -CM                User Key  (r) = Recorded By, (t) = Taken By, (c) = Cosigned By      Initials Name Provider Type    Elsie White, PT Physical Therapist                  Therapy Charges for Today       Code Description Service Date Service Provider Modifiers Qty    85951309949 HC PT EVAL LOW COMPLEXITY 4 10/27/2023 Elsie Pastor, PT GP 1            PT G-Codes  Outcome Measure Options: AM-PAC 6 Clicks Basic Mobility (PT)  AM-PAC 6 Clicks Score (PT): 19  PT Discharge Summary  Anticipated Discharge Disposition (PT): home with assist    Elsie Pastor, DARWIN  10/27/2023

## 2023-10-27 NOTE — CASE MANAGEMENT/SOCIAL WORK
Continued Stay Note  [facility]     Patient Name: Samanta Mera  MRN: [id number]  Today's Date: 10/27/2023    Admit Date: 10/26/2023    Plan: Home   Discharge Plan       Row Name 10/27/23 1108       Plan    Plan Home    Patient/Family in Agreement with Plan yes    Plan Comments Per Dr. Farmer's note, pt will likely discharge home over the weekend. Yana/JOSE, spoke with pt, at the bedside. No needs voiced or identified. PT/OT worked with pt today, recommend home. Family will transport at discharge.    Final Discharge Disposition Code 01 - home or self-care                   Discharge Codes    No documentation.                 Expected Discharge Date and Time       Expected Discharge Date Expected Discharge Time    Oct 28, 2023               Cora Pryor RN

## 2023-10-27 NOTE — THERAPY EVALUATION
Patient Name: Samanta Mera  : 1960    MRN: [id number]                              Today's Date: 10/27/2023       Admit Date: 10/26/2023    Visit Dx:     ICD-10-CM ICD-9-CM   1. Synovial cyst of lumbar facet joint  M71.38 727.40     Patient Active Problem List   Diagnosis    Malignant neoplasm of right breast in female, estrogen receptor positive    Abnormal ECG    Absolute anemia    Skin cancer    Seasonal allergies    Elevated liver enzymes    Urgency of urination    Acute non-recurrent frontal sinusitis    CELESTINO (obstructive sleep apnea)    History of hysterectomy    Osteopenia of neck of left femur    Synovial cyst of lumbar facet joint     Past Medical History:   Diagnosis Date    Hx of radiation therapy     Low back pain     Lumbosacral disc disease     left side lower back    Malignant neoplasm of right breast in female, estrogen receptor positive 2018    radiation and surgery    Sleep apnea     cpap qhs     Past Surgical History:   Procedure Laterality Date    BREAST BIOPSY Right     BREAST LUMPECTOMY Right      SECTION      X 3    COLONOSCOPY  2019    HYSTERECTOMY      age 55, full    LUMBAR SYNOVIAL CYST REMOVAL  2023    facet cyst/steroid injection    OOPHORECTOMY      SKIN BIOPSY      basal cell or squamous    SKIN CANCER EXCISION      chest, face      General Information       Row Name 10/27/23 0858          OT Time and Intention    Document Type evaluation  -JY     Mode of Treatment occupational therapy;individual therapy  -JY       Row Name 10/27/23 0858          General Information    Patient Profile Reviewed yes  -JY     Prior Level of Function min assist:;all household mobility;community mobility;gait;bed mobility;dressing;bathing;home management;cooking;cleaning;independent:;feeding;grooming;driving;work  back pain limited pt in more dynamic demands of mobility, LB ADLs and increased as work progressed; spouse A with home mgmt tasks; no AD used as baseline,  denies any falls  -JY     Existing Precautions/Restrictions fall;spinal  hemovac, hx breast cancer - able to have BP taken on both sides  -JY     Barriers to Rehab none identified  -JY       Row Name 10/27/23 0858          Occupational Profile    Environmental Supports and Barriers (Occupational Profile) walk in shower w/o seat, standard toilet height, DME: none used at baseline, none in storage  -JY       Row Name 10/27/23 0858          Living Environment    People in Home spouse;other (see comments)  self employed spouse who can be available to assist as needed at d/c  -JY       Row Name 10/27/23 0858          Home Main Entrance    Number of Stairs, Main Entrance two  -JY     Stair Railings, Main Entrance none  -JY       Row Name 10/27/23 0858          Stairs Within Home, Primary    Stairs, Within Home, Primary has a flight of stairs to upstairs of home however pt does not have to use at d/c, able to have needs met with bath, bedroom and kitchen all on main level  -JY     Number of Stairs, Within Home, Primary other (see comments)  -JY       Row Name 10/27/23 0858          Cognition    Orientation Status (Cognition) oriented x 4  -JY       Row Name 10/27/23 0858          Safety Issues, Functional Mobility    Safety Issues Affecting Function (Mobility) safety precaution awareness  -JY     Impairments Affecting Function (Mobility) pain  -JY     Comment, Safety Issues/Impairments (Mobility) pt alert and able to follow commands; demonstrated overall good general safety, was not able to state spinal precautions initially however competency improved with education and application  -JNICOLA               User Key  (r) = Recorded By, (t) = Taken By, (c) = Cosigned By      Initials Name Provider Type    Sridevi Chambers OT Occupational Therapist                     Mobility/ADL's       Row Name 10/27/23 0903          Bed Mobility    Bed Mobility other (see comments)  received out of bed in bathroom upon OT arrival  -GILSON      Comment, (Bed Mobility) received out of bed in bathroom upon OT arrival and pt preferred to sit UIC thus bed mobility not assessed this date; verbally reviewed hailey pt log roll tech to adhere to spinal precautions and pt able to return demo verbally and acknowledged understanding  -GILSON       Row Name 10/27/23 0903          Transfers    Transfers sit-stand transfer;stand-sit transfer;bed-chair transfer  -JY     Comment, (Transfers) skilled cues provided initially to ensure pt maintained same position in ascend, descend to adhere to spinal precautions largely decreasing forward flexion; initially had FWW in use however later w/o AD  -GILSON       Row Name 10/27/23 0903          Bed-Chair Transfer    Bed-Chair [place] (Transfers) contact guard;verbal cues  -JY     Assistive Device (Bed-Chair Transfers) other (see comments)  no AD used in bed > chair t/f  -GILSON       Row Name 10/27/23 0903          Sit-Stand Transfer    Sit-Stand [place] (Transfers) supervision;standby assist  -JY     Assistive Device (Sit-Stand Transfers) walker, front-wheeled;other (see comments)  no AD used  -JY     Comment, (Sit-Stand Transfer) initial use of FWW for STS t/f however in latter t/fs pt w/o AD and did not present with any LOB or need for use; will defer to PT for AD recs in further mobility assessment  -GILSON       Row Name 10/27/23 0903          Stand-Sit Transfer    Stand-Sit [place] (Transfers) supervision;standby assist  -JY     Assistive Device (Stand-Sit Transfers) walker, front-wheeled;other (see comments)  no AD used in latter STS t/fs  -GILSON       Row Name 10/27/23 0903          Functional Mobility    Functional Mobility- Ind. Level contact guard assist  -JY     Functional Mobility-Distance (Feet) --  in room ADL related mobility  -JY     Functional Mobility- Comment defer to PT for specifics however during in room ADL related mobility pt req'd gross CGA w/ intermittent SBA w/ initial use of FWW and later no AD used and no  overt LOB; review of optimal seq for safe turning/pivoting while maintaining spinal precautions  -JY       Row Name 10/27/23 0903          Activities of Daily Living    BADL Assessment/Intervention bathing;upper body dressing;lower body dressing;grooming;toileting  -JY       Row Name 10/27/23 0903          Bathing Assessment/Intervention    [place] Level (Bathing) lower body;distal lower extremities/feet  -JY     Assistive Devices (Bathing) long-handled sponge  -JY     Comment, (Bathing) issued  sponge, did not assess pt in authentic bathing however reviewed implications of spinal precautions on TBB, largely LBB; pt able to return demo use of  sponge in simulated reach with increased access for greater I and decreased pain  -JY       Row Name 10/27/23 0903          Upper Body Dressing Assessment/Training    [place] Level (Upper Body Dressing) doff;don;pajama/robe;contact guard assist;verbal cues  -JY     Position (Upper Body Dressing) unsupported sitting  -JY     Comment, (Upper Body Dressing) CGA for posterior mgmt of gown for tying and untying, pt able to complete while maintaining neutral back/spine position to adhere to precautions  -JY       Row Name 10/27/23 0903          Lower Body Dressing Assessment/Training    [place] Level (Lower Body Dressing) doff;don;socks;moderate assist (50% patient effort);verbal cues  -JY     Assistive Devices (Lower Body Dressing) sock-aid;long-handled shoe horn;reacher;other (see comments)  issued pt all AE and ed pt on each in prep for more I dressing while adhering to spinal precautions; facilitated some return demo of  reacher for picking up items however limited in use during d/d LB garments d/t need for hemovac to be addressed w/ RN  -JY     Position (Lower Body Dressing) unsupported sitting  -JY     Comment, (Lower Body Dressing) educated pt on optimal seq for threading and unthreading LB garments with consideration for more impacted LE if applicable,  educated pt on each AE and initiated return demo of each when hemovac had to be addressed w/ RN; limited authentic use of AE in dressing however pt presents with and verbalizes understanding in purpose; pt able to demo semi figure 4 position to reach LEs more proximally yet w/ increased pain thus AE preferred, anticipate greater I with actual use  -       Row Name 10/27/23 0903          Grooming Assessment/Training    [place] Level (Grooming) wash face, hands;oral care regimen;independent  -     Position (Grooming) supported standing;sink side  -       Row Name 10/27/23 0903          Toileting Assessment/Training    [place] Level (Toileting) not tested  -     Comment, (Toileting) u/a to assess pt complete toileting as completed prior to OT arrival however educated pt on adhering to spinal precautions with posterior hygiene, pt declined toilet aid and verbalized understanding on modified position  -               User Key  (r) = Recorded By, (t) = Taken By, (c) = Cosigned By      Initials Name Provider Type    Sridevi Chambers OT Occupational Therapist                   Obj/Interventions       [facility] Name 10/27/23 1013          Sensory Assessment (Somatosensory)    Sensory Assessment (Somatosensory) bilateral UE;sensation intact  -     Bilateral UE Sensory Assessment general sensation;light touch awareness;intact  -     Sensory Assessment denies any numbness or tingling and able to recognize LT stimuli as intact and symmetrical at BUEs  -[facility] Name 10/27/23 1013          Vision Assessment/Intervention    Visual Impairment/Limitations corrective lenses full-time  -     Vision Assessment Comment denies any acute changes to vision  -[facility] Name 10/27/23 1013          Range of Motion Comprehensive    General Range of Motion bilateral upper extremity ROM WFL  -[facility] Name 10/27/23 1013          Strength Comprehensive (MMT)    General Manual Muscle Testing (MMT) Assessment upper  extremity strength deficits identified  -JY     Comment, General Manual Muscle Testing (MMT) Assessment formal MMT not completed this date d/t recent back sx however based on observation pt w/ at least 4/5 MMS, hand grasp suggests 4+/5  -JY       Row Name 10/27/23 1013          Motor Skills    Motor Skills functional endurance;coordination  -JY     Coordination bilateral;upper extremity;finger to nose;other (see comments);WFL  finger thumb opposition  -JY     Functional Endurance mild decrease in activity tolerance toward more dynamic tasks; SPO2 > 90% throughout on RA  -JY       Row Name 10/27/23 1013          Balance    Balance Assessment sitting static balance;sitting dynamic balance;standing static balance;standing dynamic balance  -JY     Static Sitting Balance independent  -JY     Dynamic Sitting Balance independent  -JY     Position, Sitting Balance unsupported;sitting in chair  -JY     Static Standing Balance standby assist  -JY     Dynamic Standing Balance contact guard;standby assist  -JY     Position/Device Used, Standing Balance supported;unsupported;other (see comments)  initially trialled w/ wx, later w/o AD  -JY     Balance Interventions sitting;standing;static;dynamic;sit to stand;supported;occupation based/functional task  -JY     Comment, Balance no LOB noted  -JY               User Key  (r) = Recorded By, (t) = Taken By, (c) = Cosigned By      Initials Name Provider Type    Sridevi Chambers OT Occupational Therapist                   Goals/Plan       Row Name 10/27/23 1023          Transfer Goal 1 (OT)    Activity/Assistive Device (Transfer Goal 1, OT) sit-to-stand/stand-to-sit;bed-to-chair/chair-to-bed;toilet;commode, bedside without drop arms;other (see comments)  AD recs per PT  -JY     [place] Level/Cues Needed (Transfer Goal 1, OT) supervision required  -JY     Time Frame (Transfer Goal 1, OT) long term goal (LTG);by discharge  -JY     Progress/Outcome (Transfer Goal 1, OT) new goal   -JY       Row Name 10/27/23 1023          Dressing Goal 1 (OT)    Activity/Device (Dressing Goal 1, OT) lower body dressing;other (see comments)  d/d LB garments with AE PRN while adhering to spinal precautions  -JY     [place]/Cues Needed (Dressing Goal 1, OT) standby assist;verbal cues required  -JY     Time Frame (Dressing Goal 1, OT) long term goal (LTG);by discharge  -JY     Progress/Outcome (Dressing Goal 1, OT) new goal  -JY       Row Name 10/27/23 1023          Toileting Goal 1 (OT)    Activity/Device (Toileting Goal 1, OT) adjust/manage clothing;perform perineal hygiene;commode;commode, bedside without drop arms;grab bar/safety frame  -JY     [place] Level/Cues Needed (Toileting Goal 1, OT) supervision required;independent  -JY     Time Frame (Toileting Goal 1, OT) long term goal (LTG);by discharge  -JY     Progress/Outcome (Toileting Goal 1, OT) new goal  -JY       Row Name 10/27/23 1023          Strength Goal 1 (OT)    Strength Goal 1 (OT) Pt to complete seated HEP encompassing BUEs targeting strength and endurance w/ progressive sets/reps/resistance in order to improve integration in ADLs, related t/fs, mobility  -JY     Time Frame (Strength Goal 1, OT) long term goal (LTG);by discharge  -JY     Progress/Outcome (Strength Goal 1, OT) new goal  -       Row Name 10/27/23 1023          Therapy Assessment/Plan (OT)    Planned Therapy Interventions (OT) activity tolerance training;adaptive equipment training;BADL retraining;functional balance retraining;occupation/activity based interventions;patient/caregiver education/training;ROM/therapeutic exercise;strengthening exercise;transfer/mobility retraining  -JY               User Key  (r) = Recorded By, (t) = Taken By, (c) = Cosigned By      Initials Name Provider Type    Sridevi Chambers, OT Occupational Therapist                   Clinical Impression       Row Name 10/27/23 1017          Pain Assessment    Pretreatment Pain Rating 3/10  -JY      Posttreatment Pain Rating 3/10  -JY     Pain Location - Side/Orientation Bilateral  -JY     Pain Location incisional  -JY     Pain Location - back  -JY     Pre/Posttreatment Pain Comment persistent incisional back pain, able to tolerate OT interventions  -JY     Pain Intervention(s) Repositioned;Ambulation/increased activity;Rest  -JY       Row Name 10/27/23 1017          Plan of Care Review    Plan of Care Reviewed With patient  -JY     Progress improving  -JY     Outcome Evaluation OT evaluation completed. Postoperatively, pt presents with decreased I in ADLs, related t/fs, mobility compared to PLOF limited by decreased activity tolerance, mild impaired balance deficits, incisional pain and decreased distal reach impacting LB ADLs. OT issued  AE to assist with LBD while adhering to spinal preautions, pt competency in precautions improved as did optimal seq for threading and unthreading and access. Anticipate greater I in ADLs w/ use of AE. Pt req'd SBA in STS t/fs at different surfaces and CGA progressed to SBA in fxl mobility initially with AE and later w/o and no overt LOB. Pt is below occuaptional performance and would benefit from IPOT POC and home w/ A at d/c when medically ready.  -JY       Row Name 10/27/23 1017          Therapy Assessment/Plan (OT)    Patient/Family Therapy Goal Statement (OT) to maximize I in ADLs, related t/fs, mobility, return to PLOF  -JY     Rehab Potential (OT) good, to achieve stated therapy goals  -JY     Criteria for Skilled Therapeutic Interventions Met (OT) yes;skilled treatment is necessary  -JY     Therapy Frequency (OT) daily  -JY       Row Name 10/27/23 1017          Therapy Plan Review/Discharge Plan (OT)    Equipment Needs Upon Discharge (OT) dressing equipment;bathing equipment  -JY     Anticipated Discharge Disposition (OT) home with assist  -JY       Row Name 10/27/23 1017          Vital Signs    Pre Systolic BP Rehab 92  -JY     Pre Treatment Diastolic BP 64  -JY      Post Systolic BP Rehab 118  -JY     Post Treatment Diastolic BP 73  -JY     Pretreatment Heart Rate (beats/min) 68  -JY     Posttreatment Heart Rate (beats/min) 65  -JY     Pre SpO2 (%) 95  -JY     O2 Delivery Pre Treatment room air  -JY     O2 Delivery Intra Treatment room air  -JY     Post SpO2 (%) 97  -JY     O2 Delivery Post Treatment room air  -JY     Pre Patient Position Standing  -JY     Intra Patient Position Sitting  -JY     Post Patient Position Sitting  -JY       Row Name 10/27/23 1017          Positioning and Restraints    Pre-Treatment Position bathroom  -JY     Post Treatment Position chair  -JY     In Chair notified nsg;reclined;call light within reach;encouraged to call for assist;exit alarm on;waffle cushion;legs elevated  RN w/ plan to address hemovac following OT departure  -JY               User Key  (r) = Recorded By, (t) = Taken By, (c) = Cosigned By      Initials Name Provider Type    Sridevi Chambers OT Occupational Therapist                   Outcome Measures       Row Name 10/27/23 1026          How much help from another is currently needed...    Putting on and taking off regular lower body clothing? 3  -JY     Bathing (including washing, rinsing, and drying) 3  -JY     Toileting (which includes using toilet bed pan or urinal) 3  -JY     Putting on and taking off regular upper body clothing 3  -JY     Taking care of personal grooming (such as brushing teeth) 4  -JY     Eating meals 4  -JY     AM-PAC 6 Clicks Score (OT) 20  -JY       Row Name 10/27/23 0957 10/27/23 0202       How much help from another person do you currently need...    Turning from your back to your side while in flat bed without using bedrails? 4  -CM 4  -TA    Moving from lying on back to sitting on the side of a flat bed without bedrails? 3  -CM 3  -TA    Moving to and from a bed to a chair (including a wheelchair)? 3  -CM 3  -TA    Standing up from a chair using your arms (e.g., wheelchair, bedside chair)? 3  -CM 3   -TA    Climbing 3-5 steps with a railing? 3  -CM 3  -TA    To walk in hospital room? 3  -CM 3  -TA    AM-PAC 6 Clicks Score (PT) 19  -CM 19  -TA    Highest level of mobility 6 --> Walked 10 steps or more  -CM 6 --> Walked 10 steps or more  -TA      Row Name 10/27/23 1026 10/27/23 0957       Functional Assessment    Outcome Measure Options AM-PAC 6 Clicks Daily Activity (OT)  -GILSON AM-PAC 6 Clicks Basic Mobility (PT)  -CM              User Key  (r) = Recorded By, (t) = Taken By, (c) = Cosigned By      Initials Name Provider Type    Sridevi Chambers OT Occupational Therapist    Elsie White, PT Physical Therapist    Bryce Vazquez, RN Registered Nurse                    Occupational Therapy Education       Title: PT OT SLP Therapies (In Progress)       Topic: Occupational Therapy (In Progress)       Point: ADL training (In Progress)       Description:   Instruct learner(s) on proper safety adaptation and remediation techniques during self care or transfers.   Instruct in proper use of assistive devices.                  Learning Progress Summary             Patient Acceptance, E,D, NR by GILSON at 10/27/2023 0807                         Point: Home exercise program (Not Started)       Description:   Instruct learner(s) on appropriate technique for monitoring, assisting and/or progressing therapeutic exercises/activities.                  Learner Progress:  Not documented in this visit.              Point: Precautions (In Progress)       Description:   Instruct learner(s) on prescribed precautions during self-care and functional transfers.                  Learning Progress Summary             Patient Acceptance, E,D, NR by GILSON at 10/27/2023 0807                         Point: Body mechanics (In Progress)       Description:   Instruct learner(s) on proper positioning and spine alignment during self-care, functional mobility activities and/or exercises.                  Learning Progress Summary              Patient Acceptance, E,D, NR by GILSON at 10/27/2023 0807                                         User Key       Initials Effective Dates Name Provider Type Discipline    GILSON 06/16/21 -  Sridevi Butcher OT Occupational Therapist OT                  OT Recommendation and Plan  Planned Therapy Interventions (OT): activity tolerance training, adaptive equipment training, BADL retraining, functional balance retraining, occupation/activity based interventions, patient/caregiver education/training, ROM/therapeutic exercise, strengthening exercise, transfer/mobility retraining  Therapy Frequency (OT): daily  Plan of Care Review  Plan of Care Reviewed With: patient  Progress: improving  Outcome Evaluation: OT evaluation completed. Postoperatively, pt presents with decreased I in ADLs, related t/fs, mobility compared to PLOF limited by decreased activity tolerance, mild impaired balance deficits, incisional pain and decreased distal reach impacting LB ADLs. OT issued  AE to assist with LBD while adhering to spinal preautions, pt competency in precautions improved as did optimal seq for threading and unthreading and access. Anticipate greater I in ADLs w/ use of AE. Pt req'd SBA in STS t/fs at different surfaces and CGA progressed to SBA in fxl mobility initially with AE and later w/o and no overt LOB. Pt is below occuaptional performance and would benefit from IPOT POC and home w/ A at d/c when medically ready.     Time Calculation:   Evaluation Complexity (OT)  Review Occupational Profile/Medical/Therapy History Complexity: brief/low complexity  Assessment, Occupational Performance/Identification of Deficit Complexity: 1-3 performance deficits  Clinical Decision Making Complexity (OT): problem focused assessment/low complexity  Overall Complexity of Evaluation (OT): low complexity     Time Calculation- OT       Row Name 10/27/23 1029             Time Calculation- OT    OT Start Time 0807  -JY      OT Received On 10/27/23  -JNICOLA       OT Goal Re-Cert Due Date 11/06/23  -JY         Timed Charges    44991 - OT Therapeutic Activity Minutes 12  -JY      55835 - OT Self Care/Mgmt Minutes 15  -JY         Untimed Charges    OT Eval/Re-eval Minutes 50  -JY         Total Minutes    Timed Charges Total Minutes 27  -JY      Untimed Charges Total Minutes 50  -JY       Total Minutes 77  -JY                User Key  (r) = Recorded By, (t) = Taken By, (c) = Cosigned By      Initials Name Provider Type    Sridevi Chambers OT Occupational Therapist                  Therapy Charges for Today       Code Description Service Date Service Provider Modifiers Qty    83181553143 HC OT THERAPEUTIC ACT EA 15 MIN 10/27/2023 Sridevi Butcher OT GO 1    00616016027 HC OT SELF CARE/MGMT/TRAIN EA 15 MIN 10/27/2023 Sridevi Butcher OT GO 1    79408050732 HC OT EVAL LOW COMPLEXITY 4 10/27/2023 Sridevi Butcher OT GO 1                 Sridevi Butcher OT  10/27/2023

## 2023-10-27 NOTE — PLAN OF CARE
Goal Outcome Evaluation:  Plan of Care Reviewed With: patient           Outcome Evaluation: Patient presents with mild pain and mild deficits in strength and balance. She was able to ambulate 400' SBA initially with FWW progressing to no AD. She navigated 10 steps CGA without difficulty. Precautions and HEP reviewed. IPPT indicated to address current deficits. Recommend D/C home with assist when medically appropriate.      Anticipated Discharge Disposition (PT): home with assist

## 2023-10-27 NOTE — PROGRESS NOTES
"NEUROSURGERY PROGRESS NOTE     LOS: 1 day   Patient Care Team:  Gertrude Suero PA-C as PCP - General (Physician Assistant)  Concepcion Otto MD as Referring Physician (General Surgery)  Saumya Call MD as Consulting Physician (Radiation Oncology)  Amy Santillan MD as Consulting Physician (Hematology and Oncology)  Kim Mishra APRN as Nurse Practitioner (Oncology)  Rachelle Sultana APRN as Nurse Practitioner (Nurse Practitioner)  Sergio Jama MD as Consulting Physician (Pain Medicine)  Josseline Howell RN as Ambulatory  ([facility])    Chief Complaint: Low back and left leg pain.    POD#: 1 Day Post-Op  Procedures:  L4-5 PLIF.    Interval History:   Patient Complaints: Incisional pain.  Patient Denies: Preoperative radicular pain.    Vital Signs: Blood pressure 97/55, pulse 69, temperature 98.3 °F (36.8 °C), temperature source Oral, resp. rate 16, height 167.6 cm (66\"), weight 67.1 kg (148 lb), SpO2 97%, not currently breastfeeding.  Intake/Output:   Intake/Output Summary (Last 24 hours) at 10/27/2023 0606  Last data filed at 10/26/2023 2326  Gross per 24 hour   Intake 2670 ml   Output 995 ml   Net 1675 ml     Drain output: 115/70 mL    Physical Exam:  The patient awakens easily.  She is in good spirits  Motor function is intact in her lower extremities.  Dry dressing is in place on her incision.     Data Review:    H&H pending.    Assessment/Plan:  1.  L4-5 spondylolisthesis, stenosis, instability, synovial cyst status post decompression with fusion and stabilization.  2.  History of breast cancer.  3.  Disposition: Mobilize patient.  I anticipate that she will be discharged home over the weekend.    Jose Farmer MD  10/27/23  06:06 EDT    "

## 2023-10-27 NOTE — PLAN OF CARE
Problem: Adult Inpatient Plan of Care  Goal: Plan of Care Review  Outcome: Ongoing, Progressing  Goal: Patient-Specific Goal (Individualized)  Outcome: Ongoing, Progressing  Goal: Absence of Hospital-Acquired Illness or Injury  Outcome: Ongoing, Progressing  Intervention: Identify and Manage Fall Risk  Recent Flowsheet Documentation  Taken 10/27/2023 0355 by Bryce Holder RN  Safety Promotion/Fall Prevention:   activity supervised   assistive device/personal items within reach   gait belt   nonskid shoes/slippers when out of bed   room organization consistent   safety round/check completed  Taken 10/27/2023 0202 by Bryce Holder RN  Safety Promotion/Fall Prevention:   activity supervised   assistive device/personal items within reach   clutter free environment maintained   nonskid shoes/slippers when out of bed   room organization consistent   safety round/check completed  Taken 10/27/2023 0008 by Bryce Holder RN  Safety Promotion/Fall Prevention:   activity supervised   assistive device/personal items within reach   nonskid shoes/slippers when out of bed   room organization consistent   safety round/check completed  Taken 10/26/2023 2207 by Bryce Holder RN  Safety Promotion/Fall Prevention:   activity supervised   assistive device/personal items within reach   clutter free environment maintained   gait belt   nonskid shoes/slippers when out of bed   room organization consistent   safety round/check completed  Taken 10/26/2023 2000 by Bryce Holder RN  Safety Promotion/Fall Prevention:   assistive device/personal items within reach   clutter free environment maintained   gait belt   nonskid shoes/slippers when out of bed   room organization consistent   safety round/check completed  Intervention: Prevent Skin Injury  Recent Flowsheet Documentation  Taken 10/27/2023 0355 by Bryce Holder RN  Body Position: position changed independently  Taken 10/27/2023 0202 by Bryce Holder RN  Body Position:  position changed independently  Taken 10/27/2023 0008 by Bryce Holder RN  Body Position: position changed independently  Taken 10/26/2023 2207 by Bryce Holder RN  Body Position: position changed independently  Taken 10/26/2023 2000 by Bryce Holder RN  Body Position: position changed independently  Intervention: Prevent and Manage VTE (Venous Thromboembolism) Risk  Recent Flowsheet Documentation  Taken 10/27/2023 0355 by Bryce Holder RN  Activity Management: activity encouraged  VTE Prevention/Management:   bilateral   sequential compression devices on  Taken 10/27/2023 0202 by Bryce Holder RN  VTE Prevention/Management:   bilateral   sequential compression devices on  Taken 10/27/2023 0008 by Bryce Holder RN  VTE Prevention/Management:   bilateral   sequential compression devices on  Taken 10/26/2023 2207 by Bryce Holder RN  Activity Management: activity encouraged  VTE Prevention/Management:   bilateral   sequential compression devices on  Taken 10/26/2023 2000 by Bryce Holder RN  Activity Management: activity encouraged  VTE Prevention/Management:   bilateral   sequential compression devices on  Range of Motion: active ROM (range of motion) encouraged  Intervention: Prevent Infection  Recent Flowsheet Documentation  Taken 10/27/2023 0355 by Bryce Holder RN  Infection Prevention:   environmental surveillance performed   hand hygiene promoted   rest/sleep promoted   single patient room provided  Taken 10/27/2023 0202 by Bryce Holder RN  Infection Prevention:   environmental surveillance performed   personal protective equipment utilized   single patient room provided   visitors restricted/screened  Taken 10/27/2023 0008 by Bryce Holder RN  Infection Prevention:   environmental surveillance performed   hand hygiene promoted   rest/sleep promoted   single patient room provided  Taken 10/26/2023 2000 by Bryce Holder RN  Infection Prevention:   environmental surveillance performed    hand hygiene promoted   rest/sleep promoted   visitors restricted/screened  Goal: Optimal Comfort and Wellbeing  Outcome: Ongoing, Progressing  Intervention: Monitor Pain and Promote Comfort  Recent Flowsheet Documentation  Taken 10/27/2023 0008 by Bryce Holder RN  Pain Management Interventions: pillow support provided  Taken 10/26/2023 2207 by Bryce Holder RN  Pain Management Interventions: see MAR  Intervention: Provide Person-Centered Care  Recent Flowsheet Documentation  Taken 10/26/2023 2000 by Bryce Holder RN  Trust Relationship/Rapport:   care explained   choices provided   questions answered   questions encouraged  Goal: Readiness for Transition of Care  Outcome: Ongoing, Progressing     Problem: Pain Acute  Goal: Acceptable Pain Control and Functional Ability  Outcome: Ongoing, Progressing  Intervention: Prevent or Manage Pain  Recent Flowsheet Documentation  Taken 10/27/2023 0355 by Bryce Holder RN  Medication Review/Management: medications reviewed  Taken 10/27/2023 0202 by Bryce Holder RN  Medication Review/Management: medications reviewed  Taken 10/27/2023 0008 by Bryce Holder RN  Medication Review/Management: medications reviewed  Taken 10/26/2023 2207 by Bryce Holder RN  Sleep/Rest Enhancement: awakenings minimized  Medication Review/Management: medications reviewed  Taken 10/26/2023 2000 by Bryce Holder RN  Sleep/Rest Enhancement:   awakenings minimized   regular sleep/rest pattern promoted   relaxation techniques promoted  Medication Review/Management: medications reviewed  Intervention: Develop Pain Management Plan  Recent Flowsheet Documentation  Taken 10/27/2023 0008 by Bryce Holder RN  Pain Management Interventions: pillow support provided  Taken 10/26/2023 2207 by Bryce Holder RN  Pain Management Interventions: see MAR  Intervention: Optimize Psychosocial Wellbeing  Recent Flowsheet Documentation  Taken 10/26/2023 2000 by Bryce Holder RN  Supportive Measures:    active listening utilized   self-care encouraged  Diversional Activities: television   Goal Outcome Evaluation:

## 2023-10-28 ENCOUNTER — READMISSION MANAGEMENT (OUTPATIENT)
Dept: CALL CENTER | Facility: HOSPITAL | Age: 63
End: 2023-10-28
Payer: COMMERCIAL

## 2023-10-28 VITALS
WEIGHT: 148 LBS | BODY MASS INDEX: 23.78 KG/M2 | HEART RATE: 64 BPM | OXYGEN SATURATION: 96 % | SYSTOLIC BLOOD PRESSURE: 154 MMHG | DIASTOLIC BLOOD PRESSURE: 80 MMHG | TEMPERATURE: 98.2 F | HEIGHT: 66 IN | RESPIRATION RATE: 18 BRPM

## 2023-10-28 PROCEDURE — 97116 GAIT TRAINING THERAPY: CPT

## 2023-10-28 PROCEDURE — 99024 POSTOP FOLLOW-UP VISIT: CPT | Performed by: NEUROLOGICAL SURGERY

## 2023-10-28 PROCEDURE — 97110 THERAPEUTIC EXERCISES: CPT

## 2023-10-28 RX ORDER — DIPHENHYDRAMINE HCL 25 MG
25 CAPSULE ORAL NIGHTLY PRN
Qty: 30 CAPSULE | Refills: 0 | Status: SHIPPED | OUTPATIENT
Start: 2023-10-28

## 2023-10-28 RX ORDER — OXYCODONE HYDROCHLORIDE AND ACETAMINOPHEN 5; 325 MG/1; MG/1
1 TABLET ORAL 3 TIMES DAILY PRN
Qty: 15 TABLET | Refills: 0 | Status: SHIPPED | OUTPATIENT
Start: 2023-10-28

## 2023-10-28 RX ORDER — ACETAMINOPHEN 325 MG/1
650 TABLET ORAL 3 TIMES DAILY
Qty: 120 TABLET | Refills: 0 | Status: SHIPPED | OUTPATIENT
Start: 2023-10-28

## 2023-10-28 RX ADMIN — DOCUSATE SODIUM 100 MG: 100 CAPSULE, LIQUID FILLED ORAL at 09:10

## 2023-10-28 RX ADMIN — IBUPROFEN 600 MG: 600 TABLET, FILM COATED ORAL at 14:11

## 2023-10-28 RX ADMIN — FAMOTIDINE 20 MG: 20 TABLET, FILM COATED ORAL at 09:09

## 2023-10-28 RX ADMIN — Medication 10 ML: at 09:10

## 2023-10-28 RX ADMIN — IBUPROFEN 600 MG: 600 TABLET, FILM COATED ORAL at 05:46

## 2023-10-28 RX ADMIN — ACETAMINOPHEN 650 MG: 325 TABLET ORAL at 09:09

## 2023-10-28 RX ADMIN — Medication 1 CAPSULE: at 09:10

## 2023-10-28 NOTE — OUTREACH NOTE
Prep Survey      Flowsheet Row Responses   [facility] patient discharged from? [place]   Is LACE score < 7 ? Yes   Eligibility [facility]   Date of Admission 10/26/23   Date of Discharge 10/28/23   Discharge Disposition Home or Self Care   Discharge diagnosis LUMBAR FUSION DECOMPRESSON WITH PEDICLE SCREWS L4-5   Does the patient have one of the following disease processes/diagnoses(primary or secondary)? General Surgery   Does the patient have Home health ordered? No   Is there a DME ordered? No   Prep survey completed? Yes            Xiao JAIMES - Registered Nurse

## 2023-10-28 NOTE — CASE MANAGEMENT/SOCIAL WORK
Continued Stay Note  [facility]     Patient Name: Samanta Mera  MRN: [id number]  Today's Date: 10/28/2023    Admit Date: 10/26/2023    Plan: Home   Discharge Plan       Row Name 10/28/23 0838       Plan    Plan Home    Patient/Family in Agreement with Plan yes    Final Discharge Disposition Code 01 - home or self-care    Final Note Plan is home. Family will transport. No discharge needs identified.                   Discharge Codes    No documentation.                 Expected Discharge Date and Time       Expected Discharge Date Expected Discharge Time    Oct 28, 2023               Kevyn Dan RN

## 2023-10-28 NOTE — PLAN OF CARE
Problem: Adult Inpatient Plan of Care  Goal: Plan of Care Review  Outcome: Ongoing, Progressing  Goal: Patient-Specific Goal (Individualized)  Outcome: Ongoing, Progressing  Goal: Absence of Hospital-Acquired Illness or Injury  Outcome: Ongoing, Progressing  Intervention: Identify and Manage Fall Risk  Recent Flowsheet Documentation  Taken 10/28/2023 0618 by Bryce Holder RN  Safety Promotion/Fall Prevention:   activity supervised   assistive device/personal items within reach   clutter free environment maintained   gait belt   nonskid shoes/slippers when out of bed   room organization consistent   safety round/check completed  Taken 10/28/2023 0215 by Bryce Holder RN  Safety Promotion/Fall Prevention:   activity supervised   assistive device/personal items within reach   clutter free environment maintained   gait belt   nonskid shoes/slippers when out of bed   room organization consistent   safety round/check completed  Taken 10/28/2023 0214 by Bryce Holder RN  Safety Promotion/Fall Prevention:   activity supervised   assistive device/personal items within reach   clutter free environment maintained   gait belt   nonskid shoes/slippers when out of bed   room organization consistent   safety round/check completed  Taken 10/28/2023 0013 by Bryce Holder RN  Safety Promotion/Fall Prevention:   activity supervised   assistive device/personal items within reach   clutter free environment maintained   gait belt   nonskid shoes/slippers when out of bed   room organization consistent   safety round/check completed  Taken 10/27/2023 2200 by Bryce Holder RN  Safety Promotion/Fall Prevention:   activity supervised   assistive device/personal items within reach   clutter free environment maintained   gait belt   nonskid shoes/slippers when out of bed   room organization consistent   safety round/check completed  Taken 10/27/2023 1950 by Bryce Holder RN  Safety Promotion/Fall Prevention:   activity  supervised   assistive device/personal items within reach   gait belt   nonskid shoes/slippers when out of bed   room organization consistent   safety round/check completed  Intervention: Prevent Skin Injury  Recent Flowsheet Documentation  Taken 10/28/2023 0618 by Bryce Holder RN  Body Position: position changed independently  Taken 10/28/2023 0215 by Bryce Holder RN  Body Position: position changed independently  Taken 10/28/2023 0214 by Bryce Holder RN  Body Position: position changed independently  Taken 10/28/2023 0013 by Bryce Holder RN  Body Position: position changed independently  Taken 10/27/2023 2200 by Bryce Holder RN  Body Position: position changed independently  Taken 10/27/2023 1950 by Bryce Holder RN  Body Position: position changed independently  Intervention: Prevent and Manage VTE (Venous Thromboembolism) Risk  Recent Flowsheet Documentation  Taken 10/28/2023 0618 by Bryce Holder RN  Activity Management: activity minimized  VTE Prevention/Management:   sequential compression devices on   bilateral  Taken 10/28/2023 0215 by Bryce Holder RN  Activity Management: activity encouraged  VTE Prevention/Management:   sequential compression devices on   bilateral  Taken 10/28/2023 0214 by Bryce Holder RN  VTE Prevention/Management:   bilateral   sequential compression devices on  Taken 10/28/2023 0013 by Bryce Holder RN  VTE Prevention/Management:   bilateral   sequential compression devices on  Taken 10/27/2023 2200 by Bryce Holder RN  VTE Prevention/Management:   bilateral   sequential compression devices on  Taken 10/27/2023 1950 by Bryce Holder RN  Activity Management: activity encouraged  VTE Prevention/Management:   bilateral   sequential compression devices on  Range of Motion: active ROM (range of motion) encouraged  Intervention: Prevent Infection  Recent Flowsheet Documentation  Taken 10/28/2023 0618 by Bryce Holder RN  Infection Prevention:   environmental  surveillance performed   hand hygiene promoted   rest/sleep promoted   single patient room provided  Taken 10/28/2023 0215 by Bryce Holder RN  Infection Prevention:   environmental surveillance performed   hand hygiene promoted   rest/sleep promoted   single patient room provided  Taken 10/28/2023 0214 by Bryce Holder RN  Infection Prevention:   environmental surveillance performed   rest/sleep promoted   single patient room provided  Taken 10/28/2023 0013 by Bryce Holder RN  Infection Prevention:   environmental surveillance performed   hand hygiene promoted   rest/sleep promoted   single patient room provided  Taken 10/27/2023 2200 by Bryce Holder RN  Infection Prevention:   environmental surveillance performed   rest/sleep promoted   single patient room provided  Taken 10/27/2023 1950 by Bryce Holder RN  Infection Prevention:   environmental surveillance performed   hand hygiene promoted   rest/sleep promoted   single patient room provided  Goal: Optimal Comfort and Wellbeing  Outcome: Ongoing, Progressing  Intervention: Monitor Pain and Promote Comfort  Recent Flowsheet Documentation  Taken 10/28/2023 0618 by Bryce Holder RN  Pain Management Interventions: see MAR  Taken 10/27/2023 2200 by Bryce Holder RN  Pain Management Interventions: see MAR  Taken 10/27/2023 1950 by Bryce Holder RN  Pain Management Interventions: no interventions per patient request  Intervention: Provide Person-Centered Care  Recent Flowsheet Documentation  Taken 10/27/2023 1950 by Bryce Holder RN  Trust Relationship/Rapport:   care explained   questions encouraged  Goal: Readiness for Transition of Care  Outcome: Ongoing, Progressing     Problem: Pain Acute  Goal: Acceptable Pain Control and Functional Ability  Outcome: Ongoing, Progressing  Intervention: Prevent or Manage Pain  Recent Flowsheet Documentation  Taken 10/28/2023 0618 by Bryce Holder RN  Medication Review/Management: medications reviewed  Taken  10/28/2023 0215 by Bryce Holder RN  Medication Review/Management: medications reviewed  Taken 10/28/2023 0214 by Bryce Holder RN  Medication Review/Management: medications reviewed  Taken 10/28/2023 0013 by Bryce Holder RN  Medication Review/Management: medications reviewed  Taken 10/27/2023 2200 by Bryce Holder RN  Medication Review/Management: medications reviewed  Taken 10/27/2023 1950 by Bryce Holder RN  Sleep/Rest Enhancement:   awakenings minimized   consistent schedule promoted  Medication Review/Management: medications reviewed  Intervention: Develop Pain Management Plan  Recent Flowsheet Documentation  Taken 10/28/2023 0618 by Bryce Holder RN  Pain Management Interventions: see MAR  Taken 10/27/2023 2200 by Bryce Holder RN  Pain Management Interventions: see MAR  Taken 10/27/2023 1950 by Bryce Holder RN  Pain Management Interventions: no interventions per patient request  Intervention: Optimize Psychosocial Wellbeing  Recent Flowsheet Documentation  Taken 10/27/2023 1950 by Bryce Holder RN  Supportive Measures: active listening utilized  Diversional Activities:   smartphone   television     Problem: Bleeding (Spinal Surgery)  Goal: Absence of Bleeding  Outcome: Ongoing, Progressing  Intervention: Monitor and Manage Bleeding  Recent Flowsheet Documentation  Taken 10/28/2023 0214 by Bryce Holder RN  Bleeding Management: dressing monitored  Taken 10/27/2023 1950 by Bryce Holder RN  Bleeding Management: dressing monitored     Problem: Bowel Motility Impaired (Spinal Surgery)  Goal: Effective Bowel Elimination  Outcome: Ongoing, Progressing     Problem: Fluid and Electrolyte Imbalance (Spinal Surgery)  Goal: Fluid and Electrolyte Balance  Outcome: Ongoing, Progressing     Problem: Functional Ability Impaired (Spinal Surgery)  Goal: Optimal Functional Ability  Outcome: Ongoing, Progressing  Intervention: Optimize Functional Status  Recent Flowsheet Documentation  Taken 10/28/2023  0618 by Holder, Bryce, RN  Activity Management: activity minimized  Positioning/Transfer Devices:   pillows   in use  Taken 10/28/2023 0215 by Bryce Holder RN  Activity Management: activity encouraged  Positioning/Transfer Devices:   pillows   in use  Taken 10/28/2023 0214 by Bryce Holder RN  Positioning/Transfer Devices:   pillows   in use  Taken 10/28/2023 0013 by Bryce Holder RN  Positioning/Transfer Devices:   pillows   in use  Taken 10/27/2023 2200 by Bryce Holder RN  Positioning/Transfer Devices:   pillows   in use  Taken 10/27/2023 1950 by Bryce Holder RN  Activity Management: activity encouraged  Positioning/Transfer Devices:   pillows   in use     Problem: Infection (Spinal Surgery)  Goal: Absence of Infection Signs and Symptoms  Outcome: Ongoing, Progressing  Intervention: Prevent or Manage Infection  Recent Flowsheet Documentation  Taken 10/28/2023 0618 by Bryce Holder RN  Infection Prevention:   environmental surveillance performed   hand hygiene promoted   rest/sleep promoted   single patient room provided  Taken 10/28/2023 0215 by Bryce Holder RN  Infection Prevention:   environmental surveillance performed   hand hygiene promoted   rest/sleep promoted   single patient room provided  Taken 10/28/2023 0214 by Bryce Holder RN  Infection Prevention:   environmental surveillance performed   rest/sleep promoted   single patient room provided  Taken 10/28/2023 0013 by Bryce Holder RN  Infection Prevention:   environmental surveillance performed   hand hygiene promoted   rest/sleep promoted   single patient room provided  Taken 10/27/2023 2200 by Bryce Holder RN  Infection Prevention:   environmental surveillance performed   rest/sleep promoted   single patient room provided  Taken 10/27/2023 1950 by Bryce Holder RN  Infection Prevention:   environmental surveillance performed   hand hygiene promoted   rest/sleep promoted   single patient room provided     Problem: Neurologic  Impairment (Spinal Surgery)  Goal: Optimal Neurologic Function  Outcome: Ongoing, Progressing  Intervention: Optimize Neurologic Function  Recent Flowsheet Documentation  Taken 10/28/2023 0618 by Bryce Holder RN  Body Position: position changed independently  Taken 10/28/2023 0215 by Bryce Holder RN  Body Position: position changed independently  Taken 10/28/2023 0214 by Bryce Holder RN  Body Position: position changed independently  Taken 10/28/2023 0013 by Bryce Holder RN  Body Position: position changed independently  Taken 10/27/2023 2200 by Bryce Holder RN  Body Position: position changed independently  Taken 10/27/2023 1950 by Bryce Holder RN  Body Position: position changed independently  Pressure Reduction Devices: positioning supports utilized  Range of Motion: active ROM (range of motion) encouraged     Problem: Ongoing Anesthesia Effects (Spinal Surgery)  Goal: Anesthesia/Sedation Recovery  Outcome: Ongoing, Progressing  Intervention: Optimize Anesthesia Recovery  Recent Flowsheet Documentation  Taken 10/28/2023 0618 by Bryce Holder RN  Patient Tolerance (IS): good  Safety Promotion/Fall Prevention:   activity supervised   assistive device/personal items within reach   clutter free environment maintained   gait belt   nonskid shoes/slippers when out of bed   room organization consistent   safety round/check completed  Taken 10/28/2023 0551 by Bryce Holder RN  Administration (IS): self-administered  Taken 10/28/2023 0215 by Bryce Holder RN  Patient Tolerance (IS): good  Safety Promotion/Fall Prevention:   activity supervised   assistive device/personal items within reach   clutter free environment maintained   gait belt   nonskid shoes/slippers when out of bed   room organization consistent   safety round/check completed  Taken 10/28/2023 0214 by Bryce Holder RN  Patient Tolerance (IS): good  Safety Promotion/Fall Prevention:   activity supervised   assistive device/personal  items within reach   clutter free environment maintained   gait belt   nonskid shoes/slippers when out of bed   room organization consistent   safety round/check completed  Taken 10/28/2023 0013 by Bryce Holder RN  Patient Tolerance (IS): good  Safety Promotion/Fall Prevention:   activity supervised   assistive device/personal items within reach   clutter free environment maintained   gait belt   nonskid shoes/slippers when out of bed   room organization consistent   safety round/check completed  Taken 10/27/2023 2200 by Bryce Holder RN  Patient Tolerance (IS): good  Safety Promotion/Fall Prevention:   activity supervised   assistive device/personal items within reach   clutter free environment maintained   gait belt   nonskid shoes/slippers when out of bed   room organization consistent   safety round/check completed  Administration (IS): self-administered  Incentive Spirometer Predicted Level (mL): 2000  Number of Repetitions (IS): 10  Taken 10/27/2023 1950 by Bryce Holder RN  Patient Tolerance (IS): good  Safety Promotion/Fall Prevention:   activity supervised   assistive device/personal items within reach   gait belt   nonskid shoes/slippers when out of bed   room organization consistent   safety round/check completed  Administration (IS): self-administered  Incentive Spirometer Predicted Level (mL): 2000     Problem: Pain (Spinal Surgery)  Goal: Acceptable Pain Control  Outcome: Ongoing, Progressing  Intervention: Prevent or Manage Pain  Recent Flowsheet Documentation  Taken 10/28/2023 0618 by Bryce Holder RN  Pain Management Interventions: see MAR  Taken 10/27/2023 2200 by Bryce Holder RN  Pain Management Interventions: see MAR  Taken 10/27/2023 1950 by Bryce Holder RN  Pain Management Interventions: no interventions per patient request  Diversional Activities:   smartphone   television     Problem: Postoperative Nausea and Vomiting (Spinal Surgery)  Goal: Nausea and Vomiting Relief  Outcome:  Ongoing, Progressing     Problem: Postoperative Urinary Retention (Spinal Surgery)  Goal: Effective Urinary Elimination  Outcome: Ongoing, Progressing     Problem: Respiratory Compromise (Spinal Surgery)  Goal: Effective Oxygenation and Ventilation  Outcome: Ongoing, Progressing  Intervention: Optimize Oxygenation and Ventilation  Recent Flowsheet Documentation  Taken 10/28/2023 0618 by Bryce Holder RN  Head of Bed (HOB) Positioning: HOB at 20-30 degrees  Taken 10/28/2023 0215 by Bryce Holder RN  Head of Bed (HOB) Positioning: HOB at 20-30 degrees  Taken 10/28/2023 0214 by Bryce Holder RN  Head of Bed (HOB) Positioning: HOB at 20-30 degrees  Taken 10/28/2023 0013 by Bryce Holder RN  Head of Bed (HOB) Positioning: HOB at 20-30 degrees  Taken 10/27/2023 2200 by Bryce Holder RN  Head of Bed (HOB) Positioning: HOB at 20-30 degrees  Taken 10/27/2023 1950 by Bryce Holder RN  Head of Bed (HOB) Positioning: HOB at 20-30 degrees   Goal Outcome Evaluation:

## 2023-10-28 NOTE — THERAPY TREATMENT NOTE
Patient Name: Samanta Mera  : 1960    MRN: [id number]                              Today's Date: 10/28/2023       Admit Date: 10/26/2023    Visit Dx:     ICD-10-CM ICD-9-CM   1. Synovial cyst of lumbar facet joint  M71.38 727.40     Patient Active Problem List   Diagnosis    Malignant neoplasm of right breast in female, estrogen receptor positive    Abnormal ECG    Absolute anemia    Skin cancer    Seasonal allergies    Elevated liver enzymes    Urgency of urination    Acute non-recurrent frontal sinusitis    CELESTINO (obstructive sleep apnea)    History of hysterectomy    Osteopenia of neck of left femur    Synovial cyst of lumbar facet joint     Past Medical History:   Diagnosis Date    Hx of radiation therapy     Low back pain     Lumbosacral disc disease     left side lower back    Malignant neoplasm of right breast in female, estrogen receptor positive 2018    radiation and surgery    Sleep apnea     cpap qhs     Past Surgical History:   Procedure Laterality Date    BREAST BIOPSY Right     BREAST LUMPECTOMY Right      SECTION      X 3    COLONOSCOPY  2019    HYSTERECTOMY      age 55, full    LUMBAR LAMINECTOMY WITH FUSION N/A 10/26/2023    Procedure: LUMBAR FUSION DECOMPRESSON WITH PEDICLE SCREWS L4-5;  Surgeon: Jose Farmer MD;  Location: [facility];  Service: Neurosurgery;  Laterality: N/A;    LUMBAR SYNOVIAL CYST REMOVAL  2023    facet cyst/steroid injection    OOPHORECTOMY      SKIN BIOPSY      basal cell or squamous    SKIN CANCER EXCISION      chest, face      General Information       Row Name 10/28/23 1020          Physical Therapy Time and Intention    Document Type therapy note (daily note)  -LO     Mode of Treatment individual therapy;physical therapy  -LO       Row Name 10/28/23 1020          General Information    Patient Profile Reviewed yes  -LO     Existing Precautions/Restrictions fall;spinal  hemovac  -       Row Name 10/28/23 1020          Cognition     Orientation Status (Cognition) oriented x 4  -LO       Row Name 10/28/23 1020          Safety Issues, Functional Mobility    Impairments Affecting Function (Mobility) pain;strength;balance  -LO               User Key  (r) = Recorded By, (t) = Taken By, (c) = Cosigned By      Initials Name Provider Type    Toshia Cote PT Physical Therapist                   Mobility       Row Name 10/28/23 1020          Bed Mobility    Bed Mobility sidelying-sit;sit-sidelying  -LO     Sidelying-Sit [place] (Bed Mobility) standby assist  -LO     Sit-Sidelying [place] (Bed Mobility) standby assist  -LO     Assistive Device (Bed Mobility) bed rails;head of bed elevated  -LO     Comment, (Bed Mobility) good logrolling technique, minimal cuing required  -LO       Row Name 10/28/23 1020          Transfers    Comment, (Transfers) EOB>stand>EOB; good sequencing no physical assist required  -LO       Row Name 10/28/23 1020          Sit-Stand Transfer    Sit-Stand [place] (Transfers) standby assist  -LO     Assistive Device (Sit-Stand Transfers) other (see comments)  none  -LO       Row Name 10/28/23 1020          Gait/Stairs (Locomotion)    [place] Level (Gait) standby assist  -LO     Assistive Device (Gait) other (see comments)  none  -LO     Distance in Feet (Gait) 300  -LO     Deviations/Abnormal Patterns (Gait) bilateral deviations;miguelangel decreased;gait speed decreased  -LO     Handrail Location (Stairs) left side (ascending);right side (descending)  -LO     Number of Steps (Stairs) 10  -LO     Ascending Technique (Stairs) step-to-step  -LO     Descending Technique (Stairs) step-to-step  -LO     Comment, (Gait/Stairs) Patient ambulates without AD with SBA with good step through contiuous pattern, step height, speed. Demonstrates reduced arm swing and pelvic rotation post surgery. No losses of balance noted. Demonstrates good, safe mechanics on steps without AD with use of railing.  -LO               User Key  (r)  = Recorded By, (t) = Taken By, (c) = Cosigned By      Initials Name Provider Type    Toshia Cote PT Physical Therapist                   Obj/Interventions       Row Name 10/28/23 1023          Motor Skills    Therapeutic Exercise other (see comments)  reviewed all HEP ( DF/PF, quad sets, glut sets, abd sets, knee falls outs, hip ER/IR, B arm flex x 15 each  -LO       Row Name 10/28/23 1023          Balance    Balance Assessment sitting static balance;sitting dynamic balance;standing static balance;standing dynamic balance  -LO     Static Sitting Balance independent  -LO     Dynamic Sitting Balance independent  -LO     Position, Sitting Balance unsupported;sitting edge of bed  -LO     Static Standing Balance standby assist  -LO     Dynamic Standing Balance standby assist  -LO     Position/Device Used, Standing Balance unsupported  -LO     Comment, Balance SBA without AD for safety in standing  -LO               User Key  (r) = Recorded By, (t) = Taken By, (c) = Cosigned By      Initials Name Provider Type    Toshia Cote PT Physical Therapist                   Goals/Plan    No documentation.                  Clinical Impression       Row Name 10/28/23 1024          Pain    Additional Documentation Pain Scale: FACES Pre/Post-Treatment (Group)  -LO       Row Name 10/28/23 1024          Pain Scale: FACES Pre/Post-Treatment    Pain: FACES Scale, Pretreatment 0-->no hurt  -LO     Posttreatment Pain Rating 2-->hurts little bit  -LO     Pain Location incisional  -LO     Pre/Posttreatment Pain Comment some discomfort with movement transitions  -LO       Row Name 10/28/23 1024          Plan of Care Review    Plan of Care Reviewed With patient  -LO     Progress improving  -LO     Outcome Evaluation Patient able to progress to ambulation and stair navigation without AD this date. Demonstrating independence with all HEP and following spinal precautions. Cont to recommend home with assist at dc when medically ready.  -LO        Row Name 10/28/23 1024          Therapy Assessment/Plan (PT)    Rehab Potential (PT) good, to achieve stated therapy goals  -LO     Criteria for Skilled Interventions Met (PT) yes;meets criteria  -LO     Therapy Frequency (PT) daily  -LO       Row Name 10/28/23 1024          Vital Signs    O2 Delivery Pre Treatment room air  -LO     O2 Delivery Intra Treatment room air  -LO     O2 Delivery Post Treatment room air  -LO     Pre Patient Position Standing  -LO     Intra Patient Position Standing  -LO     Post Patient Position Sitting  -LO       Row Name 10/28/23 1024          Positioning and Restraints    Pre-Treatment Position standing in room  -LO     Post Treatment Position bed  -LO     In Bed notified nsg;supine;call light within reach;encouraged to call for assist  -LO               User Key  (r) = Recorded By, (t) = Taken By, (c) = Cosigned By      Initials Name Provider Type    Toshia Cote, PT Physical Therapist                   Outcome Measures       Row Name 10/28/23 1028 10/28/23 0100       How much help from another person do you currently need...    Turning from your back to your side while in flat bed without using bedrails? 4  -LO 4  -TA    Moving from lying on back to sitting on the side of a flat bed without bedrails? 3  -LO 3  -TA    Moving to and from a bed to a chair (including a wheelchair)? 3  -LO 3  -TA    Standing up from a chair using your arms (e.g., wheelchair, bedside chair)? 4  -LO 3  -TA    Climbing 3-5 steps with a railing? 3  -LO 3  -TA    To walk in hospital room? 3  -LO 3  -TA    AM-PAC 6 Clicks Score (PT) 20  -LO 19  -TA    Highest level of mobility 6 --> Walked 10 steps or more  -LO 6 --> Walked 10 steps or more  -TA      Row Name 10/28/23 1028          Functional Assessment    Outcome Measure Options AM-PAC 6 Clicks Basic Mobility (PT)  -LO               User Key  (r) = Recorded By, (t) = Taken By, (c) = Cosigned By      Initials Name Provider Type    Toshia Cote, PT  Physical Therapist    Bryce Vazquez, RN Registered Nurse                                 Physical Therapy Education       Title: PT OT SLP Therapies (In Progress)       Topic: Physical Therapy (Done)       Point: Mobility training (Done)       Learning Progress Summary             Patient Acceptance, E, VU,NR by  at 10/28/2023 0818    Comment: PT POC    Acceptance, E, VU by CM at 10/27/2023 0957                         Point: Home exercise program (Done)       Learning Progress Summary             Patient Acceptance, E, VU,NR by LO at 10/28/2023 0818    Comment: PT POC    Acceptance, E, VU by CM at 10/27/2023 0957                         Point: Body mechanics (Done)       Learning Progress Summary             Patient Acceptance, E, VU,NR by LO at 10/28/2023 0818    Comment: PT POC    Acceptance, E, VU by CM at 10/27/2023 0957                         Point: Precautions (Done)       Learning Progress Summary             Patient Acceptance, E, VU,NR by LO at 10/28/2023 0818    Comment: PT POC    Acceptance, E, VU by CM at 10/27/2023 0957                                         User Key       Initials Effective Dates Name Provider Type Discipline     06/16/21 -  Toshia Dillard, PT Physical Therapist PT     09/22/22 -  Elsie Pastor, PT Physical Therapist PT                  PT Recommendation and Plan     Plan of Care Reviewed With: patient  Progress: improving  Outcome Evaluation: Patient able to progress to ambulation and stair navigation without AD this date. Demonstrating independence with all HEP and following spinal precautions. Cont to recommend home with assist at dc when medically ready.     Time Calculation:         PT Charges       Row Name 10/28/23 0818             Time Calculation    Start Time 0818  -LO      PT Received On 10/28/23  -      PT Goal Re-Cert Due Date 11/06/23  -         Timed Charges    58437 - PT Therapeutic Exercise Minutes 20  -      91921 - Gait Training Minutes  15   -LO      52229 - PT Therapeutic Activity Minutes 5  -LO         Total Minutes    Timed Charges Total Minutes 40  -LO       Total Minutes 40  -LO                User Key  (r) = Recorded By, (t) = Taken By, (c) = Cosigned By      Initials Name Provider Type    Toshia Cote, PT Physical Therapist                  Therapy Charges for Today       Code Description Service Date Service Provider Modifiers Qty    66559957549 HC PT THER PROC EA 15 MIN 10/28/2023 Toshia Dillard, PT GP 2    06387617598 HC GAIT TRAINING EA 15 MIN 10/28/2023 Toshia Dillard, PT GP 1            PT G-Codes  Outcome Measure Options: AM-PAC 6 Clicks Basic Mobility (PT)  AM-PAC 6 Clicks Score (PT): 20  AM-PAC 6 Clicks Score (OT): 20  PT Discharge Summary  Anticipated Discharge Disposition (PT): home with assist    Toshia Dillard PT  10/28/2023

## 2023-10-28 NOTE — PROGRESS NOTES
"NEUROSURGERY PROGRESS NOTE     LOS: 2 days   Patient Care Team:  Gertrude Suero PA-C as PCP - General (Physician Assistant)  Concepcion Otto MD as Referring Physician (General Surgery)  Saumya Call MD as Consulting Physician (Radiation Oncology)  Amy Santillan MD as Consulting Physician (Hematology and Oncology)  Kim Mishra APRN as Nurse Practitioner (Oncology)  Rachelle Sultana APRN as Nurse Practitioner (Nurse Practitioner)  Sergio Jama MD as Consulting Physician (Pain Medicine)  Josseline Howell RN as Ambulatory  ([facility])    Chief Complaint: Low back and left leg pain.    POD#: 2 Days Post-Op  Procedures:  L4-5 PLIF.    Interval History:   Patient Complaints: Modest incisional pain.  Patient Denies: Lower extremity pain.    Vital Signs: Blood pressure 113/57, pulse 59, temperature 97.8 °F (36.6 °C), temperature source Oral, resp. rate 18, height 167.6 cm (66\"), weight 67.1 kg (148 lb), SpO2 97%, not currently breastfeeding.  Intake/Output:   Intake/Output Summary (Last 24 hours) at 10/28/2023 0800  Last data filed at 10/28/2023 0434  Gross per 24 hour   Intake 720 ml   Output 170 ml   Net 550 ml     Drain output: 80/90 mL.    Physical Exam:  The patient is sitting up at bedside and eating breakfast.  She is in good spirits.     Data Review:    Results from last 7 days   Lab Units 10/27/23  0750   HEMOGLOBIN g/dL 9.7*   HEMATOCRIT % 30.8*         Assessment/Plan:  1.  L4-5 spondylolisthesis, stenosis, instability, synovial cyst status post decompression with fusion and stabilization.  2.  History of breast cancer.  3.  Disposition: Drain out mid day Home today.  Follow-up with XU in my office in approximately 3 weeks.      Jose Farmer MD  10/28/23  08:00 EDT    "

## 2023-10-28 NOTE — PLAN OF CARE
Goal Outcome Evaluation:  Plan of Care Reviewed With: patient        Progress: improving  Outcome Evaluation: Patient able to progress to ambulation and stair navigation without AD this date. Demonstrating independence with all HEP and following spinal precautions. Cont to recommend home with assist at dc when medically ready.      Anticipated Discharge Disposition (PT): home with assist

## 2023-10-30 ENCOUNTER — TELEPHONE (OUTPATIENT)
Dept: NEUROSURGERY | Facility: CLINIC | Age: 63
End: 2023-10-30
Payer: COMMERCIAL

## 2023-10-30 ENCOUNTER — TRANSITIONAL CARE MANAGEMENT TELEPHONE ENCOUNTER (OUTPATIENT)
Dept: CALL CENTER | Facility: HOSPITAL | Age: 63
End: 2023-10-30
Payer: COMMERCIAL

## 2023-10-30 DIAGNOSIS — M54.16 LUMBAR RADICULOPATHY: ICD-10-CM

## 2023-10-30 DIAGNOSIS — M43.16 SPONDYLOLISTHESIS OF LUMBAR REGION: Primary | ICD-10-CM

## 2023-10-30 NOTE — OUTREACH NOTE
Call Center TCM Note      Flowsheet Row Responses   [facility] patient discharged from? [place]   Does the patient have one of the following disease processes/diagnoses(primary or secondary)? General Surgery   TCM attempt successful? Yes   Call start time 1532   Call end time 1537   Discharge diagnosis LUMBAR FUSION DECOMPRESSON WITH PEDICLE SCREWS L4-5   Person spoke with today (if not patient) and relationship Patient   Meds reviewed with patient/caregiver? Yes   Is the patient having any side effects they believe may be caused by any medication additions or changes? Yes   Side effects comments  Patient reports constipation issues since surgery but is taking stool softener and laxative and has had results.   Does the patient have all medications related to this admission filled (includes all antibiotics, pain medications, etc.) Yes   Is the patient taking all medications as directed (includes completed medication regime)? Yes   Comments PCP Gertrude APODACA. Patient declines to schedule PCP HFU appt with call today. Patient reports that she will be following up with her surgeon and denies any needs from primary care at this time.   Does the patient have an appointment with their PCP within 7-14 days of discharge? No   Nursing Interventions Patient declined scheduling/rescheduling appointment at this time, Patient desires to follow up with specialty only   Has home health visited the patient within 72 hours of discharge? N/A   Psychosocial issues? No   Did the patient receive a copy of their discharge instructions? Yes   Nursing interventions Reviewed instructions with patient   What is the patient's perception of their health status since discharge? Improving   Nursing interventions Nurse provided patient education   Is the patient /caregiver able to teach back basic post-op care? Practice 'cough and deep breath', Drive as instructed by MD in discharge instructions, Take showers only when approved by  MD-sponge bathe until then, No tub bath, swimming, or hot tub until instructed by MD, Keep incision areas clean,dry and protected, Lifting as instructed by MD in discharge instructions   Is the patient/caregiver able to teach back signs and symptoms of incisional infection? Increased redness, swelling or pain at the incisonal site, Increased drainage or bleeding, Fever   Is the patient/caregiver able to teach back steps to recovery at home? Set small, achievable goals for return to baseline health, Rest and rebuild strength, gradually increase activity, Eat a well-balance diet   If the patient is a current smoker, are they able to teach back resources for cessation? Not a smoker   Is the patient/caregiver able to teach back the hierarchy of who to call/visit for symptoms/problems? PCP, Specialist, Home health nurse, Urgent Care, ED, 911 Yes   TCM call completed? Yes   Call end time 1537   Would this patient benefit from a Referral to [facility] Social Work? No   Is the patient interested in additional calls from an ambulatory ? No            Ymaini Young RN    10/30/2023, 15:39 EDT

## 2023-10-30 NOTE — DISCHARGE SUMMARY
[facility]    Date of Admission: 10/26/2023  Date of Discharge:  10/30/2023    Discharge Diagnosis: L4-5 spondylolisthesis, stenosis, instability synovial cyst status post decompression with fusion and stabilization.    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  Her pain was worse with sitting or weightbearing.  She felt better lying down.  Symptoms extended into an approximately L5 distribution of her left leg.  Conservative and pharmacological measures were not helpful.  Studies demonstrated a spondylolisthesis at L4-5 with evidence of instability.  There was stenosis and a large left synovial cyst.  As such, she underwent L4-5 decompression with fusion and stabilization by Dr. Farmer on 10/26/2023 without complications.    Hospital Course:   Patient was discharged 2 days postoperatively.  Vital signs remained stable.  Motor function remained intact in bilateral lower extremities.  She experienced modest incisional pain which was to be expected.  She worked alongside PT/OT.  Dry dressing made in place over the incision.  Drain was pulled on day 2 postop.  She denied any preoperative radicular symptoms.  She will follow-up in our office in approximately 3 weeks with plain films of the lumbar spine.    Condition on Discharge:  Stable  Discharge to: Home      Discharge Medications     Discharge Medications        New Medications        Instructions Start Date   acetaminophen 325 MG tablet  Commonly known as: TYLENOL   650 mg, Oral, 3 Times Daily      Banophen 25 MG capsule  Generic drug: diphenhydrAMINE   25 mg, Oral, Nightly PRN      oxyCODONE-acetaminophen 5-325 MG per tablet  Commonly known as: PERCOCET   Take 1 tablet by mouth 3 (Three) Times a  Day As Needed for Pain             Continue These Medications        Instructions Start Date   Acetaminophen-Pamabrom 500-25 MG tablet   1 tablet, Oral, Every 6 Hours      ibuprofen 200 MG tablet  Commonly known as: ADVIL,MOTRIN   600 mg, Oral, Every 6 Hours PRN      PROBIOTIC MULTI-ENZYME PO   3 tablets, Oral, Every Night at Bedtime               Follow-up Appointments  Future Appointments   Date Time Provider Department Center   12/19/2023  8:15 AM Rachelle Sultana APRN [facility]   1/15/2024  9:00 AM [facility] (SCREENING ROOM) [facility] Bernabe Cros     Additional Instructions for the Follow-ups that You Need to Schedule       Discharge Follow-up with Specified Provider: Darian; 3 Weeks   As directed      To: Darian   Follow Up: 3 Weeks   Follow Up Details: Follow-up with physician's assistant in 3 weeks with AP and lateral lumbar spine x-rays                Referring Provider  Jose Farmer MD    PCP   Gertrude Suero PA-C Chaz Clusky, PA-C  10/30/23  07:50 EDT

## 2023-11-14 ENCOUNTER — HOSPITAL ENCOUNTER (OUTPATIENT)
Dept: GENERAL RADIOLOGY | Facility: HOSPITAL | Age: 63
Discharge: HOME | End: 2023-11-14
Admitting: NEUROLOGICAL SURGERY
Payer: COMMERCIAL

## 2023-11-14 DIAGNOSIS — M43.16 SPONDYLOLISTHESIS OF LUMBAR REGION: ICD-10-CM

## 2023-11-14 DIAGNOSIS — M54.16 LUMBAR RADICULOPATHY: ICD-10-CM

## 2023-11-14 PROCEDURE — 72100 X-RAY EXAM L-S SPINE 2/3 VWS: CPT

## 2023-11-17 ENCOUNTER — OFFICE VISIT (OUTPATIENT)
Dept: NEUROSURGERY | Facility: CLINIC | Age: 63
End: 2023-11-17
Payer: COMMERCIAL

## 2023-11-17 VITALS
TEMPERATURE: 97.3 F | HEART RATE: 71 BPM | BODY MASS INDEX: 23.63 KG/M2 | HEIGHT: 66 IN | WEIGHT: 147 LBS | DIASTOLIC BLOOD PRESSURE: 84 MMHG | SYSTOLIC BLOOD PRESSURE: 130 MMHG | OXYGEN SATURATION: 98 %

## 2023-11-17 DIAGNOSIS — M71.30 SYNOVIAL CYST: ICD-10-CM

## 2023-11-17 DIAGNOSIS — M54.16 LUMBAR RADICULOPATHY: ICD-10-CM

## 2023-11-17 DIAGNOSIS — M43.16 SPONDYLOLISTHESIS OF LUMBAR REGION: Primary | ICD-10-CM

## 2023-11-17 PROCEDURE — 99024 POSTOP FOLLOW-UP VISIT: CPT

## 2023-11-17 NOTE — PROGRESS NOTES
Office Note     Name: Samanta Mera    : 1960     MRN: [id number]     PCP: Gertrude Suero PA-C    Chief Complaint  Left hip and leg pain with sensory alteration.    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  In  she began experiencing the above-noted complaints.  Her pain was worse with sitting or weightbearing.  She does better lying down.  Symptoms extended into an approximately L5 distribution of her left leg.  Conservative and pharmacological measures were not particularly helpful.  Study demonstrated a spondylolisthesis at L4-5 with evidence of instability.  There was stenosis and a large left synovial cyst.  As such, she underwent L4-5 decompression with fusion and stabilization by Dr. Farmer on 10/26/2023 without complications.    Ms. Mera returns for a follow-up today. Her left leg pain has improved compared to her preoperative symptoms. She continues to hold her own. She has seen small amounts of improvement over time. She still has a bit of low back discomfort and a dull ache at times. She still has a fair amount of difficulty when standing for a long time. She has began to walk more frequently.     Review of Systems:   Review of Systems    The patient's past medical history, past surgical history, family history, and social history have been reviewed at length in the electronic medical record.       Past Medical History:   Past Medical History:   Diagnosis Date    Hx of radiation therapy     Low back pain     Lumbosacral disc disease     left side lower back    Malignant neoplasm of right breast in female, estrogen receptor positive 2018    radiation and surgery    Sleep apnea     cpap qhs       Past Surgical History:   Past Surgical History:   Procedure Laterality Date    BREAST BIOPSY Right     BREAST LUMPECTOMY Right      SECTION      X 3    COLONOSCOPY  2019    HYSTERECTOMY      age 55, full     LUMBAR LAMINECTOMY WITH FUSION N/A 10/26/2023    Procedure: LUMBAR FUSION DECOMPRESSON WITH PEDICLE SCREWS L4-5;  Surgeon: Jose Farmer MD;  Location: [facility];  Service: Neurosurgery;  Laterality: N/A;    LUMBAR SYNOVIAL CYST REMOVAL  08/24/2023    facet cyst/steroid injection    OOPHORECTOMY      SKIN BIOPSY      basal cell or squamous    SKIN CANCER EXCISION      chest, face       Family History:   Family History   Problem Relation Age of Onset    Hypertension Mother     Cancer Mother         skin    Cancer Father         lung    Diabetes Daughter     Hypertension Sister     Hypertension Sister     Diabetes Sister         type 2    Diabetes Daughter         type 1    Breast cancer Neg Hx     Ovarian cancer Neg Hx        Social History:   Social History     Socioeconomic History    Marital status:    Tobacco Use    Smoking status: Never     Passive exposure: Past    Smokeless tobacco: Never   Vaping Use    Vaping Use: Never used   Substance and Sexual Activity    Alcohol use: Not Currently     Alcohol/week: 1.0 - 2.0 standard drink of alcohol     Types: 1 - 2 Glasses of wine per week     Comment: SOCIALLY    Drug use: No    Sexual activity: Defer     Partners: Male     Birth control/protection: Hysterectomy       Immunizations:   Immunization History   Administered Date(s) Administered    COVID-19 (PFIZER) Purple Cap Monovalent 12/23/2020, 01/13/2021, 10/13/2021    Flu Vaccine Quad PF >36MO 09/21/2022    Hepatitis A 07/02/2019    Shingrix 08/28/2019, 12/03/2019    Tdap 06/21/2023        Medications:     Current Outpatient Medications:     acetaminophen (TYLENOL) 325 MG tablet, Take 2 tablets by mouth 3 (Three) Times a Day., Disp: 120 tablet, Rfl: 0    diphenhydrAMINE (BENADRYL) 25 mg capsule, Take 1 capsule by mouth At Night As Needed for Sleep., Disp: 30 capsule, Rfl: 0    ibuprofen (ADVIL,MOTRIN) 200 MG tablet, Take 3 tablets by mouth Every 6 (Six) Hours As Needed for Mild Pain., Disp: , Rfl:      "Probiotic Product (PROBIOTIC MULTI-ENZYME PO), Take 3 tablets by mouth every night at bedtime., Disp: , Rfl:     Allergies:   No Known Allergies    Objective     Vital Signs  /84 (BP Location: Right arm, Patient Position: Sitting, Cuff Size: Adult)   Pulse 71   Temp 97.3 °F (36.3 °C) (Temporal)   Ht 167.6 cm (66\")   Wt 66.7 kg (147 lb)   SpO2 98%   BMI 23.73 kg/m²   Estimated body mass index is 23.73 kg/m² as calculated from the following:    Height as of this encounter: 167.6 cm (66\").    Weight as of this encounter: 66.7 kg (147 lb).    Physical Exam   Constitutional: Patient is well-developed and appears stated age. Pleasant and   cooperative. Appears in no acute distress.   Incision: Lumbar incision looks good    Tobacco Use: Low Risk  (11/17/2023)    Patient History     Smoking Tobacco Use: Never     Smokeless Tobacco Use: Never     Passive Exposure: Past        Body mass index is 23.73 kg/m².    Fall Risk Assessment was completed, and patient is at low risk for falls.      Assessment and Plan     Medical Decision Making     Data Review:   (All imaging independently reviewed unless stated otherwise.)   Plain films dated 11/14/2023 demonstrate postoperative changes at L4-5.  Hardware alignment is maintained and reveals good construct.    Diagnosis:  1.  L4-5 spondylolisthesis, stenosis, instability, synovial cyst status post decompression with fusion and stabilization.    Treatment Options:   Ms. Mera is here for follow-up. Preoperative symptoms are slowly improving. She is seeing progress day by day. She has increased her activity.  I suspect that her discomfort in the low back and when walking will improve over time.  We discussed general do's and don'ts today. She will remain off work until follow-up. She will follow-up with Dr. Farmer in 6 weeks to check on her progress.         Diagnosis Plan   1. Spondylolisthesis of lumbar region        2. Lumbar radiculopathy        3. Synovial cyst      "           Stephen Garcia PA-C  [facility]  [address] [address] [phone]

## 2023-12-19 ENCOUNTER — OFFICE VISIT (OUTPATIENT)
Dept: SLEEP MEDICINE | Facility: CLINIC | Age: 63
End: 2023-12-19
Payer: COMMERCIAL

## 2023-12-19 VITALS
OXYGEN SATURATION: 94 % | HEART RATE: 73 BPM | SYSTOLIC BLOOD PRESSURE: 122 MMHG | BODY MASS INDEX: 24.03 KG/M2 | WEIGHT: 149.5 LBS | DIASTOLIC BLOOD PRESSURE: 76 MMHG | TEMPERATURE: 98 F | HEIGHT: 66 IN

## 2023-12-19 DIAGNOSIS — G47.33 OSA (OBSTRUCTIVE SLEEP APNEA): Primary | ICD-10-CM

## 2023-12-19 NOTE — PROGRESS NOTES
Chief Complaint:   Chief Complaint   Patient presents with    Sleep Apnea    Follow-up       HPI:    Samanta Mera is a 63 y.o. female here for follow-up of apnea.  Patient was last seen 8/21/2023.  Patient was having difficulty with abdominal distention and belching.  We did decrease pressure to 6-13.  We did also encourage her to use a nasal strap which she has not yet tried.  Patient states abdominal distention is better but still occurs sparingly.  Patient states she would still like pressures decreased somewhat we will decrease the 6-11 I think we will still get a normal AHI and make her more comfortable in doing so.  She currently gets 4 to 6 hours of sleep nightly.  She recently had back pain and does toss and turn with pain at times.  Patient has an Berry score of 3/24.  She is having no other concerns or complaints and wishes to continue therapy.        Current medications are:   Current Outpatient Medications:     acetaminophen (TYLENOL) 325 MG tablet, Take 2 tablets by mouth 3 (Three) Times a Day., Disp: 120 tablet, Rfl: 0    ibuprofen (ADVIL,MOTRIN) 200 MG tablet, Take 3 tablets by mouth Every 6 (Six) Hours As Needed for Mild Pain., Disp: , Rfl:     Probiotic Product (PROBIOTIC MULTI-ENZYME PO), Take 3 tablets by mouth every night at bedtime., Disp: , Rfl: .      The patient's relevant past medical, surgical, family and social history were reviewed and updated in Epic as appropriate.       Review of Systems   Respiratory:  Positive for apnea.    Gastrointestinal:  Positive for abdominal distention.   Musculoskeletal:  Positive for back pain.   Allergic/Immunologic: Positive for environmental allergies.   Psychiatric/Behavioral:  Positive for sleep disturbance.    All other systems reviewed and are negative.        Objective:    Physical Exam  Constitutional:       Appearance: Normal appearance.   HENT:      Head: Normocephalic and atraumatic.      Mouth/Throat:      Comments: Class 1  airway  Cardiovascular:      Rate and Rhythm: Normal rate and regular rhythm.   Pulmonary:      Effort: Pulmonary effort is normal.      Breath sounds: Normal breath sounds.   Skin:     General: Skin is warm and dry.   Neurological:      Mental Status: She is alert and oriented to person, place, and time.   Psychiatric:         Mood and Affect: Mood normal.         Behavior: Behavior normal.         Thought Content: Thought content normal.         Judgment: Judgment normal.         CPAP Report  79/90 days of use  Greater than 4-hour use 76%  AHI 0.5  Settings 6-13    The patient continues to use and benefit from CPAP therapy.    ASSESSMENT/PLAN    Diagnoses and all orders for this visit:    1. CELESTINO (obstructive sleep apnea) (Primary)  -     PAP Therapy        Counseled patient regarding multimodal approach with healthy nutrition, healthy sleep, regular physical activity, social activities, counseling, and medications. Encouraged to practice lateral sleep position. Avoid alcohol and sedatives close to bedtime.  Lower settings 6-11 she will continue CPAP I will see her back in 1 year or sooner if symptoms warrant.      I have reviewed the results of my evaluation and impression and discussed my recommendations in detail with the patient.      Signed by  Rachelle Sultana, BRYAN    December 19, 2023      CC: Gertrude Suero PA-C         No ref. provider found

## 2023-12-20 ENCOUNTER — OFFICE VISIT (OUTPATIENT)
Dept: NEUROSURGERY | Facility: CLINIC | Age: 63
End: 2023-12-20
Payer: COMMERCIAL

## 2023-12-20 VITALS — BODY MASS INDEX: 23.98 KG/M2 | WEIGHT: 149.2 LBS | HEIGHT: 66 IN | TEMPERATURE: 97.7 F

## 2023-12-20 DIAGNOSIS — M43.16 SPONDYLOLISTHESIS OF LUMBAR REGION: ICD-10-CM

## 2023-12-20 DIAGNOSIS — M71.30 SYNOVIAL CYST: ICD-10-CM

## 2023-12-20 DIAGNOSIS — M54.16 LUMBAR RADICULOPATHY: Primary | ICD-10-CM

## 2023-12-20 PROCEDURE — 99024 POSTOP FOLLOW-UP VISIT: CPT | Performed by: NEUROLOGICAL SURGERY

## 2023-12-20 NOTE — PROGRESS NOTES
Patient: Samanta Mera  : 1960    Primary Care Provider: Gertrude Suero PA-C    Requesting Provider: As above        History    Chief Complaint: Left hip and leg pain with sensory alteration.    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  She has had significant hip and leg pain that was unremitting.  Studies demonstrated an L4-5 spondylolisthesis with a large left synovial cyst.  As such, on 10/26/2023 she underwent L4-5 decompression with fusion and stabilization.  Her leg pain is largely absent.  She is quite sore in her back.  This is particular case when she is up and about at home for extended periods of time.    Review of Systems   Constitutional:  Negative for activity change, appetite change, chills, diaphoresis, fatigue, fever and unexpected weight change.   HENT:  Negative for congestion, dental problem, drooling, ear discharge, ear pain, facial swelling, hearing loss, mouth sores, nosebleeds, postnasal drip, rhinorrhea, sinus pressure, sinus pain, sneezing, sore throat, tinnitus, trouble swallowing and voice change.    Eyes:  Negative for photophobia, pain, discharge, redness, itching and visual disturbance.   Respiratory:  Negative for apnea, cough, choking, chest tightness, shortness of breath, wheezing and stridor.    Cardiovascular:  Negative for chest pain, palpitations and leg swelling.   Gastrointestinal:  Negative for abdominal distention, abdominal pain, anal bleeding, blood in stool, constipation, diarrhea, nausea, rectal pain and vomiting.   Endocrine: Negative for cold intolerance, heat intolerance, polydipsia, polyphagia and polyuria.   Genitourinary:  Negative for decreased urine volume, difficulty urinating, dyspareunia, dysuria, enuresis, flank pain, frequency, genital sores, hematuria, menstrual problem, pelvic pain, urgency, vaginal bleeding, vaginal discharge and vaginal pain.   Musculoskeletal:  Negative for arthralgias,  "back pain, gait problem, joint swelling, myalgias, neck pain and neck stiffness.   Skin:  Negative for color change, pallor, rash and wound.   Allergic/Immunologic: Negative for environmental allergies, food allergies and immunocompromised state.   Neurological:  Negative for dizziness, tremors, seizures, syncope, facial asymmetry, speech difficulty, weakness, light-headedness, numbness and headaches.   Hematological:  Negative for adenopathy. Does not bruise/bleed easily.   Psychiatric/Behavioral:  Negative for agitation, behavioral problems, confusion, decreased concentration, dysphoric mood, hallucinations, self-injury, sleep disturbance and suicidal ideas. The patient is not nervous/anxious and is not hyperactive.        The patient's past medical history, past surgical history, family history, and social history have been reviewed at length in the electronic medical record.      Physical Exam:   Temp 97.7 °F (36.5 °C) (Infrared)   Ht 167.6 cm (66\")   Wt 67.7 kg (149 lb 3.2 oz)   BMI 24.08 kg/m²   Lumbar incision looks great.    Medical Decision Making    Data Review:   (All imaging studies were personally reviewed unless stated otherwise)  Laying films of the lumbar spine dated 11/14/2023 demonstrate good position of her construct at L4-5 where her listhesis has been reduced.    Diagnosis:   L4-5 spondylolisthesis, stenosis, instability, synovial cyst status post decompression with fusion and stabilization.    Treatment Options:   Ms. Mera is doing great.  Her ongoing low-grade back pain is to be expected.  That will diminish over time.  She is to be off work until follow-up with us in several weeks to check on her progress.      Scribed for Jose Farmer MD by Jose Farmer MD on 12/20/2023 16:19 EST      I, Dr. Farmer, personally performed the services described in the documentation, as scribed in my presence, and it is both accurate and complete.  "

## 2024-01-19 ENCOUNTER — OFFICE VISIT (OUTPATIENT)
Dept: NEUROSURGERY | Facility: CLINIC | Age: 64
End: 2024-01-19
Payer: COMMERCIAL

## 2024-01-19 VITALS — BODY MASS INDEX: 24.11 KG/M2 | WEIGHT: 150 LBS | TEMPERATURE: 96.8 F | HEIGHT: 66 IN

## 2024-01-19 DIAGNOSIS — Z98.1 S/P LUMBAR FUSION: ICD-10-CM

## 2024-01-19 DIAGNOSIS — M43.10 ACQUIRED SPONDYLOLISTHESIS: ICD-10-CM

## 2024-01-19 DIAGNOSIS — M71.38 SYNOVIAL CYST OF LUMBAR FACET JOINT: Primary | ICD-10-CM

## 2024-01-19 NOTE — PROGRESS NOTES
Subjective     Chief Complaint: Left hip and leg pain with sensory alteration.S/p lumbar fusion for spondylolisthesis and synovial cyst    Patient ID: Samanta Mera is a 63 y.o. female is here today for follow-up.    Back Pain  Pertinent negatives include no abdominal pain, chest pain, dysuria, fever, headaches, numbness, pelvic pain or weakness.        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  She presented with significant hip and leg pain that was unremitting.  Studies demonstrated an L4-5 spondylolisthesis with a large left synovial cyst.  As such, on 10/26/2023 she underwent L4-5 decompression with fusion and stabilization.  Her leg pain is largely absent.  She still has some back pain from time to time with prolonged sitting or standing.  She had been off work, but has made the decision to retire and this is effective today. She has no concerns at this time and does not feel that she needs PT or any medication.     The following portions of the patient's history were reviewed and updated as appropriate: allergies, current medications, past family history, past medical history, past social history, past surgical history and problem list.    Family history:   Family History   Problem Relation Age of Onset    Hypertension Mother     Cancer Mother         skin    Cancer Father         lung    Diabetes Daughter     Hypertension Sister     Hypertension Sister     Diabetes Sister         type 2    Diabetes Daughter         type 1    Breast cancer Neg Hx     Ovarian cancer Neg Hx        Social history:   Social History     Socioeconomic History    Marital status:    Tobacco Use    Smoking status: Never     Passive exposure: Past    Smokeless tobacco: Never   Vaping Use    Vaping Use: Never used   Substance and Sexual Activity    Alcohol use: Yes     Alcohol/week: 1.0 - 2.0 standard drink of alcohol     Types: 1 - 2 Glasses of wine per week     Comment: SOCIALLY     Drug use: No    Sexual activity: Defer     Partners: Male     Birth control/protection: Hysterectomy       Review of Systems   Constitutional:  Negative for activity change, appetite change, chills, diaphoresis, fatigue, fever and unexpected weight change.   HENT:  Negative for congestion, dental problem, drooling, ear discharge, ear pain, facial swelling, hearing loss, mouth sores, nosebleeds, postnasal drip, rhinorrhea, sinus pressure, sinus pain, sneezing, sore throat, tinnitus, trouble swallowing and voice change.    Eyes:  Negative for photophobia, pain, discharge, redness, itching and visual disturbance.   Respiratory:  Negative for apnea, cough, choking, chest tightness, shortness of breath, wheezing and stridor.    Cardiovascular:  Negative for chest pain, palpitations and leg swelling.   Gastrointestinal:  Negative for abdominal distention, abdominal pain, anal bleeding, blood in stool, constipation, diarrhea, nausea, rectal pain and vomiting.   Endocrine: Negative for cold intolerance, heat intolerance, polydipsia, polyphagia and polyuria.   Genitourinary:  Negative for decreased urine volume, difficulty urinating, dyspareunia, dysuria, enuresis, flank pain, frequency, genital sores, hematuria, menstrual problem, pelvic pain, urgency, vaginal bleeding, vaginal discharge and vaginal pain.   Musculoskeletal:  Positive for back pain. Negative for arthralgias, gait problem, joint swelling, myalgias, neck pain and neck stiffness.   Skin:  Negative for color change, pallor, rash and wound.   Allergic/Immunologic: Negative for environmental allergies, food allergies and immunocompromised state.   Neurological:  Negative for dizziness, tremors, seizures, syncope, facial asymmetry, speech difficulty, weakness, light-headedness, numbness and headaches.   Hematological:  Negative for adenopathy. Does not bruise/bleed easily.   Psychiatric/Behavioral:  Negative for agitation, behavioral problems, confusion,  "decreased concentration, dysphoric mood, hallucinations, self-injury, sleep disturbance and suicidal ideas. The patient is not nervous/anxious and is not hyperactive.        Objective   Temperature 96.8 °F (36 °C), temperature source Infrared, height 167.6 cm (65.98\"), weight 68 kg (150 lb), not currently breastfeeding.  Body mass index is 24.22 kg/m².    Physical Exam  Physical Exam  Constitutional:       Appearance: Normal appearance.   Eyes:      Pupils: Pupils are equal, round, and reactive to light.   Cardiovascular:      Pulses: Normal pulses.   ____Pulmonary:      Effort: Pulmonary effort is normal.      Breath sounds: Normal breath sounds.   Musculoskeletal:      Comments: Gait steady and independent with no foot drop.    Skin:     Comments: Incision is well healed  _Neurological:      General: No focal deficit present.      Mental Status:   alert and oriented to person, place, and time.   Psychiatric:         Mood and Affect: Mood normal.         Behavior: Behavior normal.    Assessment & Plan   Ms Mera is doing very well. She is pleased with her progress and the relief of her leg pain. She has decided to retire as she has trouble being on her feet for 12hrs+ at a time and feels that she is ready for this step. She has no real concerns today. We will follow up with her on an as-needed basis going forward.     Diagnoses and all orders for this visit:    1. Synovial cyst of lumbar facet joint (Primary)    2. S/P lumbar fusion    3. Acquired spondylolisthesis        Return if symptoms worsen or fail to improve.        This document signed by Xiao Calloway PA-C  January 19, 2024 10:05 EST      "

## 2024-02-15 ENCOUNTER — PATIENT MESSAGE (OUTPATIENT)
Dept: FAMILY MEDICINE CLINIC | Facility: CLINIC | Age: 64
End: 2024-02-15
Payer: COMMERCIAL

## 2024-02-15 DIAGNOSIS — M79.672 LEFT FOOT PAIN: Primary | ICD-10-CM

## 2024-02-27 ENCOUNTER — HOSPITAL ENCOUNTER (OUTPATIENT)
Dept: MAMMOGRAPHY | Facility: HOSPITAL | Age: 64
Discharge: HOME | End: 2024-02-27
Admitting: PHYSICIAN ASSISTANT
Payer: COMMERCIAL

## 2024-02-27 DIAGNOSIS — Z12.31 ENCOUNTER FOR SCREENING MAMMOGRAM FOR MALIGNANT NEOPLASM OF BREAST: ICD-10-CM

## 2024-02-27 PROCEDURE — 77063 BREAST TOMOSYNTHESIS BI: CPT | Performed by: RADIOLOGY

## 2024-02-27 PROCEDURE — 77063 BREAST TOMOSYNTHESIS BI: CPT

## 2024-02-27 PROCEDURE — 77067 SCR MAMMO BI INCL CAD: CPT

## 2024-02-27 PROCEDURE — 77067 SCR MAMMO BI INCL CAD: CPT | Performed by: RADIOLOGY

## 2024-11-18 ENCOUNTER — OFFICE VISIT (OUTPATIENT)
Dept: FAMILY MEDICINE CLINIC | Facility: CLINIC | Age: 64
End: 2024-11-18
Payer: COMMERCIAL

## 2024-11-18 VITALS
BODY MASS INDEX: 23.56 KG/M2 | WEIGHT: 146.6 LBS | SYSTOLIC BLOOD PRESSURE: 134 MMHG | HEIGHT: 66 IN | HEART RATE: 81 BPM | DIASTOLIC BLOOD PRESSURE: 82 MMHG | OXYGEN SATURATION: 97 %

## 2024-11-18 DIAGNOSIS — R09.81 HEAD CONGESTION: ICD-10-CM

## 2024-11-18 DIAGNOSIS — J01.10 ACUTE NON-RECURRENT FRONTAL SINUSITIS: ICD-10-CM

## 2024-11-18 DIAGNOSIS — J02.9 SORE THROAT: Primary | ICD-10-CM

## 2024-11-18 PROBLEM — D05.11 INTRADUCTAL CARCINOMA IN SITU OF RIGHT BREAST: Status: ACTIVE | Noted: 2018-11-05

## 2024-11-18 LAB
EXPIRATION DATE: NORMAL
FLUAV AG UPPER RESP QL IA.RAPID: NOT DETECTED
FLUBV AG UPPER RESP QL IA.RAPID: NOT DETECTED
INTERNAL CONTROL: NORMAL
Lab: NORMAL
SARS-COV-2 AG UPPER RESP QL IA.RAPID: NOT DETECTED

## 2024-11-18 PROCEDURE — 99213 OFFICE O/P EST LOW 20 MIN: CPT | Performed by: PHYSICIAN ASSISTANT

## 2024-11-18 PROCEDURE — 87428 SARSCOV & INF VIR A&B AG IA: CPT | Performed by: PHYSICIAN ASSISTANT

## 2024-11-18 RX ORDER — AZITHROMYCIN 250 MG/1
TABLET, FILM COATED ORAL
Qty: 6 TABLET | Refills: 0 | Status: SHIPPED | OUTPATIENT
Start: 2024-11-18

## 2024-11-18 NOTE — PROGRESS NOTES
Chief Complaint   Patient presents with    Headache    Nasal Congestion         Samanta Mera is a 64 y.o. female who presents for Headache and Nasal Congestion that started last Thursday.  Thought she was getting better, felt worse when she woke up today.  No fever, chills, cough.  Sore throat at onset - still red, no pain.  Facial pain and pressure.  No antibiotic allergies.      Past Medical History:   Diagnosis Date    Drug therapy     Hx of radiation therapy     Low back pain     Lumbosacral disc disease     left side lower back    Malignant neoplasm of right breast in female, estrogen receptor positive 2018    radiation and surgery    Sleep apnea     cpap qhs       Past Surgical History:   Procedure Laterality Date    BREAST BIOPSY Right     BREAST LUMPECTOMY Right      SECTION      X 3    COLONOSCOPY  2019    HYSTERECTOMY      age 55, full    LUMBAR LAMINECTOMY WITH FUSION N/A 10/26/2023    Procedure: LUMBAR FUSION DECOMPRESSON WITH PEDICLE SCREWS L4-5;  Surgeon: Jose Farmer MD;  Location: [facility];  Service: Neurosurgery;  Laterality: N/A;    LUMBAR SYNOVIAL CYST REMOVAL  2023    facet cyst/steroid injection    OOPHORECTOMY      SKIN BIOPSY      basal cell or squamous    SKIN CANCER EXCISION      chest, face       Family History   Problem Relation Age of Onset    Hypertension Mother     Cancer Mother         skin    Cancer Father         lung    Diabetes Daughter         type 1    Hypertension Sister     Hypertension Sister     Diabetes Sister         type 2    Diabetes Daughter         type 1    Breast cancer Neg Hx     Ovarian cancer Neg Hx        Social History     Socioeconomic History    Marital status:    Tobacco Use    Smoking status: Never     Passive exposure: Past    Smokeless tobacco: Never   Vaping Use    Vaping status: Never Used   Substance and Sexual Activity    Alcohol use: Yes     Alcohol/week: 1.0 - 2.0 standard drink of alcohol     Types: 1 - 2  "Glasses of wine per week     Comment: SOCIALLY    Drug use: No    Sexual activity: Defer     Partners: Male     Birth control/protection: Hysterectomy       No Known Allergies    ROS    Review of Systems   Constitutional:  Positive for fatigue. Negative for chills and fever.   HENT:  Positive for congestion, rhinorrhea, sinus pressure and sore throat. Negative for ear pain.    Respiratory:  Negative for cough, shortness of breath and wheezing.    Neurological:  Positive for headache. Negative for dizziness.       Vitals:    11/18/24 1253   BP: 134/82   BP Location: Left arm   Patient Position: Sitting   Cuff Size: Adult   Pulse: 81   SpO2: 97%   Weight: 66.5 kg (146 lb 9.6 oz)   Height: 167.6 cm (65.98\")     Body mass index is 23.67 kg/m².    Current Outpatient Medications on File Prior to Visit   Medication Sig Dispense Refill    acetaminophen (TYLENOL) 325 MG tablet Take 2 tablets by mouth 3 (Three) Times a Day. 120 tablet 0    ibuprofen (ADVIL,MOTRIN) 200 MG tablet Take 3 tablets by mouth Every 6 (Six) Hours As Needed for Mild Pain.      nystatin (MYCOSTATIN) 526045 UNIT/GM cream Apply 1 Application topically to the appropriate area as directed Daily. 30 g 1    Probiotic Product (PROBIOTIC MULTI-ENZYME PO) Take 3 tablets by mouth every night at bedtime.      triamcinolone (KENALOG) 0.1 % ointment Apply 1 Application topically to the appropriate area as directed 2 (Two) Times a Day. 80 g 0    [DISCONTINUED] meloxicam (MOBIC) 7.5 MG tablet Take 1 tablet by mouth Daily As Needed. Take with food. 30 tablet 1    [DISCONTINUED] meloxicam (MOBIC) 7.5 MG tablet Take 1 tablet by mouth 2 (Two) Times a Day with food 60 tablet 0     No current facility-administered medications on file prior to visit.       Results for orders placed or performed during the hospital encounter of 10/26/23   Tissue Pathology Exam    Collection Time: 10/26/23  7:58 AM    Specimen: Spine, Lumbar; Tissue   Result Value Ref Range    Case Report      " " Surgical Pathology Report                         Case: [id number]                                  Authorizing Provider:  Jose Farmer MD       Collected:           10/26/2023 07:58 AM          Ordering Location:     [facility]   Received:            10/26/2023 11:14 AM                                 OR                                                                           Pathologist:           Sridevi Starr DO                                                        Specimen:    Spine, Lumbar, SYNOVIAL CYST FOR PERMANENT                                                 Clinical Information       Synovial cyst of lumbar facet joint      Final Diagnosis       SYNOVIAL CYST, BIOPSY:  Synovial cyst with reactive vascular proliferation  Associated bone and cartilage present  No evidence of malignancy identified      Gross Description       1. Spine, Lumbar.  Received in formalin labeled \"synovial cyst\" is a 1.5 x 1.2 x 0.4 cm aggregate of tan-pink soft tissue, filtered and submitted entirely in block 1A. HDM        Microscopic Description       The slides are reviewed and demonstrate histopathologic features supporting the above rendered diagnosis.       Hemoglobin & Hematocrit, Blood    Collection Time: 10/27/23  7:50 AM    Specimen: Blood   Result Value Ref Range    Hemoglobin 9.7 (L) 12.0 - 15.9 g/dL    Hematocrit 30.8 (L) 34.0 - 46.6 %       PE    Physical Exam  Vitals reviewed.   Constitutional:       General: She is not in acute distress.     Appearance: She is well-developed. She is ill-appearing. She is not diaphoretic.   HENT:      Head: Normocephalic and atraumatic.      Right Ear: Hearing, tympanic membrane, ear canal and external ear normal.      Left Ear: Hearing, tympanic membrane, ear canal and external ear normal.      Nose: Nose normal.      Right Sinus: Maxillary sinus tenderness present. No frontal sinus tenderness.      Left Sinus: Maxillary sinus tenderness present. No " frontal sinus tenderness.      Mouth/Throat:      Pharynx: Uvula midline. Posterior oropharyngeal erythema present.   Eyes:      Conjunctiva/sclera: Conjunctivae normal.   Cardiovascular:      Rate and Rhythm: Normal rate and regular rhythm.      Heart sounds: Normal heart sounds. No murmur heard.     No friction rub. No gallop.   Pulmonary:      Effort: Pulmonary effort is normal. No respiratory distress.      Breath sounds: Normal breath sounds.   Musculoskeletal:         General: Normal range of motion.      Cervical back: Normal range of motion.   Skin:     General: Skin is warm.      Findings: No erythema.   Neurological:      Mental Status: She is alert and oriented to person, place, and time.   Psychiatric:         Behavior: Behavior normal.         Thought Content: Thought content normal.         Judgment: Judgment normal.          A/P    Diagnoses and all orders for this visit:    1. Sore throat (Primary)  -     POCT SARS-CoV-2 Antigen KEISHA + Flu    2. Head congestion  -     POCT SARS-CoV-2 Antigen KEISHA + Flu    3. Acute non-recurrent frontal sinusitis  -     azithromycin (Zithromax Z-Buzz) 250 MG tablet; Take 2 tablets by mouth on Day 1; THEN take 1 tablet daily on Days 2-5  Dispense: 6 tablet; Refill: 0    New.  Started 5 days ago.  Not improving.  Facial pain/headache.  Flu and covid negative.  Will treat for sinusitis with z-pack.    Plan of care reviewed with patient at the conclusion of today's visit. Education was provided regarding diagnosis, management and any prescribed or recommended OTC medications.  Patient verbalizes understanding of and agreement with management plan.    Dictated Utilizing Dragon Dictation     Please note that portions of this note were completed with a voice recognition program.     Part of this note may be an electronic transcription/translation of spoken language to printed text using the Dragon Dictation System.    Return in about 6 weeks (around 1/2/2025).     Gertrude BAUER  XU Suero    Answers submitted by the patient for this visit:  Other (Submitted on 11/18/2024)  Please describe your symptoms.: congestion with headache  Have you had these symptoms before?: No  How long have you been having these symptoms?: 1-4 days  Please list any medications you are currently taking for this condition.: advil,sudafed  Primary Reason for Visit (Submitted on 11/18/2024)  What is the primary reason for your visit?: Problem Not Listed

## 2025-01-08 ENCOUNTER — OFFICE VISIT (OUTPATIENT)
Dept: FAMILY MEDICINE CLINIC | Facility: CLINIC | Age: 65
End: 2025-01-08
Payer: COMMERCIAL

## 2025-01-08 ENCOUNTER — LAB (OUTPATIENT)
Dept: LAB | Facility: HOSPITAL | Age: 65
End: 2025-01-08
Payer: COMMERCIAL

## 2025-01-08 VITALS
BODY MASS INDEX: 22.82 KG/M2 | HEART RATE: 76 BPM | HEIGHT: 66 IN | OXYGEN SATURATION: 98 % | WEIGHT: 142 LBS | SYSTOLIC BLOOD PRESSURE: 120 MMHG | DIASTOLIC BLOOD PRESSURE: 82 MMHG

## 2025-01-08 DIAGNOSIS — G89.29 CHRONIC MIDLINE LOW BACK PAIN, UNSPECIFIED WHETHER SCIATICA PRESENT: ICD-10-CM

## 2025-01-08 DIAGNOSIS — Z90.710 HISTORY OF HYSTERECTOMY: ICD-10-CM

## 2025-01-08 DIAGNOSIS — N95.2 VAGINAL ATROPHY: ICD-10-CM

## 2025-01-08 DIAGNOSIS — M85.852 OSTEOPENIA OF NECK OF LEFT FEMUR: ICD-10-CM

## 2025-01-08 DIAGNOSIS — Z00.00 PHYSICAL EXAM, ANNUAL: Primary | ICD-10-CM

## 2025-01-08 DIAGNOSIS — Z00.00 PHYSICAL EXAM, ANNUAL: ICD-10-CM

## 2025-01-08 DIAGNOSIS — C50.911 MALIGNANT NEOPLASM OF RIGHT BREAST IN FEMALE, ESTROGEN RECEPTOR POSITIVE, UNSPECIFIED SITE OF BREAST: ICD-10-CM

## 2025-01-08 DIAGNOSIS — M54.50 CHRONIC MIDLINE LOW BACK PAIN, UNSPECIFIED WHETHER SCIATICA PRESENT: ICD-10-CM

## 2025-01-08 DIAGNOSIS — Z17.0 MALIGNANT NEOPLASM OF RIGHT BREAST IN FEMALE, ESTROGEN RECEPTOR POSITIVE, UNSPECIFIED SITE OF BREAST: ICD-10-CM

## 2025-01-08 LAB
ALBUMIN SERPL-MCNC: 4 G/DL (ref 3.5–5.2)
ALBUMIN/GLOB SERPL: 1.1 G/DL
ALP SERPL-CCNC: 60 U/L (ref 39–117)
ALT SERPL W P-5'-P-CCNC: 14 U/L (ref 1–33)
ANION GAP SERPL CALCULATED.3IONS-SCNC: 11.4 MMOL/L (ref 5–15)
AST SERPL-CCNC: 24 U/L (ref 1–32)
BASOPHILS # BLD AUTO: 0.05 10*3/MM3 (ref 0–0.2)
BASOPHILS NFR BLD AUTO: 0.8 % (ref 0–1.5)
BILIRUB SERPL-MCNC: 0.5 MG/DL (ref 0–1.2)
BUN SERPL-MCNC: 16 MG/DL (ref 8–23)
BUN/CREAT SERPL: 15.1 (ref 7–25)
CALCIUM SPEC-SCNC: 10 MG/DL (ref 8.6–10.5)
CHLORIDE SERPL-SCNC: 102 MMOL/L (ref 98–107)
CHOLEST SERPL-MCNC: 284 MG/DL (ref 0–200)
CO2 SERPL-SCNC: 25.6 MMOL/L (ref 22–29)
CREAT SERPL-MCNC: 1.06 MG/DL (ref 0.57–1)
DEPRECATED RDW RBC AUTO: 40.9 FL (ref 37–54)
EGFRCR SERPLBLD CKD-EPI 2021: 58.8 ML/MIN/1.73
EOSINOPHIL # BLD AUTO: 0.1 10*3/MM3 (ref 0–0.4)
EOSINOPHIL NFR BLD AUTO: 1.6 % (ref 0.3–6.2)
ERYTHROCYTE [DISTWIDTH] IN BLOOD BY AUTOMATED COUNT: 12.1 % (ref 12.3–15.4)
GLOBULIN UR ELPH-MCNC: 3.8 GM/DL
GLUCOSE SERPL-MCNC: 98 MG/DL (ref 65–99)
HBA1C MFR BLD: 5.5 % (ref 4.8–5.6)
HCT VFR BLD AUTO: 43.2 % (ref 34–46.6)
HDLC SERPL-MCNC: 98 MG/DL (ref 40–60)
HGB BLD-MCNC: 13.6 G/DL (ref 12–15.9)
IMM GRANULOCYTES # BLD AUTO: 0.01 10*3/MM3 (ref 0–0.05)
IMM GRANULOCYTES NFR BLD AUTO: 0.2 % (ref 0–0.5)
LDLC SERPL CALC-MCNC: 171 MG/DL (ref 0–100)
LDLC/HDLC SERPL: 1.71 {RATIO}
LYMPHOCYTES # BLD AUTO: 2.43 10*3/MM3 (ref 0.7–3.1)
LYMPHOCYTES NFR BLD AUTO: 38.9 % (ref 19.6–45.3)
MCH RBC QN AUTO: 28.9 PG (ref 26.6–33)
MCHC RBC AUTO-ENTMCNC: 31.5 G/DL (ref 31.5–35.7)
MCV RBC AUTO: 91.7 FL (ref 79–97)
MONOCYTES # BLD AUTO: 0.5 10*3/MM3 (ref 0.1–0.9)
MONOCYTES NFR BLD AUTO: 8 % (ref 5–12)
NEUTROPHILS NFR BLD AUTO: 3.16 10*3/MM3 (ref 1.7–7)
NEUTROPHILS NFR BLD AUTO: 50.5 % (ref 42.7–76)
NRBC BLD AUTO-RTO: 0 /100 WBC (ref 0–0.2)
PLATELET # BLD AUTO: 247 10*3/MM3 (ref 140–450)
PMV BLD AUTO: 12.1 FL (ref 6–12)
POTASSIUM SERPL-SCNC: 4.5 MMOL/L (ref 3.5–5.2)
PROT SERPL-MCNC: 7.8 G/DL (ref 6–8.5)
RBC # BLD AUTO: 4.71 10*6/MM3 (ref 3.77–5.28)
SODIUM SERPL-SCNC: 139 MMOL/L (ref 136–145)
TRIGL SERPL-MCNC: 92 MG/DL (ref 0–150)
TSH SERPL DL<=0.05 MIU/L-ACNC: 2.47 UIU/ML (ref 0.27–4.2)
VLDLC SERPL-MCNC: 15 MG/DL (ref 5–40)
WBC NRBC COR # BLD AUTO: 6.25 10*3/MM3 (ref 3.4–10.8)

## 2025-01-08 PROCEDURE — 80061 LIPID PANEL: CPT

## 2025-01-08 PROCEDURE — 83036 HEMOGLOBIN GLYCOSYLATED A1C: CPT

## 2025-01-08 PROCEDURE — 80050 GENERAL HEALTH PANEL: CPT

## 2025-01-08 PROCEDURE — 99396 PREV VISIT EST AGE 40-64: CPT | Performed by: PHYSICIAN ASSISTANT

## 2025-01-08 NOTE — PROGRESS NOTES
Chief Complaint   Patient presents with    Back Pain     6 week follow up    Annual Exam       Samanta Mera is a pleasant 64 y.o. female who is here for annual physical exam.  Patient is doing well overall.  She is still adjusting to long-term.  She retired last January.  She still has some back pain - worse when going from lying to sitting with stiffness.  Stretching regularly.  Established with oncology for history of estrogen positive breast cancer.  Reports vaginal dryness and pain with intercourse.  History of hysterectomy.  Mammogram is up-to-date.  Colonoscopy is up-to-date.  Reviewed vaccinations.    Past Medical History:   Diagnosis Date    Drug therapy     Hx of radiation therapy     Low back pain     Lumbosacral disc disease     left side lower back    Malignant neoplasm of right breast in female, estrogen receptor positive 2018    radiation and surgery    Sleep apnea     cpap qhs       Past Surgical History:   Procedure Laterality Date    BREAST BIOPSY Right     BREAST LUMPECTOMY Right      SECTION      X 3    COLONOSCOPY  2019    HYSTERECTOMY      age 55, full    LUMBAR LAMINECTOMY WITH FUSION N/A 10/26/2023    Procedure: LUMBAR FUSION DECOMPRESSON WITH PEDICLE SCREWS L4-5;  Surgeon: Jose Farmer MD;  Location: [facility];  Service: Neurosurgery;  Laterality: N/A;    LUMBAR SYNOVIAL CYST REMOVAL  2023    facet cyst/steroid injection    OOPHORECTOMY      SKIN BIOPSY      basal cell or squamous    SKIN CANCER EXCISION      chest, face       Family History   Problem Relation Age of Onset    Hypertension Mother     Cancer Mother         skin    Cancer Father         lung    Diabetes Daughter         type 1    Hypertension Sister     Hypertension Sister     Diabetes Sister         type 2    Diabetes Daughter         type 1    Breast cancer Neg Hx     Ovarian cancer Neg Hx        Social History     Socioeconomic History    Marital status:    Tobacco Use    Smoking  "status: Never     Passive exposure: Past    Smokeless tobacco: Never   Vaping Use    Vaping status: Never Used   Substance and Sexual Activity    Alcohol use: Yes     Alcohol/week: 1.0 - 2.0 standard drink of alcohol     Types: 1 - 2 Glasses of wine per week     Comment: SOCIALLY    Drug use: No    Sexual activity: Defer     Partners: Male     Birth control/protection: Hysterectomy       No Known Allergies    ROS  Review of Systems   Constitutional:  Negative for chills, diaphoresis, fatigue and fever.   HENT:  Negative for congestion, ear pain, hearing loss, postnasal drip, rhinorrhea and sore throat.    Eyes:  Negative for blurred vision and pain.   Respiratory:  Negative for cough, shortness of breath and wheezing.    Cardiovascular:  Negative for chest pain and leg swelling.   Gastrointestinal:  Negative for abdominal pain, blood in stool, constipation, diarrhea, nausea, vomiting and indigestion.   Endocrine: Negative for polyuria.   Genitourinary:  Positive for dyspareunia and vaginal pain. Negative for dysuria, flank pain, hematuria, vaginal bleeding and vaginal discharge.   Musculoskeletal:  Negative for arthralgias, gait problem and myalgias.   Skin:  Negative for rash and skin lesions.   Neurological:  Negative for dizziness and headache.   Psychiatric/Behavioral:  Negative for self-injury, sleep disturbance, suicidal ideas, depressed mood and stress. The patient is not nervous/anxious.        Vitals:    01/08/25 1135   BP: 120/82   Pulse: 76   SpO2: 98%   Weight: 64.4 kg (142 lb)   Height: 167.6 cm (65.98\")     Body mass index is 22.93 kg/m².    BMI is within normal parameters. No other follow-up for BMI required.       Current Outpatient Medications on File Prior to Visit   Medication Sig Dispense Refill    Probiotic Product (PROBIOTIC MULTI-ENZYME PO) Take 3 tablets by mouth every night at bedtime.      [DISCONTINUED] acetaminophen (TYLENOL) 325 MG tablet Take 2 tablets by mouth 3 (Three) Times a Day. " 120 tablet 0    [DISCONTINUED] azithromycin (Zithromax Z-Buzz) 250 MG tablet Take 2 tablets by mouth on Day 1; THEN take 1 tablet daily on Days 2-5 6 tablet 0    [DISCONTINUED] ibuprofen (ADVIL,MOTRIN) 200 MG tablet Take 3 tablets by mouth Every 6 (Six) Hours As Needed for Mild Pain.      [DISCONTINUED] nystatin (MYCOSTATIN) 484533 UNIT/GM cream Apply 1 Application topically to the appropriate area as directed Daily. 30 g 1    [DISCONTINUED] triamcinolone (KENALOG) 0.1 % ointment Apply 1 Application topically to the appropriate area as directed 2 (Two) Times a Day. 80 g 0     No current facility-administered medications on file prior to visit.       Results for orders placed or performed in visit on 11/18/24   POCT SARS-CoV-2 Antigen KEISHA + Flu    Collection Time: 11/18/24  1:38 PM    Specimen: Swab   Result Value Ref Range    SARS Antigen Not Detected Not Detected, Presumptive Negative    Influenza A Antigen KEISHA Not Detected Not Detected    Influenza B Antigen KEISHA Not Detected Not Detected    Internal Control Passed Passed    Lot Number [id number]     Expiration Date 9,042,025        PE    Physical Exam  Vitals reviewed.   Constitutional:       General: She is not in acute distress.     Appearance: Normal appearance. She is well-developed and normal weight. She is not ill-appearing or diaphoretic.   HENT:      Head: Normocephalic and atraumatic.      Right Ear: Hearing, tympanic membrane, ear canal and external ear normal.      Left Ear: Hearing, tympanic membrane, ear canal and external ear normal.      Nose: Nose normal.      Right Sinus: No maxillary sinus tenderness or frontal sinus tenderness.      Left Sinus: No maxillary sinus tenderness or frontal sinus tenderness.      Mouth/Throat:      Pharynx: Uvula midline.   Eyes:      General: Lids are normal.      Extraocular Movements: Extraocular movements intact.      Conjunctiva/sclera: Conjunctivae normal.   Neck:      Thyroid: No thyroid mass or thyromegaly.       Trachea: Trachea and phonation normal.   Cardiovascular:      Rate and Rhythm: Normal rate and regular rhythm.      Heart sounds: Normal heart sounds.   Pulmonary:      Effort: Pulmonary effort is normal.      Breath sounds: Normal breath sounds.   Abdominal:      General: Bowel sounds are normal. There is no distension.      Palpations: Abdomen is soft. Abdomen is not rigid.      Tenderness: There is no abdominal tenderness. There is no guarding.   Musculoskeletal:         General: Normal range of motion.      Cervical back: Normal range of motion.      Right lower leg: No edema.      Left lower leg: No edema.   Lymphadenopathy:      Cervical: No cervical adenopathy.      Right cervical: No superficial cervical adenopathy.     Left cervical: No superficial cervical adenopathy.   Skin:     General: Skin is warm.      Findings: No erythema or rash.      Nails: There is no clubbing.   Neurological:      Mental Status: She is alert and oriented to person, place, and time.      Coordination: Coordination normal.      Gait: Gait normal.      Deep Tendon Reflexes: Reflexes are normal and symmetric.      Comments: CN grossly intact   Psychiatric:         Attention and Perception: Attention and perception normal. She is attentive.         Mood and Affect: Mood and affect normal.         Speech: Speech normal.         Behavior: Behavior normal. Behavior is cooperative.         Thought Content: Thought content normal.         Cognition and Memory: Cognition and memory normal.         Judgment: Judgment normal.         A/P    Diagnoses and all orders for this visit:    1. Physical exam, annual (Primary)  -     CBC Auto Differential; Future  -     Comprehensive Metabolic Panel; Future  -     TSH Rfx On Abnormal To Free T4; Future  -     Lipid Panel; Future  -     Hemoglobin A1c; Future  PE completed  Preventative labs ordered  Colonoscopy is up-to-date  Mammogram is up-to-date  Gynecologist - hysterectomy  Dentist - encouraged  to go regularly  Ophthalmologist - encouraged to go regularly  Dermatologist - encouraged to go regularly  Vaccinations discussed    2. Vaginal atrophy  3. History of hysterectomy  4. Malignant neoplasm of right breast in female, estrogen receptor positive, unspecified site of breast  Followed by oncology.  Unable to use vaginal topical cream.  Will send in prescriptions to [facility] to see if these help.  Discussed referral to  gynecological urology.  She will call if she wants a referral.    5. Osteopenia of neck of left femur  Encouraged vitamin D, calcium and exercise.    6. Chronic midline low back pain, unspecified whether sciatica present  Had lumbar fusion.  Retired from nursing.  Morning stiffness, worse when changing positions.  Encouraged exercise like yoga, pilates, swimming.  Managing conservatively with tylenol/ibuprofen.  Will call if symptoms worsen or change.       Plan of care reviewed with patient at the conclusion of today's visit. Education was provided regarding nutrition , exercise, supplements, preventative screenings, and vaccinations diagnosis, management and any prescribed or recommended OTC medications.  Patient verbalizes understanding of and agreement with management plan.    Dictated Utilizing Dragon Dictation     Please note that portions of this note were completed with a voice recognition program.     Part of this note may be an electronic transcription/translation of spoken language to printed text using the Dragon Dictation System.    Return in about 6 months (around 7/8/2025) for welcome to medicare.     Gertrude Suero PA-C  Answers submitted by the patient for this visit:  Primary Reason for Visit (Submitted on 1/7/2025)  What is the primary reason for your visit?: Problem Not Listed  Problem not listed (Submitted on 1/7/2025)  Chief Complaint: Other medical problem  Reason for appointment: check up

## 2025-01-13 ENCOUNTER — TRANSCRIBE ORDERS (OUTPATIENT)
Dept: ADMINISTRATIVE | Facility: HOSPITAL | Age: 65
End: 2025-01-13
Payer: COMMERCIAL

## 2025-01-13 DIAGNOSIS — Z12.31 VISIT FOR SCREENING MAMMOGRAM: Primary | ICD-10-CM

## 2025-02-28 ENCOUNTER — HOSPITAL ENCOUNTER (OUTPATIENT)
Dept: MAMMOGRAPHY | Facility: HOSPITAL | Age: 65
Discharge: HOME | End: 2025-02-28
Admitting: PHYSICIAN ASSISTANT
Payer: MEDICARE

## 2025-02-28 DIAGNOSIS — Z12.31 VISIT FOR SCREENING MAMMOGRAM: ICD-10-CM

## 2025-02-28 PROCEDURE — 77067 SCR MAMMO BI INCL CAD: CPT

## 2025-02-28 PROCEDURE — 77063 BREAST TOMOSYNTHESIS BI: CPT

## 2025-04-21 ENCOUNTER — OFFICE VISIT (OUTPATIENT)
Dept: FAMILY MEDICINE CLINIC | Facility: CLINIC | Age: 65
End: 2025-04-21
Payer: MEDICARE

## 2025-04-21 VITALS
HEIGHT: 66 IN | DIASTOLIC BLOOD PRESSURE: 80 MMHG | HEART RATE: 73 BPM | SYSTOLIC BLOOD PRESSURE: 136 MMHG | OXYGEN SATURATION: 97 % | WEIGHT: 147.4 LBS | BODY MASS INDEX: 23.69 KG/M2

## 2025-04-21 DIAGNOSIS — K52.9 GASTROENTERITIS: Primary | ICD-10-CM

## 2025-04-21 RX ORDER — DICYCLOMINE HCL 20 MG
20 TABLET ORAL
Qty: 30 TABLET | Refills: 0 | Status: SHIPPED | OUTPATIENT
Start: 2025-04-21 | End: 2025-05-01

## 2025-04-21 RX ORDER — ONDANSETRON 8 MG/1
8 TABLET, ORALLY DISINTEGRATING ORAL EVERY 8 HOURS PRN
Qty: 9 TABLET | Refills: 2 | Status: SHIPPED | OUTPATIENT
Start: 2025-04-21

## 2025-04-21 NOTE — PATIENT INSTRUCTIONS
If your symptoms don't improve in the next 48 hours, let me know and we will get stool studies and/or imaging  Pedialyte and symptomatic medications

## 2025-04-22 ENCOUNTER — TELEPHONE (OUTPATIENT)
Dept: FAMILY MEDICINE CLINIC | Facility: CLINIC | Age: 65
End: 2025-04-22
Payer: COMMERCIAL

## 2025-04-22 NOTE — TELEPHONE ENCOUNTER
Spoke with patient.  Eating BRAT diet.  Still having some bloody mucusal diarrhea - has slowed down.  No fever, chills or abdominal pain.  She will call tomorrow if symptoms persist - would order stool studies and CT abdo/pelvis.

## 2025-04-23 DIAGNOSIS — R19.7 BLOODY DIARRHEA: Primary | ICD-10-CM

## 2025-04-23 DIAGNOSIS — R10.32 LEFT LOWER QUADRANT ABDOMINAL PAIN: ICD-10-CM

## 2025-04-24 ENCOUNTER — HOSPITAL ENCOUNTER (OUTPATIENT)
Dept: CT IMAGING | Facility: HOSPITAL | Age: 65
Discharge: HOME | End: 2025-04-24
Admitting: PHYSICIAN ASSISTANT
Payer: MEDICARE

## 2025-04-24 DIAGNOSIS — R19.7 BLOODY DIARRHEA: ICD-10-CM

## 2025-04-24 DIAGNOSIS — R10.32 LEFT LOWER QUADRANT ABDOMINAL PAIN: ICD-10-CM

## 2025-04-24 PROCEDURE — 25510000001 IOPAMIDOL PER 1 ML: Performed by: PHYSICIAN ASSISTANT

## 2025-04-24 PROCEDURE — 74178 CT ABD&PLV WO CNTR FLWD CNTR: CPT

## 2025-04-24 RX ORDER — IOPAMIDOL 755 MG/ML
89 INJECTION, SOLUTION INTRAVASCULAR
Status: COMPLETED | OUTPATIENT
Start: 2025-04-24 | End: 2025-04-24

## 2025-04-24 RX ADMIN — IOPAMIDOL 89 ML: 755 INJECTION, SOLUTION INTRAVENOUS at 16:39

## 2025-04-25 ENCOUNTER — RESULTS FOLLOW-UP (OUTPATIENT)
Dept: FAMILY MEDICINE CLINIC | Facility: CLINIC | Age: 65
End: 2025-04-25
Payer: COMMERCIAL

## 2025-04-25 DIAGNOSIS — K52.9 COLITIS: Primary | ICD-10-CM

## 2025-04-25 RX ORDER — CIPROFLOXACIN 500 MG/1
500 TABLET, FILM COATED ORAL 2 TIMES DAILY
Qty: 20 TABLET | Refills: 0 | Status: SHIPPED | OUTPATIENT
Start: 2025-04-25 | End: 2025-05-05

## 2025-04-25 RX ORDER — METRONIDAZOLE 500 MG/1
500 TABLET ORAL 3 TIMES DAILY
Qty: 30 TABLET | Refills: 0 | Status: SHIPPED | OUTPATIENT
Start: 2025-04-25 | End: 2025-05-05

## 2025-04-25 NOTE — TELEPHONE ENCOUNTER
No answer, left message.  Calling about her CT abdomen and pelvis.  Will call her back later today.

## 2025-04-25 NOTE — TELEPHONE ENCOUNTER
Spoke with patient regarding CT abdo/pelvis.  States her bloody diarrhea and discomfort has improved...still not feeling back to normal.  Will send in antibiotics.  She can start these if symptoms persist.

## 2025-05-14 NOTE — PROGRESS NOTES
Established Patient Office Visit      Patient Name: Samanta Mera  : 1960   MRN: [id number]   Care Team: Patient Care Team:  Gertrude Suero PA-C as PCP - General (Physician Assistant)  Concepcion Otto MD as Referring Physician (General Surgery)  Saumya Call MD as Consulting Physician (Radiation Oncology)  Amy Santillan MD as Consulting Physician (Hematology and Oncology)  Kim Mishra APRN as Nurse Practitioner (Oncology)  Rachelle Sultana APRN as Nurse Practitioner (Nurse Practitioner)  Sergio Jama MD as Consulting Physician (Pain Medicine)    Chief Complaint:    Chief Complaint   Patient presents with    GI Problem     Began last night after dinner. Stomach cramping and lose stool    Hemorrhoids     A lot of blood with them       History of Present Illness: Samanta Mera is a 65 y.o. female who is here today for chief complaint.    HPI    She began having stomach cramping and loose stool after dinner last night with some blood    This patient is accompanied by their  who contributes to the history of their care.    The following portions of the patient's history were reviewed and updated as appropriate: allergies, current medications, past family history, past medical history, past social history, past surgical history and problem list.    Subjective      Review of Systems:   Review of Systems - See HPI    Past Medical History:   Past Medical History:   Diagnosis Date    Drug therapy     Extremity pain 2023    left leg numbness due to back    Hx of radiation therapy     Low back pain     Lumbosacral disc disease     left side lower back    Malignant neoplasm of right breast in female, estrogen receptor positive 2018    radiation and surgery    Sleep apnea     cpap qhs    Urinary tract infection        Past Surgical History:   Past Surgical History:   Procedure Laterality Date    BREAST BIOPSY Right     BREAST LUMPECTOMY Right      SECTION    "   X 3    COLONOSCOPY  03/2019    HYSTERECTOMY      age 55, full    LUMBAR LAMINECTOMY WITH FUSION N/A 10/26/2023    Procedure: LUMBAR FUSION DECOMPRESSON WITH PEDICLE SCREWS L4-5;  Surgeon: Jose Farmer MD;  Location: [facility];  Service: Neurosurgery;  Laterality: N/A;    LUMBAR SYNOVIAL CYST REMOVAL  08/24/2023    facet cyst/steroid injection    OOPHORECTOMY      SKIN BIOPSY      basal cell or squamous    SKIN CANCER EXCISION      chest, face       Family History:   Family History   Problem Relation Age of Onset    Hypertension Mother     Cancer Mother         skin    Cancer Father         lung    Diabetes Daughter         type 1    Hypertension Sister     Hypertension Sister     Diabetes Sister         type 2    Diabetes Daughter         type 1    Breast cancer Neg Hx     Ovarian cancer Neg Hx        Social History:   Social History     Socioeconomic History    Marital status:    Tobacco Use    Smoking status: Never     Passive exposure: Past    Smokeless tobacco: Never   Vaping Use    Vaping status: Never Used   Substance and Sexual Activity    Alcohol use: Yes     Alcohol/week: 1.0 - 2.0 standard drink of alcohol     Types: 1 - 2 Glasses of wine per week     Comment: SOCIALLY    Drug use: No    Sexual activity: Defer     Partners: Male     Birth control/protection: Hysterectomy       Tobacco History:   Social History     Tobacco Use   Smoking Status Never    Passive exposure: Past   Smokeless Tobacco Never       Medications:   Outpatient Medications Prior to Visit   Medication Sig Dispense Refill    Probiotic Product (PROBIOTIC MULTI-ENZYME PO) Take 3 tablets by mouth every night at bedtime.       No facility-administered medications prior to visit.        Allergies:   No Known Allergies    Objective   Objective     Physical Exam:  Vital Signs:   Vitals:    04/21/25 0951   BP: 136/80   Pulse: 73   SpO2: 97%   Weight: 66.9 kg (147 lb 6.4 oz)   Height: 167.6 cm (65.98\")     Body mass index is 23.8 " kg/m².     Physical Exam  Nursing note reviewed  Const: NAD, A&Ox4, Pleasant, Cooperative  Eyes: EOMI, no conjunctivitis  ENT: No nasal discharge present, neck supple  Cardiac: Regular rate and rhythm, no cyanosis  Resp: Respiratory rate within normal limits, no increased work of breathing, no audible wheezing or retractions noted  GI: No distention or ascites  MSK: Motor and sensation grossly intact in bilateral upper extremities  Neurologic: CN II-XII grossly intact  Psych: Appropriate mood and behavior.  Skin: Warm, dry  Procedures/Radiology     Procedures  No radiology results for the last 7 days     Assessment & Plan   Assessment / Plan      Assessment/Plan:   Problems Addressed This Visit  Diagnoses and all orders for this visit:    1. Gastroenteritis (Primary)  -     dicyclomine (BENTYL) 20 MG tablet; Take 1 tablet by mouth 3 (Three) Times a Day Before Meals for 10 days. Take 30 minutes prior to eating to help with bowel urgency/diarrhea  Dispense: 30 tablet; Refill: 0  -     ondansetron ODT (ZOFRAN-ODT) 8 MG disintegrating tablet; Place 1 tablet on the tongue Every 8 (Eight) Hours As Needed for Nausea or Vomiting.  Dispense: 9 tablet; Refill: 2      Problem List Items Addressed This Visit    None  Visit Diagnoses         Gastroenteritis    -  Primary    Relevant Medications    ondansetron ODT (ZOFRAN-ODT) 8 MG disintegrating tablet            New Medications Ordered This Visit   Medications    dicyclomine (BENTYL) 20 MG tablet     Sig: Take 1 tablet by mouth 3 (Three) Times a Day Before Meals for 10 days. Take 30 minutes prior to eating to help with bowel urgency/diarrhea     Dispense:  30 tablet     Refill:  0    ondansetron ODT (ZOFRAN-ODT) 8 MG disintegrating tablet     Sig: Place 1 tablet on the tongue Every 8 (Eight) Hours As Needed for Nausea or Vomiting.     Dispense:  9 tablet     Refill:  2        Patient Instructions   If your symptoms don't improve in the next 48 hours, let me know and we will get  stool studies and/or imaging  Pedialyte and symptomatic medications    Follow Up:   Return if symptoms worsen or fail to improve.        DO HOANG Price RD  [facility]  [address]  [facility] [address]  Fax [phone]  Phone [phone]    Disclaimer to patients: The 21[employer] Century C[employer] Act makes medical notes like these available to patients in the interest of transparency. However, please be advised that this is still a medical document. It is intended as [id number] communication. Many sections may include medical language or jargon, abbreviations, and additional verbiage that are unfamiliar or confusing. In some ways it may come across as blunt, direct, or may be summarized in order to clearly and concisely communicate the most crucial information to medical professionals. It may also include mentions of conditions that are unlikely but considered as part of the differential diagnosis, including serious disorders. These are not always discussed at length at the time of appointment because their likelihood is so low, but may be included in a medical note to make it clear what has been considered and/or ruled out as part of a work-up. Medical documents are intended to carry relevant information, facts as evident, and the personal clinical opinion of the physician. If you have any questions regarding this medical document, please bring them to the attention of the physician at your next scheduled appointment.

## 2025-08-19 ENCOUNTER — TELEPHONE (OUTPATIENT)
Dept: FAMILY MEDICINE CLINIC | Facility: CLINIC | Age: 65
End: 2025-08-19
Payer: COMMERCIAL

## (undated) DEVICE — SUT SILK 2/0 PS 18IN 1588H

## (undated) DEVICE — PCH INST SURG INVISISHIELD 2PCKT

## (undated) DEVICE — KT DRN EVAC WND PVC PCH WTROC RND 10F400

## (undated) DEVICE — SPHR MARKR STEALTH STATION

## (undated) DEVICE — DRP CASSET 10 RAY LG 24X40

## (undated) DEVICE — STRAP POSTN KN/BDY FM 5X72IN DISP

## (undated) DEVICE — GLV SURG PREMIERPRO ORTHO LTX PF SZ8 BRN

## (undated) DEVICE — PATIENT RETURN ELECTRODE, SINGLE-USE, CONTACT QUALITY MONITORING, ADULT, WITH 9FT CORD, FOR PATIENTS WEIGING OVER 33LBS. (15KG): Brand: MEGADYNE

## (undated) DEVICE — PK NEURO DISC 10

## (undated) DEVICE — STRIP,CLOSURE,WOUND,MEDI-STRIP,1/2X4: Brand: MEDLINE

## (undated) DEVICE — BLANKT WARM UPPR/BDY ARM/OUT 57X196CM

## (undated) DEVICE — DRAPE,TOP,102X53,STERILE: Brand: MEDLINE

## (undated) DEVICE — TRAP,MUCUS SPECIMEN,40CC: Brand: MEDLINE

## (undated) DEVICE — ANTIBACTERIAL UNDYED BRAIDED (POLYGLACTIN 910), SYNTHETIC ABSORBABLE SUTURE: Brand: COATED VICRYL

## (undated) DEVICE — SHEET,DRAPE,40X58,STERILE: Brand: MEDLINE

## (undated) DEVICE — TOOL MR8-14MH30 MR8 14CM MATCH 3MM: Brand: MIDAS REX MR8

## (undated) DEVICE — ADHS LIQ MASTISOL 2/3ML

## (undated) DEVICE — PACK,UNIVERSAL,NO GOWNS: Brand: MEDLINE

## (undated) DEVICE — SNAP KOVER: Brand: UNBRANDED

## (undated) DEVICE — DRSNG WND GZ PAD BORDERED 4X8IN STRL

## (undated) DEVICE — IRRIGATOR BULB ASEPTO 60CC STRL

## (undated) DEVICE — GLV SURG PREMIERPRO MIC LTX PF SZ7.5 BRN

## (undated) DEVICE — SUT VIC 0 CTD BR 18IN UNDYE VCP724D

## (undated) DEVICE — JACKSON TABLE POSITIONER KIT: Brand: MEDLINE INDUSTRIES, INC.

## (undated) DEVICE — SUT VIC PLS CTD ANTIB BR 3/0 8/18IN 45CM

## (undated) DEVICE — GOWN,REINFORCE,POLY,SIRUS,BREATH SLV,XLG: Brand: MEDLINE